# Patient Record
Sex: MALE | Race: WHITE | NOT HISPANIC OR LATINO | Employment: OTHER | URBAN - METROPOLITAN AREA
[De-identification: names, ages, dates, MRNs, and addresses within clinical notes are randomized per-mention and may not be internally consistent; named-entity substitution may affect disease eponyms.]

---

## 2017-03-15 RX ORDER — SPIRONOLACTONE 25 MG/1
TABLET ORAL
Qty: 90 TABLET | Refills: 11 | Status: SHIPPED | OUTPATIENT
Start: 2017-03-15 | End: 2017-05-29

## 2017-04-12 RX ORDER — OMEPRAZOLE 40 MG/1
40 CAPSULE, DELAYED RELEASE ORAL DAILY
Qty: 30 CAPSULE | Refills: 11 | Status: SHIPPED | OUTPATIENT
Start: 2017-04-12 | End: 2017-05-29 | Stop reason: SDUPTHER

## 2017-05-18 DIAGNOSIS — N39.41 URGENCY INCONTINENCE: ICD-10-CM

## 2017-05-18 DIAGNOSIS — N32.81 OAB (OVERACTIVE BLADDER): ICD-10-CM

## 2017-05-18 RX ORDER — OXYBUTYNIN CHLORIDE 10 MG/1
TABLET, EXTENDED RELEASE ORAL
Qty: 90 TABLET | Refills: 3 | Status: SHIPPED | OUTPATIENT
Start: 2017-05-18 | End: 2017-05-29

## 2017-05-20 ENCOUNTER — HOSPITAL ENCOUNTER (INPATIENT)
Facility: HOSPITAL | Age: 77
LOS: 1 days | Discharge: HOME HEALTH | DRG: 066 | End: 2017-05-22
Attending: EMERGENCY MEDICINE | Admitting: NURSE PRACTITIONER
Payer: MEDICARE

## 2017-05-20 DIAGNOSIS — I48.0 PAF (PAROXYSMAL ATRIAL FIBRILLATION): ICD-10-CM

## 2017-05-20 DIAGNOSIS — I35.9 NONRHEUMATIC AORTIC VALVE DISORDER: ICD-10-CM

## 2017-05-20 DIAGNOSIS — R47.1 DYSARTHRIA: ICD-10-CM

## 2017-05-20 DIAGNOSIS — E03.4 HYPOTHYROIDISM DUE TO ACQUIRED ATROPHY OF THYROID: ICD-10-CM

## 2017-05-20 DIAGNOSIS — R13.10 DYSPHAGIA, UNSPECIFIED TYPE: ICD-10-CM

## 2017-05-20 DIAGNOSIS — R13.10 DYSPHAGIA: ICD-10-CM

## 2017-05-20 DIAGNOSIS — R29.810 FACIAL DROOP: ICD-10-CM

## 2017-05-20 DIAGNOSIS — G45.9 TRANSIENT CEREBRAL ISCHEMIA, UNSPECIFIED TYPE: Primary | ICD-10-CM

## 2017-05-20 DIAGNOSIS — I63.449 CEREBRAL INFARCTION DUE TO EMBOLISM OF UNSPECIFIED CEREBELLAR ARTERY: ICD-10-CM

## 2017-05-20 LAB
ALBUMIN SERPL BCP-MCNC: 4.1 G/DL
ALP SERPL-CCNC: 37 U/L
ALT SERPL W/O P-5'-P-CCNC: 17 U/L
ANION GAP SERPL CALC-SCNC: 11 MMOL/L
AST SERPL-CCNC: 20 U/L
BASOPHILS # BLD AUTO: 0.02 K/UL
BASOPHILS NFR BLD: 0.1 %
BILIRUB SERPL-MCNC: 0.8 MG/DL
BUN SERPL-MCNC: 21 MG/DL
CALCIUM SERPL-MCNC: 10.2 MG/DL
CHLORIDE SERPL-SCNC: 104 MMOL/L
CHOLEST/HDLC SERPL: 2.9 {RATIO}
CO2 SERPL-SCNC: 24 MMOL/L
CREAT SERPL-MCNC: 0.8 MG/DL
DIFFERENTIAL METHOD: ABNORMAL
EOSINOPHIL # BLD AUTO: 0.1 K/UL
EOSINOPHIL NFR BLD: 0.4 %
ERYTHROCYTE [DISTWIDTH] IN BLOOD BY AUTOMATED COUNT: 13.9 %
EST. GFR  (AFRICAN AMERICAN): >60 ML/MIN/1.73 M^2
EST. GFR  (NON AFRICAN AMERICAN): >60 ML/MIN/1.73 M^2
GLUCOSE SERPL-MCNC: 105 MG/DL
HCT VFR BLD AUTO: 43.5 %
HDL/CHOLESTEROL RATIO: 33.9 %
HDLC SERPL-MCNC: 165 MG/DL
HDLC SERPL-MCNC: 56 MG/DL
HGB BLD-MCNC: 14.5 G/DL
INR PPP: 1
LDLC SERPL CALC-MCNC: 99.6 MG/DL
LYMPHOCYTES # BLD AUTO: 1.9 K/UL
LYMPHOCYTES NFR BLD: 12.2 %
MCH RBC QN AUTO: 31.5 PG
MCHC RBC AUTO-ENTMCNC: 33.3 %
MCV RBC AUTO: 94 FL
MONOCYTES # BLD AUTO: 1 K/UL
MONOCYTES NFR BLD: 6.2 %
NEUTROPHILS # BLD AUTO: 12.6 K/UL
NEUTROPHILS NFR BLD: 80.8 %
NONHDLC SERPL-MCNC: 109 MG/DL
PLATELET # BLD AUTO: 215 K/UL
PMV BLD AUTO: 11.4 FL
POTASSIUM SERPL-SCNC: 4.1 MMOL/L
PROT SERPL-MCNC: 7.1 G/DL
PROTHROMBIN TIME: 10.4 SEC
RBC # BLD AUTO: 4.61 M/UL
SODIUM SERPL-SCNC: 139 MMOL/L
TRIGL SERPL-MCNC: 47 MG/DL
TSH SERPL DL<=0.005 MIU/L-ACNC: 1.59 UIU/ML
WBC # BLD AUTO: 15.6 K/UL

## 2017-05-20 PROCEDURE — 82962 GLUCOSE BLOOD TEST: CPT

## 2017-05-20 PROCEDURE — 96360 HYDRATION IV INFUSION INIT: CPT

## 2017-05-20 PROCEDURE — 80061 LIPID PANEL: CPT

## 2017-05-20 PROCEDURE — 96361 HYDRATE IV INFUSION ADD-ON: CPT

## 2017-05-20 PROCEDURE — 25000003 PHARM REV CODE 250: Performed by: NURSE PRACTITIONER

## 2017-05-20 PROCEDURE — 80053 COMPREHEN METABOLIC PANEL: CPT

## 2017-05-20 PROCEDURE — 85025 COMPLETE CBC W/AUTO DIFF WBC: CPT

## 2017-05-20 PROCEDURE — 93010 ELECTROCARDIOGRAM REPORT: CPT | Mod: ,,, | Performed by: INTERNAL MEDICINE

## 2017-05-20 PROCEDURE — 99285 EMERGENCY DEPT VISIT HI MDM: CPT | Mod: ,,, | Performed by: EMERGENCY MEDICINE

## 2017-05-20 PROCEDURE — G0378 HOSPITAL OBSERVATION PER HR: HCPCS

## 2017-05-20 PROCEDURE — 99205 OFFICE O/P NEW HI 60 MIN: CPT | Mod: ,,, | Performed by: NURSE PRACTITIONER

## 2017-05-20 PROCEDURE — 84443 ASSAY THYROID STIM HORMONE: CPT

## 2017-05-20 PROCEDURE — 99285 EMERGENCY DEPT VISIT HI MDM: CPT | Mod: 25

## 2017-05-20 PROCEDURE — 93005 ELECTROCARDIOGRAM TRACING: CPT

## 2017-05-20 PROCEDURE — 85610 PROTHROMBIN TIME: CPT

## 2017-05-20 RX ORDER — HEPARIN SODIUM 5000 [USP'U]/ML
5000 INJECTION, SOLUTION INTRAVENOUS; SUBCUTANEOUS EVERY 8 HOURS
Status: DISCONTINUED | OUTPATIENT
Start: 2017-05-21 | End: 2017-05-22 | Stop reason: HOSPADM

## 2017-05-20 RX ORDER — ASPIRIN 81 MG/1
81 TABLET ORAL DAILY
Status: DISCONTINUED | OUTPATIENT
Start: 2017-05-21 | End: 2017-05-22

## 2017-05-20 RX ORDER — SODIUM CHLORIDE 9 MG/ML
1000 INJECTION, SOLUTION INTRAVENOUS CONTINUOUS
Status: ACTIVE | OUTPATIENT
Start: 2017-05-20 | End: 2017-05-21

## 2017-05-20 RX ORDER — ONDANSETRON 2 MG/ML
4 INJECTION INTRAMUSCULAR; INTRAVENOUS EVERY 12 HOURS PRN
Status: DISCONTINUED | OUTPATIENT
Start: 2017-05-20 | End: 2017-05-22 | Stop reason: HOSPADM

## 2017-05-20 RX ORDER — LEVOTHYROXINE SODIUM 75 UG/1
75 TABLET ORAL DAILY
Status: DISCONTINUED | OUTPATIENT
Start: 2017-05-21 | End: 2017-05-22 | Stop reason: HOSPADM

## 2017-05-20 RX ORDER — MIRTAZAPINE 15 MG/1
30 TABLET, FILM COATED ORAL NIGHTLY
Status: DISCONTINUED | OUTPATIENT
Start: 2017-05-21 | End: 2017-05-22 | Stop reason: HOSPADM

## 2017-05-20 RX ORDER — MONTELUKAST SODIUM 10 MG/1
10 TABLET ORAL DAILY
Status: DISCONTINUED | OUTPATIENT
Start: 2017-05-21 | End: 2017-05-22 | Stop reason: HOSPADM

## 2017-05-20 RX ORDER — SODIUM CHLORIDE 0.9 % (FLUSH) 0.9 %
3 SYRINGE (ML) INJECTION EVERY 8 HOURS
Status: DISCONTINUED | OUTPATIENT
Start: 2017-05-20 | End: 2017-05-22 | Stop reason: HOSPADM

## 2017-05-20 RX ORDER — ATORVASTATIN CALCIUM 20 MG/1
20 TABLET, FILM COATED ORAL DAILY
Status: DISCONTINUED | OUTPATIENT
Start: 2017-05-21 | End: 2017-05-22 | Stop reason: HOSPADM

## 2017-05-20 RX ORDER — DOXAZOSIN 8 MG/1
8 TABLET ORAL NIGHTLY
Status: DISCONTINUED | OUTPATIENT
Start: 2017-05-21 | End: 2017-05-22 | Stop reason: HOSPADM

## 2017-05-20 RX ADMIN — SODIUM CHLORIDE 1000 ML: 0.9 INJECTION, SOLUTION INTRAVENOUS at 10:05

## 2017-05-21 PROBLEM — I63.449: Status: ACTIVE | Noted: 2017-05-20

## 2017-05-21 PROBLEM — R29.810 FACIAL DROOP: Status: RESOLVED | Noted: 2017-05-20 | Resolved: 2017-05-21

## 2017-05-21 PROBLEM — R47.1 DYSARTHRIA: Status: RESOLVED | Noted: 2017-05-20 | Resolved: 2017-05-21

## 2017-05-21 LAB
AORTIC VALVE REGURGITATION: NORMAL
DIASTOLIC DYSFUNCTION: NO
POCT GLUCOSE: 108 MG/DL (ref 70–110)
POCT GLUCOSE: 110 MG/DL (ref 70–110)
RETIRED EF AND QEF - SEE NOTES: 63 (ref 55–65)
TRICUSPID VALVE REGURGITATION: NORMAL

## 2017-05-21 PROCEDURE — G9158 MOTOR SPEECH D/C STATUS: HCPCS | Mod: CI

## 2017-05-21 PROCEDURE — 25000003 PHARM REV CODE 250: Performed by: NURSE PRACTITIONER

## 2017-05-21 PROCEDURE — G8988 SELF CARE GOAL STATUS: HCPCS | Mod: CJ

## 2017-05-21 PROCEDURE — G8987 SELF CARE CURRENT STATUS: HCPCS | Mod: CK

## 2017-05-21 PROCEDURE — 97165 OT EVAL LOW COMPLEX 30 MIN: CPT

## 2017-05-21 PROCEDURE — G9186 MOTOR SPEECH GOAL STATUS: HCPCS | Mod: CH

## 2017-05-21 PROCEDURE — 25500020 PHARM REV CODE 255: Performed by: PSYCHIATRY & NEUROLOGY

## 2017-05-21 PROCEDURE — 93306 TTE W/DOPPLER COMPLETE: CPT | Mod: 26,,, | Performed by: INTERNAL MEDICINE

## 2017-05-21 PROCEDURE — 93306 TTE W/DOPPLER COMPLETE: CPT

## 2017-05-21 PROCEDURE — G8999 MOTOR SPEECH CURRENT STATUS: HCPCS | Mod: CJ

## 2017-05-21 PROCEDURE — A9585 GADOBUTROL INJECTION: HCPCS | Performed by: PSYCHIATRY & NEUROLOGY

## 2017-05-21 PROCEDURE — 20600001 HC STEP DOWN PRIVATE ROOM

## 2017-05-21 PROCEDURE — 97162 PT EVAL MOD COMPLEX 30 MIN: CPT

## 2017-05-21 PROCEDURE — 99233 SBSQ HOSP IP/OBS HIGH 50: CPT | Mod: GC,,, | Performed by: PSYCHIATRY & NEUROLOGY

## 2017-05-21 PROCEDURE — 92523 SPEECH SOUND LANG COMPREHEN: CPT

## 2017-05-21 RX ORDER — GADOBUTROL 604.72 MG/ML
10 INJECTION INTRAVENOUS
Status: COMPLETED | OUTPATIENT
Start: 2017-05-21 | End: 2017-05-21

## 2017-05-21 RX ADMIN — HEPARIN SODIUM 5000 UNITS: 5000 INJECTION, SOLUTION INTRAVENOUS; SUBCUTANEOUS at 09:05

## 2017-05-21 RX ADMIN — GADOBUTROL 10 ML: 604.72 INJECTION INTRAVENOUS at 10:05

## 2017-05-21 RX ADMIN — SODIUM CHLORIDE, PRESERVATIVE FREE 3 ML: 5 INJECTION INTRAVENOUS at 06:05

## 2017-05-21 RX ADMIN — HEPARIN SODIUM 5000 UNITS: 5000 INJECTION, SOLUTION INTRAVENOUS; SUBCUTANEOUS at 06:05

## 2017-05-21 RX ADMIN — HEPARIN SODIUM 5000 UNITS: 5000 INJECTION, SOLUTION INTRAVENOUS; SUBCUTANEOUS at 05:05

## 2017-05-21 NOTE — ASSESSMENT & PLAN NOTE
Will be evaluated for complete stroke workup  including aspirin 81 mg daily  Lipitor 20 mg daily and will check lipid profile  Therapy: PT/OT/ST  Diagnostics: MRI/MRA Brain and neck, labs, 2 d echo  Nursing: Swallow eval, neuro checks, SCD's, vitals, stroke education  VTE: SCD's and heparin 5000 units sc Q8H  IV fluids, NS 75 ml/hr

## 2017-05-21 NOTE — PROGRESS NOTES
Pt arrived to MRI on continuous tele, HR 63, pt on RA, pt alert, tele room notified that pt will be off of tele monitor & on MRI monitor

## 2017-05-21 NOTE — HPI
78 y/o male who has had episodes of dizziness only when he stands off and on for 2 days. Today around 1100 he states that he had difficulty swallowing and has not swallowed anything since. Also left facial droop present and dysarthria, no sensory deficit or extremity weakness still states dizziness is only upon standing and not with movement of head .   He has afib but not on any anticoagulation. He sees's Dr Herrmann and no documentation as to why no anticoagulation.   Risk factors are afib, TIA, hypothroid

## 2017-05-21 NOTE — ASSESSMENT & PLAN NOTE
-[id number] 4  -Not on anticoagulation, risk factor  -Used to be on eliquis, stopped b/c had some confusion on this and multaq  -Consider restarting eliquis, discussed with family and wife

## 2017-05-21 NOTE — NURSING
POC reviewed and Stroke ED handout discussed. Pt resting with IV fluids instilling, telemetry monitoring in process. Facial droop, dysarthria and generalized weakness assessed. Able to sit at edge of bed with standby assist. Pt with audible voice change and unwilling to swallow; will defer swallow eval to ST in am. VSS. Fall prevention plan discussed and call light in reach.

## 2017-05-21 NOTE — PROVIDER PROGRESS NOTES - EMERGENCY DEPT.
Encounter Date: 5/20/2017    ED Physician Progress Notes       SCRIBE NOTE: I, Maryanne Rivera, am scribing for, and in the presence of,  Dr. Elam.  Physician Statement: I, Dr. Elam, personally performed the services described in this documentation as scribed by Maryanne Rivera in my presence, and it is both accurate and complete.     Physician Note:   77-year-old man who 2 days ago had episode of unsteadiness when he walked and feeling dizzy that resolved, then today at around 11 AM, he began to have difficulty swallowing and difficulty speaking. Sometime during the day today, the unsteadiness and dizziness returned. Here he has a left facial droop. He has history of paroxysmal A-fib, but has been intolerant of blood thinners and reports he has not had A-fib for some time now.    On exam, there is a left facial droop. Full strength, bilateral upper extremities and bilateral lower extremities.     I immediately called a Stroke Code after my evaluation and contacted Dr. Christy, vascular neurology staff, who will have his team evaluate the patient and likely admit.     I initially evaluated this patient and ordered workup while in intake.  The patient will receive a full evaluation in an ED pod when space is available.  All results from tests ordered in intake will not be followed by the intake team, including myself. All results will be followed by the ED Pod team.

## 2017-05-21 NOTE — PROGRESS NOTES
Attempted to place NGT. Pt unable to tolerate placement. Requests that procedure stop due to gagging. Removed NGT. Called stroke team. Orders noted to leave NGT out.

## 2017-05-21 NOTE — ED NOTES
Patient identifiers verified and correct for Taran Crowell.    LOC: The patient is awake, alert and aware of environment with an appropriate affect, the patient is oriented x 3 and speaking appropriately with slight slur  APPEARANCE: Patient resting comfortably and in no acute distress, patient is clean and well groomed, patient's clothing is properly fastened.  SKIN: The skin is warm and dry, color consistent with ethnicity, patient has normal skin turgor and moist mucus membranes, skin intact, no breakdown or bruising noted.  MUSCULOSKELETAL: Patient moving all extremities spontaneously, no obvious swelling or deformities noted.  RESPIRATORY: Airway is open and patent, respirations are spontaneous, patient has a normal effort and rate, no accessory muscle use noted, bilateral breath sounds even and clear.  CARDIAC: Patient has a normal rate and regular rhythm, no periphreal edema noted, capillary refill < 3 seconds.  ABDOMEN: Soft and non tender to palpation, no distention noted, normoactive bowel sounds present in all four quadrants.  NEUROLOGIC: Pupils equal bilaterally, eyes open spontaneously, behavior appropriate to situation, follows commands, facial expression with left facial droop, bilateral hand grasp equal and even, purposeful motor response noted, normal sensation in all extremities when touched with a finger. Pt has left leg drift, pt has slight slurring and mild headache

## 2017-05-21 NOTE — PLAN OF CARE
Problem: Occupational Therapy Goal  Goal: Occupational Therapy Goal  Goals to be met by: 5/28/17     Patient will increase functional independence with ADLs by performing:    UE Dressing with Modified [place].  LE Dressing with Modified [place].  Grooming while standing with Supervision.  Toileting from toilet with Supervision for hygiene and clothing management.   Rolling to Bilateral with Modified [place].   Supine to sit with Modified [place].  Stand pivot transfers with Supervision.    Outcome: Ongoing (interventions implemented as appropriate)  OT eval completed.

## 2017-05-21 NOTE — PROGRESS NOTES
[facility]  Vascular Neurology  Comprehensive Stroke Center  Progress Note      Assessment/Plan:     76 y/o male with dysphagia, facial droop and dysarthria. HX of afib and not on anticoagulation. W/U in progress as well as medical w/u for other causes    * Dysphagia    Antithrombotics: aspirin 81 mg daily  Statins: Lipitor 20 mg daily   Therapy: PT/OT/ST  Diagnostics: MRI/MRA Brain and neck, labs, 2 d echo  Nursing: Swallow eval, neuro checks, SCD's, vitals, stroke education  VTE: SCD's and heparin 5000 units sc Q8H  IV fluids, NS 75 ml/hr        Dysarthria    See above        Facial droop    See above        Hypothyroidism due to acquired atrophy of thyroid    -TSH nl  Continue home thyroid replacement        PAF (paroxysmal atrial fibrillation)    -[id number] 4  -Not on anticoagulation, risk factor  -Used to be on eliquis, stopped b/c had some confusion on this and multaq  -Consider restarting eliquis, discussed with family and wife              Neurologic Chief Complaint: dysphagia    Subjective:     Interval History: Patient is seen for follow-up neurological assessment and treatment recommendations: Reports improvement in dysarthria and weakness but continued dysphagia. Per wife some unresponsiveness during the event.     Review of patient's allergies indicates:  No Known Allergies    Medications: I have reviewed the current medication administration record.    Prescriptions Prior to Admission   Medication Sig Dispense Refill Last Dose    atorvastatin (LIPITOR) 20 MG tablet Take 1 tablet (20 mg total) by mouth once daily. 90 tablet 3 5/19/2017    co-enzyme Q-10 50 mg capsule Take 50 mg by mouth once daily.   Past Week    doxazosin (CARDURA) 8 MG Tab TAKE 1 TABLET BY MOUTH EVERY EVENING 90 tablet 11 5/20/2017    mirtazapine (REMERON) 30 MG tablet Take 1 tablet (30 mg total) by mouth nightly. 30 tablet 5 5/19/2017    montelukast (SINGULAIR) 10 mg tablet Take 10 mg by mouth once daily.  5  Past Month    omeprazole (PRILOSEC) 40 MG capsule Take 1 capsule (40 mg total) by mouth once daily. 30 capsule 11 5/20/2017    oxybutynin (DITROPAN-XL) 10 MG 24 hr tablet TAKE 1 TABLET EVERY DAY 90 tablet 3 5/19/2017    spironolactone (ALDACTONE) 25 MG tablet TAKE 1 TABLET EVERY DAY 90 tablet 11 Past Week    SYNTHROID 75 mcg tablet Take 1 tablet (75 mcg total) by mouth once daily. 30 tablet 11 Taking       Review of Systems   Constitutional: Negative for chills and fever.   HENT: Negative for drooling and ear discharge.    Eyes: Negative for pain and redness.   Respiratory: Negative for cough and shortness of breath.    Cardiovascular: Negative for chest pain.   Gastrointestinal: Negative for abdominal pain and constipation.        Dysphagia   Endocrine: Negative for heat intolerance and polydipsia.   Genitourinary: Negative for difficulty urinating and dysuria.   Musculoskeletal: Negative for arthralgias and back pain.   Skin: Negative for rash and wound.   Neurological: Negative for tremors, facial asymmetry and speech difficulty.   Psychiatric/Behavioral: Negative for agitation and confusion.     Objective:     Vital Signs (Most Recent):  Temp: 98 °F (36.7 °C) (05/21/17 0400)  Pulse: 63 (MRI) (05/21/17 0949)  Resp: 16 (05/21/17 0800)  BP: (!) 142/68 (05/21/17 0800)  SpO2: 98 % (05/21/17 0800)    Vital Signs Range (Last 24H):  Temp:  [98 °F (36.7 °C)-98.4 °F (36.9 °C)]   Pulse:  [51-98]   Resp:  [16-21]   BP: (135-157)/(68-87)   SpO2:  [95 %-98 %]     Physical Exam   Constitutional: He is oriented to person, place, and time. He appears well-developed and well-nourished.   HENT:   Head: Normocephalic and atraumatic.   Eyes: Pupils are equal, round, and reactive to light.   Neck: Normal range of motion.   Cardiovascular: Normal rate.    Pulmonary/Chest: Effort normal.   Abdominal: Soft.   Musculoskeletal: Normal range of motion.   Neurological: He is alert and oriented to person, place, and time. GCS eye  subscore is 4 - spontaneous. GCS verbal subscore is 5 - oriented. GCS motor subscore is 6 - obeys commands.   Facial droop, dysarthriA   Skin: Skin is warm and dry.   Psychiatric: He has a normal mood and affect.   Nursing note and vitals reviewed.      Neurological Exam:   LOC: alert and follows requests  Language: No aphasia  Speech: No dysarthria  Orientation: Person, Place, Time  Visual Fields (CN II): Full  EOM (CN III, IV, VI): Full/intact  Oculocephalics: normal  Pupils (CN III, IV, VI): PERRL  Facial Movement (CN VII): lower weakness left lower  Hearing (CN VIII): intact bilaterally  Gag Reflex (CN IX, X): normal/symmetric  Shoulder/Neck (CN XI): SCM-Left: Normal ; SCM-Right: Normal ; Shoulder Shrug: Normal/Symetric  Tongue (CN XII): to midline  Motor*: Arm Left:  Normal (5/5), Leg Left:   Normal (5/5), Arm Right:   Normal (5/5), Leg Right:   Normal (5/5)  Tone: Arm-Left: normal; Leg-Left: normal; Arm-Right: normal; Leg-Right: normal    NIH Stroke Scale:  Interval: baseline (upon arrival/admit)  Level of Consciousness: 0 - alert  LOC Questions: 0 - answers both correctly  LOC Commands: 0 - performs both correctly  Best Gaze: 0 - normal  Visual: 0 - no visual loss  Facial Palsy: 1 - minor  Motor Left Arm: 0 - no drift  Motor Right Arm: 0 - no drift  Motor Left Le - no drift  Motor Right Le - no drift  Limb Ataxia: 0 - absent  Sensory: 0 - normal  Best Language: 0 - no aphasia  Dysarthria: 1 - mild to moderate dysarthria  Extinction and Inattention: 0 - no neglect  NIH Stroke Scale Total: 2  Justyna Coma Scale:  Best Eye Response: 4 - spontaneous  Best Motor Response: 6 - obeys commands  Best Verbal Response: 5 - oriented  Seaman Coma Scale Total: 15  Modified Hinton Scale:   Timeline: Prior to symptoms onset  Modified Joey Score: 0 - no symptoms    [id number] Scale:   Age: 2 - 75 years old or older  CHF History: 0 - no  HTN History: 0 - no  Stroke/TIA/Thromboembolism History: 2 - yes  Vascular  Disease History: 0 - no  Diabetes Mellitus in History: 0 - no  Female: 0 - no  [id number] Scale Total: 4      Laboratory:  CMP:     Recent Labs  Lab 05/20/17 2115   CALCIUM 10.2   ALBUMIN 4.1   PROT 7.1      K 4.1   CO2 24      BUN 21   CREATININE 0.8   ALKPHOS 37*   ALT 17   AST 20   BILITOT 0.8     CBC:     Recent Labs  Lab 05/20/17 2115   WBC 15.60*   RBC 4.61   HGB 14.5   HCT 43.5      MCV 94   MCH 31.5*   MCHC 33.3     Lipid Panel:     Recent Labs  Lab 05/20/17 2115   CHOL 165   LDLCALC 99.6   HDL 56   TRIG 47     Coagulation:     Recent Labs  Lab 05/20/17 2115   INR 1.0     Platelet Aggregation Study: No results for input(s): PLTAGG, PLTAGINTERP, PLTAGREGLACO, ADPPLTAGGREG in the last 168 hours.  Hgb A1C: No results for input(s): HGBA1C in the last 168 hours.  TSH:     Recent Labs  Lab 05/20/17 2115   TSH 1.591       Diagnostic Results:  Brain Imaging:   CT head w/o contrast 5-20-17 results:  Focal hypodensity in the right basal ganglia, in a region of artifact, likely reflects sequela of the same, no evidence of large vascular territory infarct.    Cardiac Evaluation:   EKG/Telemetry: 5-20-17 unconfirmed results:  Sinus bradycardia with 1st degree A-V block  Left axis deviation  Anterior infarct (cited on or before 20-MAY-2017)      Ino Garcia IV, MD  [facility]  Department of Vascular Neurology   [facility]

## 2017-05-21 NOTE — ED PROVIDER NOTES
Encounter Date: 5/20/2017    SCRIBE #1 NOTE: I, Renae Michelle, am scribing for, and in the presence of, Dr. Moffett.       History     Chief Complaint   Patient presents with    Dizziness     Dizziness all day today. Denies any SOB or CP.      Review of patient's allergies indicates:  No Known Allergies    The patient is a 77 y.o. male with hx of A-Fib that presents to the ED complaining of dizziness and unsteadiness onset 11 AM.  Wife reports finding the patient in the study at the nursing facility with his arms back and choking on water. Patient spit out the water because he had trouble swallowing. Per patient's wife, the nurse at the facility took the patient's BP and it was 120/80 when sitting down and 100/80 when standing. The patient's daughter arrived at the facility and she and the nurse assisted the patient with his ambulation. He was unable to ambulate or get in the car by himself due to weakness and lack of coordination. Facial droop was noted as the patient waited in the ED. He denies any HA. Per nurses's note, pt denies any SOB or CP. Wife reports 2 days ago, patient had dizziness for about 20 minutes, he was unsteady and sitting down alleviated his symptoms.        The history is provided by the patient, medical records and the spouse.     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 No mass effect or hemorrhage. Additional remote lacunar type infarcts noted in this same region. Decreased signal in the distal right vertebral artery, suggesting hypoplasia or stenosis. The vertebral basilar system is left-sided dominant.    Chronic anticoagulation - on apixaban 5/29/2017    Essential tremor 5/29/2017    On Remeron    GERD (gastroesophageal  reflux disease)     History of colon polyps 3/18/2014    Hypothyroidism due to acquired atrophy of thyroid 2015    Last on .  None since then.    Memory loss last months.    Mixed hyperlipidemia 2017    PAF (paroxysmal atrial fibrillation) 2015     Past Surgical History:   Procedure Laterality Date    [place]ONOSCOPY  2000  ([place])    Internal hemorrhoids, otherwise normal exam.    COLONOSCOPY W/ POLYPECTOMY  2010  [facility]    One less than 1 mm polyp in the distal sigmoid.  TUBULAR ADENOMA.    One less than 1 mm polyp in the cecum.  TUBULAR ADENOMA.    Non-bleeding internal hemorrhoids.       UPPER GASTROINTESTINAL ENDOSCOPY  2007  (Dr. Bourgeois)    Grade 1 reflux esophagitis.  Small/medium hiatal hernia.  Pylorus erythema.    UPPER GASTROINTESTINAL ENDOSCOPY  2010  [facility]    Slight reflux esophagitis.  Z-line regular, 30 cm from the incisors.   Moderate / large hiatus hernia.  Normal stomach and duodenum.  SELAM Test  Negative.      Family History   Problem Relation Age of Onset    Cancer Father      sarcoma,  of     Social History   Substance Use Topics    Smoking status: Never Smoker    Smokeless tobacco: Former User    Alcohol use No     Review of Systems   Constitutional: Negative for fever.   HENT: Positive for trouble swallowing.         (+) Facial droop   Respiratory: Negative for shortness of breath.    Cardiovascular: Negative for chest pain.   Musculoskeletal: Positive for gait problem.   Skin: Negative for rash.   Neurological: Positive for dizziness and weakness. Negative for headaches.        (+) Lack of coordination   Hematological: Does not bruise/bleed easily.       Physical Exam   Initial Vitals   BP Pulse Resp Temp SpO2   17   152/71 84 18 98.4 °F (36.9 °C) 98 %     Physical Exam    Constitutional: He is cooperative. He appears ill.   HENT:   Head: Normocephalic and atraumatic.    Mouth/Throat: Oropharynx is clear and moist.   Eyes: Conjunctivae and lids are normal.   Neck: Normal range of motion. Neck supple. No JVD present.   Cardiovascular: Normal rate and normal heart sounds. An irregular rhythm present.   Pulmonary/Chest: Breath sounds normal. No accessory muscle usage. No tachypnea. No respiratory distress.   Abdominal: Bowel sounds are normal. There is no tenderness.   Musculoskeletal: Normal range of motion.   Neurological: He is alert. A cranial nerve deficit is present. Coordination abnormal.   Skin: Skin is warm and dry.         ED Course   Procedures  Labs Reviewed   CBC W/ AUTO DIFFERENTIAL - Abnormal; Notable for the following:        Result Value    WBC 15.60 (*)     MCH 31.5 (*)     Gran # 12.6 (*)     Gran% 80.8 (*)     Lymph% 12.2 (*)     All other components within normal limits   COMPREHENSIVE METABOLIC PANEL - Abnormal; Notable for the following:     Alkaline Phosphatase 37 (*)     All other components within normal limits   PROTIME-INR   TSH   LIPID PANEL             Medical Decision Making:   History:   Old Medical Records: I decided to obtain old medical records.  Initial Assessment:   Patient presenting to the ED because of onset of dizziness discoordination difficulty swallowing this occurred several hours ago  Differential Diagnosis:   CVA TIA of benign positional vertigo  Clinical Tests:   Lab Tests: Ordered and Reviewed  Radiological Study: Ordered and Reviewed  Medical Tests: Ordered and Reviewed  ED Management:  Will do labs according to the stroke protocol we'll consult vascular neurology will keep the patient stabilized we'll do imaging            Scribe Attestation:   Scribe #1: I performed the above scribed service and the documentation accurately describes the services I performed. I attest to the accuracy of the note.    Attending Attestation:           Physician Attestation for Scribe:  Physician Attestation Statement for Scribe #1: I, Dr. Moffett,  reviewed documentation, as scribed by Renae Michelle in my presence, and it is both accurate and complete.         Attending ED Notes:   Patient evaluated in the ED because of onset of dizziness discoordination also difficulty swallowing, patient evaluated for probable stroke stroke protocol was performed the neurovascular team evaluated the patient and the patient will be admitted to the hospital for further evaluation          ED Course     Clinical Impression:   The primary encounter diagnosis was Transient cerebral ischemia, unspecified type. 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    Disposition:   Disposition: Placed in Observation  Condition: Serious       Milan Granados MD  06/19/17 0365

## 2017-05-21 NOTE — SUBJECTIVE & OBJECTIVE
Past Medical History:   Diagnosis Date    Arthritis     Atrial fibrillation     BPH (benign prostatic hyperplasia)     Change in mental status 3-4 weeks ago.    GERD (gastroesophageal reflux disease)     Hypothyroid     Memory loss last months.     Past Surgical History:   Procedure Laterality Date    COLONOSCOPY  2000  ([place])    Internal hemorrhoids, otherwise normal exam.    COLONOSCOPY W/ POLYPECTOMY  2010  [place]    One less than 1 mm polyp in the distal sigmoid.  TUBULAR ADENOMA.    One less than 1 mm polyp in the cecum.  TUBULAR ADENOMA.    Non-bleeding internal hemorrhoids.       UPPER GASTROINTESTINAL ENDOSCOPY  2007  (Dr. Bourgeois)    Grade 1 reflux esophagitis.  Small/medium hiatal hernia.  Pylorus erythema.    UPPER GASTROINTESTINAL ENDOSCOPY  2010  [place]    Slight reflux esophagitis.  Z-line regular, 30 cm from the incisors.   Moderate / large hiatus hernia.  Normal stomach and duodenum.  SELAM Test  Negative.      Family History   Problem Relation Age of Onset    Cancer Father      sarcoma,  of     Social History   Substance Use Topics    Smoking status: Never Smoker    Smokeless tobacco: Former User    Alcohol use No     Review of patient's allergies indicates:  No Known Allergies  Medications: I have reviewed the current medication administration record.      (Not in a hospital admission)    Review of Systems   Constitutional: Positive for fatigue. Negative for chills and fever.   HENT: Negative for drooling and ear discharge.    Eyes: Negative for pain and redness.   Respiratory: Negative for cough and shortness of breath.    Cardiovascular: Negative for chest pain.   Gastrointestinal: Negative for abdominal pain and constipation.        Dysphagia   Endocrine: Negative for heat intolerance and polydipsia.   Genitourinary: Negative for difficulty urinating and dysuria.   Musculoskeletal: Negative for arthralgias and back pain.   Skin: Negative for rash and  wound.   Neurological: Positive for dizziness, tremors, facial asymmetry and speech difficulty.   Psychiatric/Behavioral: Negative for agitation and confusion.     Objective:     Vital Signs (Most Recent):  Temp: 98.4 °F (36.9 °C) (05/20/17 2021)  Pulse: (!) 53 (05/20/17 2305)  Resp: 20 (05/20/17 2305)  BP: (!) 157/81 (05/20/17 2305)  SpO2: 97 % (05/20/17 2305)    Vital Signs Range (Last 24H):  Temp:  [98.4 °F (36.9 °C)]   Pulse:  [52-84]   Resp:  [18-21]   BP: (142-157)/(71-82)   SpO2:  [96 %-98 %]     Physical Exam   Constitutional: He is oriented to person, place, and time. He appears well-developed and well-nourished.   HENT:   Head: Normocephalic and atraumatic.   Eyes: Pupils are equal, round, and reactive to light.   Neck: Normal range of motion.   Cardiovascular: Normal rate.    Pulmonary/Chest: Effort normal.   Abdominal: Soft.   Musculoskeletal: Normal range of motion.   Neurological: He is alert and oriented to person, place, and time. GCS eye subscore is 4 - spontaneous. GCS verbal subscore is 5 - oriented. GCS motor subscore is 6 - obeys commands.   Facial droop, dysarthriA   Skin: Skin is warm and dry.   Psychiatric: He has a normal mood and affect.   Nursing note and vitals reviewed.      Neurological Exam:   LOC: alert and follows requests  Language: No aphasia  Speech: Dysarthria  Orientation: Person, Place, Time  Memory: Recent memory intact, Remote memory intact, Age correct, Month correct  Visual Fields (CN II): Full  EOM (CN III, IV, VI): Full/intact  Oculocephalics: normal  Pupils (CN III, IV, VI): PERRL  Facial Sensation (CN V): Symmetric, Corneal reflex intact  Facial Movement (CN VII): lower weakness left lower  Hearing (CN VIII): intact bilaterally  Gag Reflex (CN IX, X): normal/symmetric  Shoulder/Neck (CN XI): SCM-Left: Normal ; SCM-Right: Normal ; Shoulder Shrug: Normal/Symetric  Tongue (CN XII): to midline  Reflexes: flexor plantar responses bilaterally and 2+ throughout  Motor*: Arm  Left:  Normal (5/5), Leg Left:   Normal (5/5), Arm Right:   Normal (5/5), Leg Right:   Normal (5/5)  Cerebellar*: Not testable due to laying on stretcher  Sensation: intact to light touch, temperature and vibration  Tone: Arm-Left: normal; Leg-Left: normal; Arm-Right: normal; Leg-Right: normal    NIH Stroke Scale:  Interval: baseline (upon arrival/admit)  Level of Consciousness: 0 - alert  LOC Questions: 0 - answers both correctly  LOC Commands: 0 - performs both correctly  Best Gaze: 0 - normal  Visual: 0 - no visual loss  Facial Palsy: 1 - minor  Motor Left Arm: 0 - no drift  Motor Right Arm: 0 - no drift  Motor Left Le - no drift  Motor Right Le - no drift  Limb Ataxia: 0 - absent  Sensory: 0 - normal  Best Language: 0 - no aphasia  Dysarthria: 1 - mild to moderate dysarthria  Extinction and Inattention: 0 - no neglect  NIH Stroke Scale Total: 2  [place] Coma Scale:  Best Eye Response: 4 - spontaneous  Best Motor Response: 6 - obeys commands  Best Verbal Response: 5 - oriented  [place] Coma Scale Total: 15  Modified Joey Scale:   Timeline: Prior to symptoms onset  Modified [place] Score: 0 - no symptoms    [id number] Scale:   Age: 2 - 75 years old or older  CHF History: 0 - no  HTN History: 0 - no  Stroke/TIA/Thromboembolism History: 2 - yes  Vascular Disease History: 0 - no  Diabetes Mellitus in History: 0 - no  Female: 0 - no  [id number] Scale Total: 4      Laboratory:  CMP:   Recent Labs  Lab 17   CALCIUM 10.2   ALBUMIN 4.1   PROT 7.1      K 4.1   CO2 24      BUN 21   CREATININE 0.8   ALKPHOS 37*   ALT 17   AST 20   BILITOT 0.8     CBC:   Recent Labs  Lab 17   WBC 15.60*   RBC 4.61   HGB 14.5   HCT 43.5      MCV 94   MCH 31.5*   MCHC 33.3     Lipid Panel:   Recent Labs  Lab 17   CHOL 165   LDLCALC 99.6   HDL 56   TRIG 47     Coagulation:   Recent Labs  Lab 17   INR 1.0     Platelet Aggregation Study: No results for input(s):  PLTAGG, PLTAGINTERP, PLTAGREGLACO, ADPPLTAGGREG in the last 168 hours.  Hgb A1C: No results for input(s): HGBA1C in the last 168 hours.  TSH:   Recent Labs  Lab 05/20/17  2115   TSH 1.591       Diagnostic Results:  Brain Imaging:   CT head w/o contrast 5-20-17 results:  Focal hypodensity in the right basal ganglia, in a region of artifact, likely reflects sequela of the same, no evidence of large vascular territory infarct.    Cardiac Evaluation:   EKG/Telemetry: 5-20-17 unconfirmed results:  Sinus bradycardia with 1st degree A-V block  Left axis deviation  Anterior infarct (cited on or before 20-MAY-2017)

## 2017-05-21 NOTE — ED TRIAGE NOTES
"Pt's wife has had some dizziness on and off for several days, worsening X4-5 hours ago, now "throat is totally closed up, cannot swallow," may have pill stuck in his throat, is now "speaking with a heavy tongue," pt now has no balance, and cannot walk. Pt states he feels like he is speaking differently +L sided droop. symptoms started at 11am  "

## 2017-05-21 NOTE — H&P
[facility]  Vascular Neurology  Comprehensive Stroke Center  History & Physical    Subjective:     History of Present Illness:  78 y/o male who has had episodes of dizziness only when he stands off and on for 2 days. Today around 1100 he states that he had difficulty swallowing and has not swallowed anything since. Also left facial droop present and dysarthria, no sensory deficit or extremity weakness still states dizziness is only upon standing and not with movement of head .   He has afib but not on any anticoagulation. He sees's Dr Herrmann and no documentation as to why no anticoagulation.   Risk factors are afib, TIA, hypothroid           Past Medical History:   Diagnosis Date    Arthritis     Atrial fibrillation     BPH (benign prostatic hyperplasia)     Change in mental status 3-4 weeks ago.    GERD (gastroesophageal reflux disease)     Hypothyroid     Memory loss last months.     Past Surgical History:   Procedure Laterality Date    COLONOSCOPY  2000  ([place])    Internal hemorrhoids, otherwise normal exam.    COLONOSCOPY W/ POLYPECTOMY  2010  [place]    One less than 1 mm polyp in the distal sigmoid.  TUBULAR ADENOMA.    One less than 1 mm polyp in the cecum.  TUBULAR ADENOMA.    Non-bleeding internal hemorrhoids.       UPPER GASTROINTESTINAL ENDOSCOPY  2007  (Dr. Bourgeois)    Grade 1 reflux esophagitis.  Small/medium hiatal hernia.  Pylorus erythema.    UPPER GASTROINTESTINAL ENDOSCOPY  2010  [place]    Slight reflux esophagitis.  Z-line regular, 30 cm from the incisors.   Moderate / large hiatus hernia.  Normal stomach and duodenum.  SELAM Test  Negative.      Family History   Problem Relation Age of Onset    Cancer Father      sarcoma,  of     Social History   Substance Use Topics    Smoking status: Never Smoker    Smokeless tobacco: Former User    Alcohol use No     Review of patient's allergies indicates:  No Known Allergies  Medications: I have  reviewed the current medication administration record.      (Not in a hospital admission)    Review of Systems   Constitutional: Positive for fatigue. Negative for chills and fever.   HENT: Negative for drooling and ear discharge.    Eyes: Negative for pain and redness.   Respiratory: Negative for cough and shortness of breath.    Cardiovascular: Negative for chest pain.   Gastrointestinal: Negative for abdominal pain and constipation.        Dysphagia   Endocrine: Negative for heat intolerance and polydipsia.   Genitourinary: Negative for difficulty urinating and dysuria.   Musculoskeletal: Negative for arthralgias and back pain.   Skin: Negative for rash and wound.   Neurological: Positive for dizziness, tremors, facial asymmetry and speech difficulty.   Psychiatric/Behavioral: Negative for agitation and confusion.     Objective:     Vital Signs (Most Recent):  Temp: 98.4 °F (36.9 °C) (05/20/17 2021)  Pulse: (!) 53 (05/20/17 2305)  Resp: 20 (05/20/17 2305)  BP: (!) 157/81 (05/20/17 2305)  SpO2: 97 % (05/20/17 2305)    Vital Signs Range (Last 24H):  Temp:  [98.4 °F (36.9 °C)]   Pulse:  [52-84]   Resp:  [18-21]   BP: (142-157)/(71-82)   SpO2:  [96 %-98 %]     Physical Exam   Constitutional: He is oriented to person, place, and time. He appears well-developed and well-nourished.   HENT:   Head: Normocephalic and atraumatic.   Eyes: Pupils are equal, round, and reactive to light.   Neck: Normal range of motion.   Cardiovascular: Normal rate.    Pulmonary/Chest: Effort normal.   Abdominal: Soft.   Musculoskeletal: Normal range of motion.   Neurological: He is alert and oriented to person, place, and time. GCS eye subscore is 4 - spontaneous. GCS verbal subscore is 5 - oriented. GCS motor subscore is 6 - obeys commands.   Facial droop, dysarthriA   Skin: Skin is warm and dry.   Psychiatric: He has a normal mood and affect.   Nursing note and vitals reviewed.      Neurological Exam:   LOC: alert and follows  requests  Language: No aphasia  Speech: Dysarthria  Orientation: Person, Place, Time  Memory: Recent memory intact, Remote memory intact, Age correct, Month correct  Visual Fields (CN II): Full  EOM (CN III, IV, VI): Full/intact  Oculocephalics: normal  Pupils (CN III, IV, VI): PERRL  Facial Sensation (CN V): Symmetric, Corneal reflex intact  Facial Movement (CN VII): lower weakness left lower  Hearing (CN VIII): intact bilaterally  Gag Reflex (CN IX, X): normal/symmetric  Shoulder/Neck (CN XI): SCM-Left: Normal ; SCM-Right: Normal ; Shoulder Shrug: Normal/Symetric  Tongue (CN XII): to midline  Reflexes: flexor plantar responses bilaterally and 2+ throughout  Motor*: Arm Left:  Normal (5/5), Leg Left:   Normal (5/5), Arm Right:   Normal (5/5), Leg Right:   Normal (5/5)  Cerebellar*: Not testable due to laying on stretcher  Sensation: intact to light touch, temperature and vibration  Tone: Arm-Left: normal; Leg-Left: normal; Arm-Right: normal; Leg-Right: normal    NIH Stroke Scale:  Interval: baseline (upon arrival/admit)  Level of Consciousness: 0 - alert  LOC Questions: 0 - answers both correctly  LOC Commands: 0 - performs both correctly  Best Gaze: 0 - normal  Visual: 0 - no visual loss  Facial Palsy: 1 - minor  Motor Left Arm: 0 - no drift  Motor Right Arm: 0 - no drift  Motor Left Le - no drift  Motor Right Le - no drift  Limb Ataxia: 0 - absent  Sensory: 0 - normal  Best Language: 0 - no aphasia  Dysarthria: 1 - mild to moderate dysarthria  Extinction and Inattention: 0 - no neglect  NIH Stroke Scale Total: 2  [place] Coma Scale:  Best Eye Response: 4 - spontaneous  Best Motor Response: 6 - obeys commands  Best Verbal Response: 5 - oriented  [place] Coma Scale Total: 15  Modified [place] Scale:   Timeline: Prior to symptoms onset  Modified Joey Score: 0 - no symptoms    [id number] Scale:   Age: 2 - 75 years old or older  CHF History: 0 - no  HTN History: 0 - no  Stroke/TIA/Thromboembolism History: 2  - yes  Vascular Disease History: 0 - no  Diabetes Mellitus in History: 0 - no  Female: 0 - no  [id number] Scale Total: 4      Laboratory:  CMP:   Recent Labs  Lab 05/20/17 2115   CALCIUM 10.2   ALBUMIN 4.1   PROT 7.1      K 4.1   CO2 24      BUN 21   CREATININE 0.8   ALKPHOS 37*   ALT 17   AST 20   BILITOT 0.8     CBC:   Recent Labs  Lab 05/20/17 2115   WBC 15.60*   RBC 4.61   HGB 14.5   HCT 43.5      MCV 94   MCH 31.5*   MCHC 33.3     Lipid Panel:   Recent Labs  Lab 05/20/17 2115   CHOL 165   LDLCALC 99.6   HDL 56   TRIG 47     Coagulation:   Recent Labs  Lab 05/20/17 2115   INR 1.0     Platelet Aggregation Study: No results for input(s): PLTAGG, PLTAGINTERP, PLTAGREGLACO, ADPPLTAGGREG in the last 168 hours.  Hgb A1C: No results for input(s): HGBA1C in the last 168 hours.  TSH:   Recent Labs  Lab 05/20/17 2115   TSH 1.591       Diagnostic Results:  Brain Imaging:   CT head w/o contrast 5-20-17 results:  Focal hypodensity in the right basal ganglia, in a region of artifact, likely reflects sequela of the same, no evidence of large vascular territory infarct.    Cardiac Evaluation:   EKG/Telemetry: 5-20-17 unconfirmed results:  Sinus bradycardia with 1st degree A-V block  Left axis deviation  Anterior infarct (cited on or before 20-MAY-2017)    Assessment/Plan:     Patient is a 77 y.o. year old male with:    * Dysphagia  Will be evaluated for complete stroke workup  including aspirin 81 mg daily  Lipitor 20 mg daily and will check lipid profile  Therapy: PT/OT/ST  Diagnostics: MRI/MRA Brain and neck, labs, 2 d echo  Nursing: Swallow eval, neuro checks, SCD's, vitals, stroke education  VTE: SCD's and heparin 5000 units sc Q8H  IV fluids, NS 75 ml/hr    PAF (paroxysmal atrial fibrillation)  Not on anticoagulation, risk factor    Facial droop  See above    Dysarthria  See above    Hypothyroidism due to acquired atrophy of thyroid  TSH  Continue home thyroid replacement      Thrombolysis  Candidate? No  1. Contraindications: Outside of treament window and Unclear onset of symptoms    Interventional Revascularization Candidate?  No; No significant neurological deficit    Research Candidate? No    Janett Griffin NP  [facility]  Department of Vascular Neurology   [facility]

## 2017-05-21 NOTE — PT/OT/SLP EVAL
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    GERD (gastroesophageal reflux disease)     Hypothyroid     Memory loss last months.     Past Surgical History:   Procedure Laterality Date    COLONOSCOPY  6/23/2000  ([place])    Internal hemorrhoids, otherwise normal exam.    COLONOSCOPY W/ POLYPECTOMY  2/12/2010  [place]    One less than 1 mm polyp in the distal sigmoid.  TUBULAR ADENOMA.    One less than 1 mm polyp in the cecum.  TUBULAR ADENOMA.    Non-bleeding internal hemorrhoids.       UPPER GASTROINTESTINAL ENDOSCOPY  2/7/2007  (Dr. Bourgeois)    Grade 1 reflux esophagitis.  Small/medium hiatal hernia.  Pylorus erythema.    UPPER GASTROINTESTINAL ENDOSCOPY  2/12/2010  [place]    Slight reflux esophagitis.  Z-line regular, 30 cm from the incisors.   Moderate / large hiatus hernia.  Normal stomach and duodenum.  SELAM Test  Negative.        Referring physician: LINDSAY Griffin  Date referred to PT: 5/21/2017     General Precautions: Standard, aspiration, fall, NPO  Orthopedic Precautions : N/A  Braces: N/A    Do you have any cultural, spiritual, Christian conflicts, given your current situation?: none    Patient History:  Lives With: spouse  Living Arrangements: apartment  Transportation Available: car  Living Environment Comment: Pt lives with his wife at [facility] in the independent living area.  Pt reports that he owns no DME and was completely independent PTA.    Equipment Currently Used at Home: none    Previous Level of Function:  Ambulation Skills: independent  Transfer Skills: independent  ADL Skills:  independent    Subjective:  Communicated with RN prior to session.    Pt agreeable to PT eval    Chief Complaint: none stated  Patient goals: to get better    Pain Ratin/10   Pain Rating Post-Intervention: 0/10    Objective:  Patient found supine in bed     Patient found with: peripheral IV, telemetry    Cognitive Exam:  Oriented to: Person, Place, Time and Situation  Follows Commands/attention: Follows two-step commands  Communication: clear/fluent  Safety awareness/insight to disability: intact    Physical Exam:  Postural examination/scapula alignment: Rounded shoulder, Head forward and Posterior pelvic tilt    Skin integrity: Visible skin intact  Edema: None noted     Sensation:   Intact    Lower Extremity Range of Motion:  Right Lower Extremity: WFL  Left Lower Extremity: WFL    Lower Extremity Strength:  Right Lower Extremity: WFL  Left Lower Extremity: WFL    Functional Mobility:    Bed Mobility:    Scooting/Bridging: Stand by Assistance (to EOB)  Supine to Sit: Stand by Assistance    Transfers:    Sit <> Stand Assistance: Contact Guard Assistance  Sit <> Stand Assistive Device: No Assistive Device  Bed <> Chair Technique: Stand Pivot  Bed <> Chair Assistance: Contact Guard Assistance  Bed <> Chair Assistive Device: No Assistive Device    Ambulation:    Gait Distance: ~75 feet.  Pt was initially able to ambulate with no AD and CGA, but required Min A to correct two minor LOB during the last ~20 feet.  Pt reported that he was feeling lightheaded at the conclusion of gait training.  Pt was returned to a seated position and cued on pursed lip breathing.  Pt reported that his symptoms quickly resolved and he remained fully oriented.    Assistance 1: Contact Guard Assistance, Minimum assistance  Gait Assistive Device: No device  Gait Pattern: reciprocal  Gait Deviation(s): decreased angela, decreased step length, decreased toe-to-floor clearance, decreased weight-shifting ability    Balance:   Static Sit:  GOOD: Takes MODERATE challenges from all directions  Dynamic Sit:  GOOD: Maintains balance through MODERATE excursions of active trunk movement  Static Stand: FAIR+: Takes MINIMAL challenges from all directions  Dynamic stand: FAIR: Needs CONTACT GUARD during gait    Therapeutic Activities and Exercises:  PT evaluation performed.  White board updated.  Pt educated on role of PT, safety with functional mobility.     Pt safe to transfer with RN staff    Patient left up in chair with all lines intact, call button in reach, RN notified and wife present.    AM-PAC 6 CLICK MOBILITY  1 = Unable, Total/Dependent Assistance  2 = A lot, Maximum/Moderate Assistance  3 = A little, Minimum/Contact Guard/Supervision  4 = None, Modified [place]/Independent    Turning over in bed (including adjusting bedclothes, sheets and blankets)?: 4  Sitting down on and standing up from a chair with arms (e.g., wheelchair, bedside commode, etc.): 3  Moving from lying on back to sitting on the side of the bed?: 4  Moving to and from a bed to a chair (including a wheelchair)?: 3  Need to walk in hospital room?: 3  Climbing 3-5 steps with a railing?: 3  Total Score: 20    AM-PAC Raw Score   CMS G-Code Modifier   Level of Impairment   Assistance     6   CN   100%         Total / Unable   7 - 9   CM   80 - 100%   Maximal Assist     10 - 14   CL   60 - 80%   Moderate Assist     15 - 19   CK   40 - 60%   Moderate Assist     20 - 22   CJ   20 - 40%   Minimal Assist     23   CI   1-20%         SBA / CGA     24 CH   0%   Independent/Modified Independent       Assessment:  Taran Crowell is a 77 y.o. 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 He presents with deficits listed below.  Pt tolerated evaluation well and is pleasant and motivated.  Pt is a good candidate for skilled PT services to address the below deficits and to increase functional independence.      Rehab identified problem  list/impairments: weakness, impaired endurance, impaired self care skills, impaired functional mobilty, gait instability    Rehab potential is good.    Activity tolerance: Good    Discharge Facility/Level Of Care Needs: outpatient PT (or HHPT.  Pt wanted to think about what he would prefer)    Barriers to Discharge: None    Equipment Needed After Discharge:  (possibly RW or SPC.  )    GOALS:    Physical Therapy Goals        Problem: Physical Therapy Goal    Goal Priority Disciplines Outcome Goal Variances Interventions   Physical Therapy Goal     PT/OT, PT Ongoing (interventions implemented as appropriate)     Description:  Goals to be met by: 17     Patient will increase functional independence with mobility by performin. Supine to sit with Modified [place]  2. Sit to supine with Modified [place]  3. Sit to stand transfer with Modified [place]  4. Bed to chair transfer with Modified [place] using LRAD  5. Gait  x 200 feet with Supervision using LRAD.   6. Lower extremity exercise program x 30 reps per handout, with independence                      PLAN:    Patient to be seen 6 x/week to address the above listed problems via gait training, therapeutic activities, therapeutic exercises, neuromuscular re-education  Plan of Care Expires: 17  Plan of Care reviewed with: patient, spouse    Abraham Leal, PT, DPT  2017   [phone]

## 2017-05-21 NOTE — PLAN OF CARE
Problem: Physical Therapy Goal  Goal: Physical Therapy Goal  Goals to be met by: 17     Patient will increase functional independence with mobility by performin. Supine to sit with Modified [place]  2. Sit to supine with Modified [place]  3. Sit to stand transfer with Modified [place]  4. Bed to chair transfer with Modified [place] using LRAD  5. Gait  x 200 feet with Supervision using LRAD.   6. Lower extremity exercise program x 30 reps per handout, with independence    Outcome: Ongoing (interventions implemented as appropriate)  PT eval complete and POC initiated.

## 2017-05-21 NOTE — PROGRESS NOTES
In and out cath attempted with 16 Fr catheter, and 14 FR catheter. Unable to bypass the prostate. Crede catheter obtained. Able to bypass the prostate and obtained 850 cc dark yellow urine. Patient had been unable to void. He states that this is a chronic problem for which he takes medication.

## 2017-05-21 NOTE — PLAN OF CARE
Problem: SLP Goal  Goal: SLP Goal  Speech Language Pathology Goals  Goals expected to be met by 5/28  1. Pt will participate in swallow assessment  2. Continue to assess higher-level problem solving/math skills  3. Pt will complete basic drawing/tracing/scanning tasks with 90% accuracy, MOD I, to improve visiospatial skills  4. Pt will recall 3/3 un-related items post 5 minute delay, 90% of attempts, MOD I, to improve STM skills  5. Pt will complete fx reading tasks with 100% accuracy, 90% of attempts, MOD I, to improve receptive language and reading comprehension skills              Outcome: Ongoing (interventions implemented as appropriate)  Speech, language and cognitive assessment completed. Bedside Swallow Evaluation on hold per nurse 2/2 Patient NPO for testing. Please see report for full details. POC initiated and ST to continue to follow.     CANDIDA Stanton., [facility]-SLP  Speech-Language Pathology  Pager: [phone]  5/21/2017

## 2017-05-21 NOTE — ASSESSMENT & PLAN NOTE
Antithrombotics: aspirin 81 mg daily  Statins: Lipitor 20 mg daily   Therapy: PT/OT/ST  Diagnostics: MRI/MRA Brain and neck, labs, 2 d echo  Nursing: Swallow eval, neuro checks, SCD's, vitals, stroke education  VTE: SCD's and heparin 5000 units sc Q8H  IV fluids, NS 75 ml/hr

## 2017-05-21 NOTE — PT/OT/SLP EVAL
Speech Language Pathology Evaluation    Taran Crowell   MRN: [id number]   Admitting Diagnosis: Dysphagia    Diet recommendations: Solid Diet Level: NPO  Liquid Diet Level: NPO     SLP Treatment Date: 05/21/17  Speech Start Time: 0809     Speech Stop Time: 0833     Speech Total (min): 24 min       TREATMENT BILLABLE MINUTES:  Eval 24     Diagnosis: Dysphagia  Diagnoses of Dysphagia, Dysphagia, unspecified type, Dysarthria, Facial droop, Hypothyroidism due to acquired atrophy of thyroid, and PAF (paroxysmal atrial fibrillation) were pertinent to this visit.      Past Medical History:   Diagnosis Date    Arthritis     Atrial fibrillation     BPH (benign prostatic hyperplasia)     Change in mental status 3-4 weeks ago.    GERD (gastroesophageal reflux disease)     Hypothyroid     Memory loss last months.     Past Surgical History:   Procedure Laterality Date    [place]  6/23/[place]  ([place])    Internal hemorrhoids, otherwise normal exam.    COLONOSCOPY W/ POLYPECTOMY  2/12/2010  [place]    One less than 1 mm polyp in the distal sigmoid.  TUBULAR ADENOMA.    One less than 1 mm polyp in the cecum.  TUBULAR ADENOMA.    Non-bleeding internal hemorrhoids.       UPPER GASTROINTESTINAL ENDOSCOPY  2/7/2007  (Dr. Bourgeois)    Grade 1 reflux esophagitis.  Small/medium hiatal hernia.  Pylorus erythema.    UPPER GASTROINTESTINAL ENDOSCOPY  2/12/2010  [place]    Slight reflux esophagitis.  Z-line regular, 30 cm from the incisors.   Moderate / large hiatus hernia.  Normal stomach and duodenum.  SELAM Test  Negative.        Has the patient been evaluated by SLP for swallowing? : No ([facility] 2/2 nurse hold/Patient NPO for testing this am)  Keep patient NPO?: Yes   General Precautions: Standard, aspiration, fall, NPO, vision impaired, other (see comments) (Dysarthria)          Social Hx: Patient lives with spouse in assisted living apartments at [facility]. HE has 4 children(3, 1 step child) and 11 grandsons.  "    Occupational/hobbies/homemaking: Retired, . He enjoys reading and lecturing in his free time. 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    Subjective:  SLP communicated with nurse prior to session today, nurse explains Patient NPO for testing this am but clears pt for speech/cog eval  Patient explains, "I noticed when I was reading the newspaper earlier that the Left eye is not the same, not all of the time but sometimes it feels blurry."  Patient's spouse explains, "His speech was much more slurred on Friday than it is now."  Patient goals: "To be able to swallow again."  He denies pain.  Pain Ratin/10  Pain Rating Post-Intervention: 0/10    Objective:   Patient found with: telemetry, pulse ox (continuous), blood pressure cuff, sitting up in bed wearing reading glasses and reading the newspaper. Wife at bedside.     Oral Musculature Evaluation  Dentition: present and adequate  Secretion Management: coughing on secretions, other (see comments) (pt using [place] at bedside to manage secretions)  Mucosal Quality: good  Mandibular Strength and Mobility: WFL  Oral Labial Strength and Mobility: WFL  Lingual Strength and Mobility:  (pt reports tongue feels "thick;" however, functional laterlization/protrusion/depression noted at bedside)  Buccal Strength and Mobility: WFL  Volitional Cough: present and productive  Oral Musculature Comments: JENNA to assess swallow at bedside this am 2/2 nurse hold, pt NPO for testing     Cognitive Status:  Behavioral Observations: appropriate and cooperative-  Memory and Orientation: Patient oriented x4.  LTM intact. Patient recalls 3-4 numbers and words for immediate recall WFL. He recalls 1/3 unrelated items post 5 minute filled detail provided moderate verbal (categorical and multiple choice) cueing.  Attention: Patient sustains attention to all tx tasks appropirately.  Mild difficulty noted with divided attention tasks.   Problem Solving: Patient answers " basic safety/reasoning questions WFL. He sequences items verbally presented (FO4) WFL.  Further assessment of higher-level math warranted.   Pragmatics: WFL.  Patient with normal eye contact, topic maintenence and give and take t/o conversational tasks.   Executive Function: Patient with mild difficulty on higher-level organization, mental flexibility, planning and self-monitoring He sequences items verbally presented (FO4) WFL.    Auditory Comprehension: MOD I. Patient is able to ID body parts, able to identify objects, answers yes/no questions and able to follow commands at the basic, complex and m/u  levels WFL    Verbal Expression: Supervision. Patient with mild difficulty on basic repetition tasks. No difficulty noted for responsive speech, naming, automatic speech and conversational speech    Motor Speech: Pt with Mild Dysarthria c/b mildly decreased articulatory precision and intensity at the sentence/conversational level.     Voice: Patient with mildly decreased intensity sustained phonation.     Augmentative Alternative Communication: DNA 2/2 expressive/receptive communication skills    Reading: Patient with mildly decreased, oral reading ability and comprehension for functional (newspaper) reading tasks today.     Writing: WFL for basic fx name writing, copying ability and writing to dictation at the sentence level.    Visual-Spatial: Impaired. Patient wears reading glasses and demonstrates L deficits (ongoing assessment warranted to r/u L neglect v. field cut. )    Additional Treatment:  Patient and Spouse educated on SLP role, aspiration precautions, need for swallow assessment once Patient is cleared from nurse hold,  And ongoing POC. No further questions noted. Whiteboard updated with recommendations. Findings reviewed with nurse.     FIM:  Social Interaction: 7 Complete [place]--The patient interacts appropriately with staff, other patients, and family members (e.g., controls temper, accepts  criticism, is aware that words and actions have an impact on others), and does not require medication for   Problem Solvin Supervision--The patient requires supervision (e.g., cueing or coaxing) to solve less routine problems only under stressful or unfamiliar conditions, but no more than 10% of the time.   Comprehension: 6 Modified [place]--In most situations, the patient understands readily or with only mild dificulty complex or abstract directions and conversation.  The patient does not require prompting, though (s)he may require a hearing or visual aid, other x   Expression: 5 Standby Prompting--The patient expresses basic daily needs and ideas more than 90% of the time.  Requires prompting (e.g., frequent repetition) less than 10% of the time to be understood.   Memory: 4 Minimal Prompting--The patient recognizes and remembers 75 to 90% of the time.     Assessment:  Taran Crowell is a 77 y.o. male with a SLP diagnosis of Mild Dysarthria and Mild Cognitive Impairment with visiospatial deficits.  SLP unable to assess swallow fx this service day secondary to Nurse hold/Patient NPO for testing. ST to continue to follow.     Do you have any cultural, spiritual, Jewish conflicts, given your current situation?: none noted    Discharge recommendations: Discharge Facility/Level Of Care Needs: home health speech therapy (pending pt progress and PT/OT recs)     Goals:    SLP Goals        Problem: SLP Goal    Goal Priority Disciplines Outcome   SLP Goal     SLP Ongoing (interventions implemented as appropriate)   Description:  Speech Language Pathology Goals  Goals expected to be met by   1. Pt will participate in swallow assessment  2. Continue to assess higher-level problem solving/math skills  3. Pt will complete basic drawing/tracing/scanning tasks with 90% accuracy, MOD I, to improve visiospatial skills  4. Pt will recall 3/3 un-related items post 5 minute delay, 90% of attempts, MOD I, to improve  STM skills  5. Pt will complete fx reading tasks with 100% accuracy, 90% of attempts, MOD I, to improve receptive language and reading comprehension skills  6. Pt will complete repetition tasks at sentence level with 90% intelligibility, MOD I, to improve speech                                  Plan:   Patient to be seen Therapy Frequency: 5 x/week   Plan of Care expires: 06/20/17  Plan of Care reviewed with: patient, spouse  SLP Follow-up?: Yes  SLP - Next Visit Date: 05/22/17      SLP G-Codes  Functional Assessment Tool Used: NOMS   Score: 5  Functional Limitations: Motor speech  Motor Speech Current Status ():   Motor Speech Goal Status (): COCO Lai, CCC-SLP  Speech-Language Pathology  Pager: [phone]  5/21/2017

## 2017-05-21 NOTE — SUBJECTIVE & OBJECTIVE
Neurologic Chief Complaint: dysphagia    Subjective:     Interval History: Patient is seen for follow-up neurological assessment and treatment recommendations: Reports improvement in dysarthria and weakness but continued dysphagia. Per wife some unresponsiveness during the event.     Review of patient's allergies indicates:  No Known Allergies    Medications: I have reviewed the current medication administration record.    Prescriptions Prior to Admission   Medication Sig Dispense Refill Last Dose    atorvastatin (LIPITOR) 20 MG tablet Take 1 tablet (20 mg total) by mouth once daily. 90 tablet 3 5/19/2017    co-enzyme Q-10 50 mg capsule Take 50 mg by mouth once daily.   Past Week    doxazosin (CARDURA) 8 MG Tab TAKE 1 TABLET BY MOUTH EVERY EVENING 90 tablet 11 5/20/2017    mirtazapine (REMERON) 30 MG tablet Take 1 tablet (30 mg total) by mouth nightly. 30 tablet 5 5/19/2017    montelukast (SINGULAIR) 10 mg tablet Take 10 mg by mouth once daily.  5 Past Month    omeprazole (PRILOSEC) 40 MG capsule Take 1 capsule (40 mg total) by mouth once daily. 30 capsule 11 5/20/2017    oxybutynin (DITROPAN-XL) 10 MG 24 hr tablet TAKE 1 TABLET EVERY DAY 90 tablet 3 5/19/2017    spironolactone (ALDACTONE) 25 MG tablet TAKE 1 TABLET EVERY DAY 90 tablet 11 Past Week    SYNTHROID 75 mcg tablet Take 1 tablet (75 mcg total) by mouth once daily. 30 tablet 11 Taking       Review of Systems   Constitutional: Negative for chills and fever.   HENT: Negative for drooling and ear discharge.    Eyes: Negative for pain and redness.   Respiratory: Negative for cough and shortness of breath.    Cardiovascular: Negative for chest pain.   Gastrointestinal: Negative for abdominal pain and constipation.        Dysphagia   Endocrine: Negative for heat intolerance and polydipsia.   Genitourinary: Negative for difficulty urinating and dysuria.   Musculoskeletal: Negative for arthralgias and back pain.   Skin: Negative for rash and wound.    Neurological: Negative for tremors, facial asymmetry and speech difficulty.   Psychiatric/Behavioral: Negative for agitation and confusion.     Objective:     Vital Signs (Most Recent):  Temp: 98 °F (36.7 °C) (05/21/17 0400)  Pulse: 63 (MRI) (05/21/17 0949)  Resp: 16 (05/21/17 0800)  BP: (!) 142/68 (05/21/17 0800)  SpO2: 98 % (05/21/17 0800)    Vital Signs Range (Last 24H):  Temp:  [98 °F (36.7 °C)-98.4 °F (36.9 °C)]   Pulse:  [51-98]   Resp:  [16-21]   BP: (135-157)/(68-87)   SpO2:  [95 %-98 %]     Physical Exam   Constitutional: He is oriented to person, place, and time. He appears well-developed and well-nourished.   HENT:   Head: Normocephalic and atraumatic.   Eyes: Pupils are equal, round, and reactive to light.   Neck: Normal range of motion.   Cardiovascular: Normal rate.    Pulmonary/Chest: Effort normal.   Abdominal: Soft.   Musculoskeletal: Normal range of motion.   Neurological: He is alert and oriented to person, place, and time. GCS eye subscore is 4 - spontaneous. GCS verbal subscore is 5 - oriented. GCS motor subscore is 6 - obeys commands.   Facial droop, dysarthriA   Skin: Skin is warm and dry.   Psychiatric: He has a normal mood and affect.   Nursing note and vitals reviewed.      Neurological Exam:   LOC: alert and follows requests  Language: No aphasia  Speech: No dysarthria  Orientation: Person, Place, Time  Visual Fields (CN II): Full  EOM (CN III, IV, VI): Full/intact  Oculocephalics: normal  Pupils (CN III, IV, VI): PERRL  Facial Movement (CN VII): lower weakness left lower  Hearing (CN VIII): intact bilaterally  Gag Reflex (CN IX, X): normal/symmetric  Shoulder/Neck (CN XI): SCM-Left: Normal ; SCM-Right: Normal ; Shoulder Shrug: Normal/Symetric  Tongue (CN XII): to midline  Motor*: Arm Left:  Normal (5/5), Leg Left:   Normal (5/5), Arm Right:   Normal (5/5), Leg Right:   Normal (5/5)  Tone: Arm-Left: normal; Leg-Left: normal; Arm-Right: normal; Leg-Right: normal    NIH Stroke  Scale:  Interval: baseline (upon arrival/admit)  Level of Consciousness: 0 - alert  LOC Questions: 0 - answers both correctly  LOC Commands: 0 - performs both correctly  Best Gaze: 0 - normal  Visual: 0 - no visual loss  Facial Palsy: 1 - minor  Motor Left Arm: 0 - no drift  Motor Right Arm: 0 - no drift  Motor Left Le - no drift  Motor Right Le - no drift  Limb Ataxia: 0 - absent  Sensory: 0 - normal  Best Language: 0 - no aphasia  Dysarthria: 1 - mild to moderate dysarthria  Extinction and Inattention: 0 - no neglect  NIH Stroke Scale Total: 2  Justyna Coma Scale:  Best Eye Response: 4 - spontaneous  Best Motor Response: 6 - obeys commands  Best Verbal Response: 5 - oriented  Justyna Coma Scale Total: 15  Modified Joey Scale:   Timeline: Prior to symptoms onset  Modified Smith Score: 0 - no symptoms    [id number] Scale:   Age: 2 - 75 years old or older  CHF History: 0 - no  HTN History: 0 - no  Stroke/TIA/Thromboembolism History: 2 - yes  Vascular Disease History: 0 - no  Diabetes Mellitus in History: 0 - no  Female: 0 - no  [id number] Scale Total: 4      Laboratory:  CMP:     Recent Labs  Lab 17   CALCIUM 10.2   ALBUMIN 4.1   PROT 7.1      K 4.1   CO2 24      BUN 21   CREATININE 0.8   ALKPHOS 37*   ALT 17   AST 20   BILITOT 0.8     CBC:     Recent Labs  Lab 17   WBC 15.60*   RBC 4.61   HGB 14.5   HCT 43.5      MCV 94   MCH 31.5*   MCHC 33.3     Lipid Panel:     Recent Labs  Lab 17   CHOL 165   LDLCALC 99.6   HDL 56   TRIG 47     Coagulation:     Recent Labs  Lab 17   INR 1.0     Platelet Aggregation Study: No results for input(s): PLTAGG, PLTAGINTERP, PLTAGREGLACO, ADPPLTAGGREG in the last 168 hours.  Hgb A1C: No results for input(s): HGBA1C in the last 168 hours.  TSH:     Recent Labs  Lab 17   TSH 1.591       Diagnostic Results:  Brain Imaging:   CT head w/o contrast 17 results:  Focal hypodensity in the right  basal ganglia, in a region of artifact, likely reflects sequela of the same, no evidence of large vascular territory infarct.    Cardiac Evaluation:   EKG/Telemetry: 5-20-17 unconfirmed results:  Sinus bradycardia with 1st degree A-V block  Left axis deviation  Anterior infarct (cited on or before 20-MAY-2017)

## 2017-05-21 NOTE — PT/OT/SLP EVAL
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    GERD (gastroesophageal reflux disease)     Hypothyroid     Memory loss last months.      Past Surgical History:   Procedure Laterality Date    [place]ON[place]  6/23/2000  ([place])    Internal hemorrhoids, otherwise normal exam.    COLONOSCOPY W/ POLYPECTOMY  2/12/2010  [place]    One less than 1 mm polyp in the distal sigmoid.  TUBULAR ADENOMA.    One less than 1 mm polyp in the cecum.  TUBULAR ADENOMA.    Non-bleeding internal hemorrhoids.       UPPER GASTROINTESTINAL ENDOSCOPY  2/7/2007  (Dr. Bourgeois)    Grade 1 reflux esophagitis.  Small/medium hiatal hernia.  Pylorus erythema.    UPPER GASTROINTESTINAL ENDOSCOPY  2/12/2010  [place]    Slight reflux esophagitis.  Z-line regular, 30 cm from the incisors.   Moderate / large hiatus hernia.  Normal stomach and duodenum.  SELAM Test  Negative.        Referring physician: LINDSAY Griffin  Date referred to OT: 5/20/17    General Precautions: Standard, aspiration, fall, NPO (cardiac)    Do you have any cultural, spiritual, Hinduism conflicts, given your current situation?: [facility]     Patient History:  Living Environment  Living Environment Comment: Pt resides with spouse at [facility] in the Independent living area with no steps to enter in an apartment on the 5th floor.  Pt has no DME.    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     Dominant hand: right    Subjective:  Communicated with RN prior to session.  Pt reported that he was doing much better today.  Chief Complaint: dizziness with standing  Patient/Family stated goals: to get better    Pain Rating Post-Intervention: 0/10    Objective:  Patient found with: peripheral IV, telemetry    Cognitive Exam:  Oriented to: Person, Place, Time and Situation  Follows Commands/attention: Follows two-step commands  Communication: dysarthria  Memory:  No Deficits noted  Safety awareness/insight to disability: intact  Coping skills/emotional control: Appropriate to situation    Visual/perceptual:  Intact    Physical Exam:  Postural examination/scapula alignment: Rounded shoulder, Head forward and Posterior pelvic tilt  Skin integrity: Visible skin intact  Edema: None noted     Sensation:   Intact  light/touch BUE    Upper Extremity Range of Motion:  Right Upper Extremity: WFL  Left Upper Extremity: WFL    Upper Extremity Strength:  Right Upper Extremity: WFL  Left Upper Extremity: WFL   Strength: WFL    Fine motor coordination:   Intact  Left hand thumb/finger opposition skills and Right hand thumb/finger opposition skills    Gross motor coordination: WFL    Functional Mobility:  Bed Mobility:  Supine to Sit: Stand by Assistance  Sit to Supine: Stand by Assistance    Transfers:  Sit <> Stand Assistance: Contact Guard Assistance (from EOB)  Sit <> Stand Assistive Device: No Assistive Device    Functional Ambulation: Pt required CGA-Min (A) with functional mobility to bathroom & back during ADL's (with increased dizziness with prolonged time standing & moving around).    Activities of Daily Living:     UE Dressing Level of  "Assistance: Minimum assistance (donning gown around back while seated EOb)  LE Dressing Level of Assistance: Stand by assistance (donning socks while seated EOB)  Grooming Position: Standing at sink  Grooming Level of Assistance: Contact guard assistance     Therapeutic Activities and Exercises:  Pt required Supervision with postural control seated EOB & CGA while standing.  Pt had no further questions & when asked whether there were any concerns pt reported none.      AM-PAC 6 CLICK ADL  How much help from another person does this patient currently need?  1 = Unable, Total/Dependent Assistance  2 = A lot, Maximum/Moderate Assistance  3 = A little, Minimum/Contact Guard/Supervision  4 = None, Modified [place]/Independent    Putting on and taking off regular lower body clothing? : 3  Bathing (including washing, rinsing, drying)?: 3  Toileting, which includes using toilet, bedpan, or urinal? : 3  Putting on and taking off regular upper body clothing?: 3  Taking care of personal grooming such as brushing teeth?: 3  Eating meals?: 1  Total Score: 16    AM-PAC Raw Score CMS "G-Code Modifier Level of Impairment Assistance   6 % Total / Unable   7 - 9 CM 80 - 100% Maximal Assist   10 - 14 CL 60 - 80% Moderate Assist   15 - 19 CK 40 - 60% Moderate Assist   20 - 22 CJ 20 - 40% Minimal Assist   23 CI 1-20% SBA / CGA   24 CH 0% Independent/ Mod I       Patient left supine with all lines intact, call button in reach, RN notified, wife present and white board updated.    Assessment:  Taran Crowell is a 77 y.o. 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  Pt would benefit from OT to address independence with ADL's.    Rehab identified problem list/impairments: Rehab identified problem list/impairments: weakness, impaired endurance, impaired self " care skills, impaired functional mobilty, impaired balance    Rehab potential is fair.    Activity tolerance: Fair    Discharge recommendations: Discharge Facility/Level Of Care Needs: outpatient OT     GOALS:    Occupational Therapy Goals        Problem: Occupational Therapy Goal    Goal Priority Disciplines Outcome Interventions   Occupational Therapy Goal     OT, PT/OT Ongoing (interventions implemented as appropriate)    Description:  Goals to be met by: 5/28/17     Patient will increase functional independence with ADLs by performing:    UE Dressing with Modified [place].  LE Dressing with Modified [place].  Grooming while standing with Supervision.  Toileting from toilet with Supervision for hygiene and clothing management.   Rolling to Bilateral with Modified [place].   Supine to sit with Modified [place].  Stand pivot transfers with Supervision.                      PLAN:  Patient to be seen 6 x/week to address the above listed problems via self-care/home management, cognitive retraining, sensory integration, therapeutic activities, therapeutic exercises, neuromuscular re-education  Plan of Care expires: 06/20/17  Plan of Care reviewed with: patient, spouse    OT G-codes  Functional Assessment Tool Used: [facility]  Score: 16  Functional Limitation: Self care  Self Care Current Status (): CK  Self Care Goal Status (): SAVANNAH Holt  05/21/2017

## 2017-05-21 NOTE — HOSPITAL COURSE
76 y/o male with dysphagia, facial droop and dysarthria. HX of afib and not on anticoagulation. W/U in progress as well as medical w/u for other causes    Passed for diet on 5/22/17  Therapy recommending home health     Per review of meds with patient and family - patient was instructed to stop thyroid medication by pcp. Will not restart.   Patient requesting suctioning device for home health. Discussed with speech therapy team that has seen patient. Other DME ordered as recommended by teams.   Patient previously on lipitor 20 mg, will increase to 40 mg daily due to LDL > 90

## 2017-05-21 NOTE — ED NOTES
Received report from AARON Sanders. Resume care. Pt arriving in room 2 at this time. Pt resting comfortably and independently repositioned in stretcher with bed locked in lowest position for safety. NAD and patient states mild pain to his head at this time. Respirations even and unlabored and visible chest rise noted. Patient offered bathroom assistance and denies need at this time. Pt instructed to call if assistance is needed. Pt on continuous cardiac, BP, and O2 monitoring. No needs at this time. Will continue to monitor. Call light within reach. Family at bedside

## 2017-05-22 VITALS
HEART RATE: 57 BPM | RESPIRATION RATE: 20 BRPM | BODY MASS INDEX: 31.82 KG/M2 | HEIGHT: 66 IN | WEIGHT: 198 LBS | OXYGEN SATURATION: 96 % | TEMPERATURE: 97 F | DIASTOLIC BLOOD PRESSURE: 69 MMHG | SYSTOLIC BLOOD PRESSURE: 126 MMHG

## 2017-05-22 LAB
ALBUMIN SERPL BCP-MCNC: 3.4 G/DL
ALP SERPL-CCNC: 35 U/L
ALT SERPL W/O P-5'-P-CCNC: 14 U/L
ANION GAP SERPL CALC-SCNC: 9 MMOL/L
APTT BLDCRRT: 25.1 SEC
AST SERPL-CCNC: 17 U/L
BASOPHILS # BLD AUTO: 0.02 K/UL
BASOPHILS NFR BLD: 0.2 %
BILIRUB SERPL-MCNC: 0.9 MG/DL
BUN SERPL-MCNC: 15 MG/DL
CALCIUM SERPL-MCNC: 8.7 MG/DL
CHLORIDE SERPL-SCNC: 111 MMOL/L
CK MB SERPL-MCNC: 1.4 NG/ML
CK MB SERPL-RTO: 2.4 %
CK SERPL-CCNC: 59 U/L
CO2 SERPL-SCNC: 22 MMOL/L
CREAT SERPL-MCNC: 0.7 MG/DL
DIFFERENTIAL METHOD: ABNORMAL
EOSINOPHIL # BLD AUTO: 0.2 K/UL
EOSINOPHIL NFR BLD: 1.6 %
ERYTHROCYTE [DISTWIDTH] IN BLOOD BY AUTOMATED COUNT: 14.3 %
EST. GFR  (AFRICAN AMERICAN): >60 ML/MIN/1.73 M^2
EST. GFR  (NON AFRICAN AMERICAN): >60 ML/MIN/1.73 M^2
ESTIMATED AVG GLUCOSE: 111 MG/DL
GLUCOSE SERPL-MCNC: 78 MG/DL
HBA1C MFR BLD HPLC: 5.5 %
HCT VFR BLD AUTO: 39.7 %
HGB BLD-MCNC: 13.4 G/DL
INR PPP: 1
LYMPHOCYTES # BLD AUTO: 2.6 K/UL
LYMPHOCYTES NFR BLD: 25.1 %
MAGNESIUM SERPL-MCNC: 2 MG/DL
MCH RBC QN AUTO: 32 PG
MCHC RBC AUTO-ENTMCNC: 33.8 %
MCV RBC AUTO: 95 FL
MONOCYTES # BLD AUTO: 0.7 K/UL
MONOCYTES NFR BLD: 6.5 %
NEUTROPHILS # BLD AUTO: 7 K/UL
NEUTROPHILS NFR BLD: 66.3 %
PHOSPHATE SERPL-MCNC: 3.1 MG/DL
PLATELET # BLD AUTO: 219 K/UL
PMV BLD AUTO: 10.9 FL
POTASSIUM SERPL-SCNC: 3.6 MMOL/L
PROT SERPL-MCNC: 5.9 G/DL
PROTHROMBIN TIME: 10.7 SEC
RBC # BLD AUTO: 4.19 M/UL
SODIUM SERPL-SCNC: 142 MMOL/L
TROPONIN I SERPL DL<=0.01 NG/ML-MCNC: <0.006 NG/ML
WBC # BLD AUTO: 10.52 K/UL

## 2017-05-22 PROCEDURE — 25000003 PHARM REV CODE 250: Performed by: NURSE PRACTITIONER

## 2017-05-22 PROCEDURE — C9113 INJ PANTOPRAZOLE SODIUM, VIA: HCPCS | Performed by: PHYSICIAN ASSISTANT

## 2017-05-22 PROCEDURE — 99233 SBSQ HOSP IP/OBS HIGH 50: CPT | Mod: ,,, | Performed by: PSYCHIATRY & NEUROLOGY

## 2017-05-22 PROCEDURE — 85610 PROTHROMBIN TIME: CPT

## 2017-05-22 PROCEDURE — 84484 ASSAY OF TROPONIN QUANT: CPT

## 2017-05-22 PROCEDURE — 97530 THERAPEUTIC ACTIVITIES: CPT

## 2017-05-22 PROCEDURE — 83036 HEMOGLOBIN GLYCOSYLATED A1C: CPT

## 2017-05-22 PROCEDURE — 83735 ASSAY OF MAGNESIUM: CPT

## 2017-05-22 PROCEDURE — 97116 GAIT TRAINING THERAPY: CPT

## 2017-05-22 PROCEDURE — 85730 THROMBOPLASTIN TIME PARTIAL: CPT

## 2017-05-22 PROCEDURE — 25000003 PHARM REV CODE 250: Performed by: PHYSICIAN ASSISTANT

## 2017-05-22 PROCEDURE — 63600175 PHARM REV CODE 636 W HCPCS: Performed by: PHYSICIAN ASSISTANT

## 2017-05-22 PROCEDURE — 82553 CREATINE MB FRACTION: CPT

## 2017-05-22 PROCEDURE — 85025 COMPLETE CBC W/AUTO DIFF WBC: CPT

## 2017-05-22 PROCEDURE — 84100 ASSAY OF PHOSPHORUS: CPT

## 2017-05-22 PROCEDURE — 80053 COMPREHEN METABOLIC PANEL: CPT

## 2017-05-22 PROCEDURE — 92610 EVALUATE SWALLOWING FUNCTION: CPT

## 2017-05-22 PROCEDURE — 36415 COLL VENOUS BLD VENIPUNCTURE: CPT

## 2017-05-22 RX ORDER — NAPROXEN SODIUM 220 MG/1
81 TABLET, FILM COATED ORAL DAILY
Status: DISCONTINUED | OUTPATIENT
Start: 2017-05-22 | End: 2017-05-22 | Stop reason: HOSPADM

## 2017-05-22 RX ORDER — PANTOPRAZOLE SODIUM 40 MG/10ML
40 INJECTION, POWDER, LYOPHILIZED, FOR SOLUTION INTRAVENOUS DAILY
Status: DISCONTINUED | OUTPATIENT
Start: 2017-05-22 | End: 2017-05-22 | Stop reason: HOSPADM

## 2017-05-22 RX ORDER — ATORVASTATIN CALCIUM 40 MG/1
40 TABLET, FILM COATED ORAL DAILY
Qty: 30 TABLET | Refills: 1 | Status: SHIPPED | OUTPATIENT
Start: 2017-05-22 | End: 2017-05-22

## 2017-05-22 RX ORDER — ATORVASTATIN CALCIUM 40 MG/1
40 TABLET, FILM COATED ORAL DAILY
Qty: 30 TABLET | Refills: 1 | Status: SHIPPED | OUTPATIENT
Start: 2017-05-22 | End: 2017-05-29 | Stop reason: SDUPTHER

## 2017-05-22 RX ADMIN — SODIUM CHLORIDE, PRESERVATIVE FREE 10 ML: 5 INJECTION INTRAVENOUS at 06:05

## 2017-05-22 RX ADMIN — SODIUM CHLORIDE, PRESERVATIVE FREE 3 ML: 5 INJECTION INTRAVENOUS at 01:05

## 2017-05-22 RX ADMIN — HEPARIN SODIUM 5000 UNITS: 5000 INJECTION, SOLUTION INTRAVENOUS; SUBCUTANEOUS at 06:05

## 2017-05-22 RX ADMIN — ASPIRIN 81 MG CHEWABLE TABLET 81 MG: 81 TABLET CHEWABLE at 09:05

## 2017-05-22 RX ADMIN — ATORVASTATIN CALCIUM 20 MG: 20 TABLET, FILM COATED ORAL at 09:05

## 2017-05-22 RX ADMIN — PANTOPRAZOLE SODIUM 40 MG: 40 INJECTION, POWDER, FOR SOLUTION INTRAVENOUS at 09:05

## 2017-05-22 RX ADMIN — HEPARIN SODIUM 5000 UNITS: 5000 INJECTION, SOLUTION INTRAVENOUS; SUBCUTANEOUS at 01:05

## 2017-05-22 NOTE — NURSING
Follow up bladder scan showing only 180 ml urine. Pt denies sensation of bladder fullness/ wishing for follow up scan later for larger urine volume before coude catheter is performed. Coude ordered  And at bedside.

## 2017-05-22 NOTE — PROGRESS NOTES
[facility]  Vascular Neurology  Comprehensive Stroke Center  Progress Note      Assessment/Plan:     76 y/o male with dysphagia, facial droop and dysarthria. HX of afib and not on anticoagulation. W/U in progress as well as medical w/u for other causes    Passed for diet on 5/22/17  Therapy recommending home health     Hypothyroidism due to acquired atrophy of thyroid    -TSH nl  Patients thyroid medication was discontinued by his doctor prior to admission         PAF (paroxysmal atrial fibrillation)    -[id number] 4  risk factor  -Used to be on eliquis, stopped b/c had some confusion on this and multaq  -Consider restarting eliquis, discussed with family and wife. 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 Teams recommending home health     HPI, Past Medical, Family, and Social History remains the same as documented in the initial encounter.     Review of Systems   Constitutional: Negative for fever.   HENT: Positive for trouble swallowing.    Respiratory: Negative for shortness of breath.    Neurological: Negative for weakness.     Scheduled Meds:   aspirin  81 mg Oral Daily    atorvastatin  20 mg Oral Daily    doxazosin  8 mg Oral QHS    heparin (porcine)  5,000 Units Subcutaneous Q8H    levothyroxine  75 mcg Oral Daily    mirtazapine  30 mg Oral Nightly    montelukast  10 mg Oral Daily    pantoprazole  40 mg Intravenous Daily    sodium chloride 0.9%  3 mL Intravenous Q8H     Continuous  Infusions:   sodium chloride 0.9%       PRN Meds:ondansetron, sodium chloride 0.9%    Objective:     Vital Signs (Most Recent):  Temp: 97.4 °F (36.3 °C) (17 1600)  Pulse: (!) 57 (17 1600)  Resp: 20 (17 0853)  BP: 126/69 (17 1600)  SpO2: 96 % (17 1600)  BP Location: Right arm    Vital Signs Range (Last 24H):  Temp:  [97.4 °F (36.3 °C)-98.7 °F (37.1 °C)]   Pulse:  [52-72]   Resp:  [20]   BP: (121-154)/(61-80)   SpO2:  [96 %-98 %]   BP Location: Right arm    Physical Exam   Constitutional: He is oriented to person, place, and time. He appears well-developed and well-nourished.   HENT:   Head: Normocephalic.   Cardiovascular: Normal rate.    Pulmonary/Chest: Effort normal.   Neurological: He is alert and oriented to person, place, and time.   Nursing note and vitals reviewed.  mild horners syndrome    Neurological Exam:   LOC: alert and follows requests  Language: No aphasia  Speech: No dysarthria  Orientation: Person, Place, Time  Motor*: Arm Left:  Paretic:  4/5, Leg Left:   Paretic:  4/5, Arm Right:   Paretic:  4/5, Leg Right:   Paretic:  4/5  Sensation: intact to light touch, temperature and vibration    NIH Stroke Scale:    Level of Consciousness: 0 - alert  LOC Questions: 0 - answers both correctly  LOC Commands: 0 - performs both correctly  Best Gaze: 0 - normal  Visual: 0 - no visual loss  Facial Palsy: 0 - normal  Motor Left Arm: 0 - no drift  Motor Right Arm: 0 - no drift  Motor Left Le - no drift  Motor Right Le - no drift  Limb Ataxia: 0 - absent  Sensory: 0 - normal  Best Language: 0 - no aphasia  Dysarthria: 0 - normal articulation  Extinction and Inattention: 0 - no neglect  NIH Stroke Scale Total: 0      Laboratory:  CMP:   Recent Labs  Lab 17  0308   CALCIUM 8.7   ALBUMIN 3.4*   PROT 5.9*      K 3.6   CO2 22*   *   BUN 15   CREATININE 0.7   ALKPHOS 35*   ALT 14   AST 17   BILITOT 0.9     CBC:   Recent Labs  Lab 17  0308   WBC 10.52   RBC  4.19*   HGB 13.4*   HCT 39.7*      MCV 95   MCH 32.0*   MCHC 33.8     Lipid Panel:   Recent Labs  Lab 05/20/17 2115   CHOL 165   LDLCALC 99.6   HDL 56   TRIG 47     Coagulation:   Recent Labs  Lab 05/22/17  0308   INR 1.0   APTT 25.1     Platelet Aggregation Study: No results for input(s): PLTAGG, PLTAGINTERP, PLTAGREGLACO, ADPPLTAGGREG in the last 168 hours.  Hgb A1C:   Recent Labs  Lab 05/22/17  0308   HGBA1C 5.5     TSH:   Recent Labs  Lab 05/20/17 2115   TSH 1.591       Diagnostic Results:  I have personally reviewed:  5/21/17 MRI   Small area of restricted diffusion/ acute infarct in the base of the right cerebellum, right PICA vascular distribution. No mass effect or hemorrhage. Additional remote lacunar type infarcts noted in this same region.    Decreased signal in the distal right vertebral artery, suggesting hypoplasia or stenosis. The vertebral basilar system is left-sided dominant.    Echo - left atrium normal     1 - Normal left ventricular systolic function (EF 60-65%).     2 - Concentric remodeling.     3 - No wall motion abnormalities.     4 - Normal left ventricular diastolic function.     5 - Right atrial enlargement.     6 - Normal right ventricular systolic function .     7 - Trivial aortic regurgitation.     8 - Trivial tricuspid regurgitation.       JAQUAN Sanches  Comprehensive Stroke Center  Department of Vascular Neurology   [facility]

## 2017-05-22 NOTE — PLAN OF CARE
Problem: Occupational Therapy Goal  Goal: Occupational Therapy Goal  Goals to be met by: 5/28/17     Patient will increase functional independence with ADLs by performing:    UE Dressing with Modified [place].   LE Dressing with Modified [place].  Grooming while standing with Supervision.  Toileting from toilet with Supervision for hygiene and clothing management.   Rolling to Bilateral with Modified [place].   Supine to sit with Modified [place].  Stand pivot transfers with Supervision.  Pt/CG verbalized understanding for s/s of stroke.     Outcome: Ongoing (interventions implemented as appropriate)  Goals remain appropriate. Recommendation changed to  for d/c planning. Recommending shower chair. SAVANNAH Lindsey 5/22/2017

## 2017-05-22 NOTE — PLAN OF CARE
05/22/2017      Taran Crowell  [address] [address]          Vascular Neurology Dept.  [facility] [address] [place] [address] [phone] after hours  [phone] fax Taran Crowell has been hospitalized at the [facility] since 5/20/2017.  And is being discharged on 5/22/17. His wife will be caring for him at home due to his recent medical condition.     Please contact me if you have any questions.                  __________________________  JAQUAN Sanches  05/22/2017

## 2017-05-22 NOTE — SUBJECTIVE & OBJECTIVE
Neurologic Chief Complaint: R PICA stroke     Subjective:     Interval History: Patient is seen for follow-up neurological assessment and treatment recommendations: doing very well, likely dc home today- no events overnight. Teams recommending home health     HPI, Past Medical, Family, and Social History remains the same as documented in the initial encounter.     Review of Systems   Constitutional: Negative for fever.   HENT: Positive for trouble swallowing.    Respiratory: Negative for shortness of breath.    Neurological: Negative for weakness.     Scheduled Meds:   aspirin  81 mg Oral Daily    atorvastatin  20 mg Oral Daily    doxazosin  8 mg Oral QHS    heparin (porcine)  5,000 Units Subcutaneous Q8H    levothyroxine  75 mcg Oral Daily    mirtazapine  30 mg Oral Nightly    montelukast  10 mg Oral Daily    pantoprazole  40 mg Intravenous Daily    sodium chloride 0.9%  3 mL Intravenous Q8H     Continuous Infusions:   sodium chloride 0.9%       PRN Meds:ondansetron, sodium chloride 0.9%    Objective:     Vital Signs (Most Recent):  Temp: 97.4 °F (36.3 °C) (05/22/17 1600)  Pulse: (!) 57 (05/22/17 1600)  Resp: 20 (05/22/17 0853)  BP: 126/69 (05/22/17 1600)  SpO2: 96 % (05/22/17 1600)  BP Location: Right arm    Vital Signs Range (Last 24H):  Temp:  [97.4 °F (36.3 °C)-98.7 °F (37.1 °C)]   Pulse:  [52-72]   Resp:  [20]   BP: (121-154)/(61-80)   SpO2:  [96 %-98 %]   BP Location: Right arm    Physical Exam   Constitutional: He is oriented to person, place, and time. He appears well-developed and well-nourished.   HENT:   Head: Normocephalic.   Cardiovascular: Normal rate.    Pulmonary/Chest: Effort normal.   Neurological: He is alert and oriented to person, place, and time.   Nursing note and vitals reviewed.  mild horners syndrome    Neurological Exam:   LOC: alert and follows requests  Language: No aphasia  Speech: No dysarthria  Orientation: Person, Place, Time  Motor*: Arm Left:  Paretic:  4/5, Leg Left:    Paretic:  4/5, Arm Right:   Paretic:  4/5, Leg Right:   Paretic:  4/5  Sensation: intact to light touch, temperature and vibration    NIH Stroke Scale:    Level of Consciousness: 0 - alert  LOC Questions: 0 - answers both correctly  LOC Commands: 0 - performs both correctly  Best Gaze: 0 - normal  Visual: 0 - no visual loss  Facial Palsy: 0 - normal  Motor Left Arm: 0 - no drift  Motor Right Arm: 0 - no drift  Motor Left Le - no drift  Motor Right Le - no drift  Limb Ataxia: 0 - absent  Sensory: 0 - normal  Best Language: 0 - no aphasia  Dysarthria: 0 - normal articulation  Extinction and Inattention: 0 - no neglect  NIH Stroke Scale Total: 0      Laboratory:  CMP:   Recent Labs  Lab 17  030   CALCIUM 8.7   ALBUMIN 3.4*   PROT 5.9*      K 3.6   CO2 22*   *   BUN 15   CREATININE 0.7   ALKPHOS 35*   ALT 14   AST 17   BILITOT 0.9     CBC:   Recent Labs  Lab 17   WBC 10.52   RBC 4.19*   HGB 13.4*   HCT 39.7*      MCV 95   MCH 32.0*   MCHC 33.8     Lipid Panel:   Recent Labs  Lab 17   CHOL 165   LDLCALC 99.6   HDL 56   TRIG 47     Coagulation:   Recent Labs  Lab 17   INR 1.0   APTT 25.1     Platelet Aggregation Study: No results for input(s): PLTAGG, PLTAGINTERP, PLTAGREGLACO, ADPPLTAGGREG in the last 168 hours.  Hgb A1C:   Recent Labs  Lab 17   HGBA1C 5.5     TSH:   Recent Labs  Lab 17   TSH 1.591       Diagnostic Results:  I have personally reviewed:  17 MRI   Small area of restricted diffusion/ acute infarct in the base of the right cerebellum, right PICA vascular distribution. No mass effect or hemorrhage. Additional remote lacunar type infarcts noted in this same region.    Decreased signal in the distal right vertebral artery, suggesting hypoplasia or stenosis. The vertebral basilar system is left-sided dominant.    Echo - left atrium normal     1 - Normal left ventricular systolic function (EF 60-65%).     2 -  Concentric remodeling.     3 - No wall motion abnormalities.     4 - Normal left ventricular diastolic function.     5 - Right atrial enlargement.     6 - Normal right ventricular systolic function .     7 - Trivial aortic regurgitation.     8 - Trivial tricuspid regurgitation.

## 2017-05-22 NOTE — PT/OT/SLP PROGRESS
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  Pt's spouse present. Pt reported "I'm really feeling much better"    Pain Ratin/10  Pain Rating Post-Intervention: 0/10    Objective:  Patient found with: telemetry, peripheral IV     Functional Mobility:  Bed Mobility:  Rolling/Turning Right: With side rail, Supervision  Scooting/Bridging: Stand by Assistance (to EOB)  Supine to Sit: Supervision, WIth side rail    Transfers:   Sit <> Stand Assistance: Stand By Assistance  Sit <> Stand Assistive Device: No Assistive Device  Bed <> Chair Technique: Stand Pivot  Bed <> Chair Transfer Assistance: Stand By Assistance  Bed <> Chair Assistive Device: No Assistive Device    Functional Ambulation: CGA for household distances    Activities of Daily Living:  Feeding Level of Assistance: Set-up Assistance, Independent (for cup to mouth)  UE Dressing Level of Assistance: Set-up Assistance, Supervision (to doff/don gown; compelted fine motor coordination for closures)  LE Dressing Level of Assistance: Set-up Assistance, Supervision (to doff personal socks and don non-slip socks)  Grooming Position: Standing  Grooming Level of Assistance: Stand by assistance    Additional:  -Pt alert and oriented; agreeable to therapy session  -Completed sit<>stand from bedside chair x5 reps with supervision level assistance  -Pt able to name 5/5 items on tray  -RN and PA to enter for morning rounding  -Communication " board updated; re-edu on call light for assistance at this time    AM-PAC 6 CLICK ADL   How much help from another person does this patient currently need?   1 = Unable, Total/Dependent Assistance  2 = A lot, Maximum/Moderate Assistance  3 = A little, Minimum/Contact Guard/Supervision  4 = None, Modified [place]/Independent    Putting on and taking off regular lower body clothing? : 3  Bathing (including washing, rinsing, drying)?: 3  Toileting, which includes using toilet, bedpan, or urinal? : 3  Putting on and taking off regular upper body clothing?: 3  Taking care of personal grooming such as brushing teeth?: 3  Eating meals?: 3  Total Score: 18     AM-PAC Raw Score CMS G-Code Modifier Level of Impairment Assistance   6 % Total / Unable   7 - 9 CM 80 - 100% Maximal Assist   10 - 14 CL 60 - 80% Moderate Assist   15 - 19 CK 40 - 60% Moderate Assist   20 - 22 CJ 20 - 40% Minimal Assist   23 CI 1-20% SBA / CGA   24 CH 0% Independent/ Mod I     Patient left up in chair with all lines intact, call button in reach and RN and spouse present    ASSESSMENT:  Taran Crowell is a 77 y.o. 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 Pt with increased secretions and able to self control with [place] to suction. He demo good functional mobility and funcional indep with ADLs/self care. Recommendation changes to Home health services at this time to ensure best safety in natural environment.     Rehab identified problem list/impairments: Rehab identified problem list/impairments: impaired endurance, impaired self care skills, gait instability, impaired balance (secretions; dysphagia)    Rehab potential is good.    Activity tolerance: Good    Discharge recommendations: Discharge Facility/Level Of Care Needs: home health OT     Barriers to discharge: Barriers to Discharge: None    Equipment  recommendations: shower chair     GOALS:    Occupational Therapy Goals        Problem: Occupational Therapy Goal    Goal Priority Disciplines Outcome Interventions   Occupational Therapy Goal     OT, PT/OT Ongoing (interventions implemented as appropriate)    Description:  Goals to be met by: 5/28/17     Patient will increase functional independence with ADLs by performing:    UE Dressing with Modified [place].   LE Dressing with Modified [place].  Grooming while standing with Supervision.  Toileting from toilet with Supervision for hygiene and clothing management.   Rolling to Bilateral with Modified [place].   Supine to sit with Modified [place].  Stand pivot transfers with Supervision.  Pt/CG verbalized understanding for s/s of stroke.                       Plan:  Patient to be seen 6 x/week to address the above listed problems via self-care/home management, therapeutic activities, therapeutic exercises, neuromuscular re-education, cognitive retraining, sensory integration  Plan of Care expires: 06/20/17  Plan of Care reviewed with: patient, spouse    SAVANNAH Lindsey  05/22/2017

## 2017-05-22 NOTE — PLAN OF CARE
05/22/17 1038   Discharge Assessment   Assessment Type Discharge Planning Assessment   Confirmed/corrected address and phone number on facesheet? Yes   Assessment information obtained from? Patient   Expected Length of Stay (days) 2   Communicated expected length of stay with patient/caregiver yes   Prior to hospitilization cognitive status: Alert/Oriented   Prior to hospitalization functional status: Independent   Current cognitive status: Alert/Oriented   Current Functional Status: Needs Assistance;Assistive Equipment   Arrived From home or self-care   Lives With spouse   Able to Return to Prior Arrangements yes   Is patient able to care for self after discharge? Yes   How many people do you have in your home that can help with your care after discharge? 1   Readmission Within The Last 30 Days no previous admission in last 30 days   Patient currently being followed by outpatient case management? No   Patient currently receives home health services? No   Does the patient currently use HME? No   Patient currently receives private duty nursing? N/A   Patient currently receives any other outside agency services? No   Equipment Currently Used at Home none   Do you have any problems affording any of your prescribed medications? No   Is the patient taking medications as prescribed? yes   Do you have any financial concerns preventing you from receiving the healthcare you need? No   Does the patient have transportation to healthcare appointments? Yes   Transportation Available car   On Dialysis? No   Does the patient receive services at the Coumadin Clinic? No   Are there any open cases? No   Discharge Plan A Home with family;Home Health  ([facility] is choice)   Discharge Plan B Home with family   Patient/Family In Agreement With Plan yes

## 2017-05-22 NOTE — ASSESSMENT & PLAN NOTE
Antithrombotics: aspirin 81 mg daily- will stop and start apixiban   Statins: Lipitor 20 mg daily - increase to 40 mg daily   Therapy: PT/OT/ST  Diagnostics: none   Hep vte - until apixiban

## 2017-05-22 NOTE — PT/OT/SLP EVAL
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    GERD (gastroesophageal reflux disease)     Hypothyroid     Memory loss last months.     Past Surgical History:   Procedure Laterality Date    COLONOSCOPY  6/23/2000  ([place])    Internal hemorrhoids, otherwise normal exam.    COLONOSCOPY W/ POLYPECTOMY  2/12/2010  [facility]    One less than 1 mm polyp in the distal sigmoid.  TUBULAR ADENOMA.    One less than 1 mm polyp in the cecum.  TUBULAR ADENOMA.    Non-bleeding internal hemorrhoids.       UPPER GASTROINTESTINAL ENDOSCOPY  2/7/2007  (Dr. Bourgeois)    Grade 1 reflux esophagitis.  Small/medium hiatal hernia.  Pylorus erythema.    UPPER GASTROINTESTINAL ENDOSCOPY  2/12/2010  [facility]    Slight reflux esophagitis.  Z-line regular, 30 cm from the incisors.   Moderate / large hiatus hernia.  Normal stomach and duodenum.  SELAM Test  Negative.        Has the patient been evaluated by SLP for swallowing? : Yes  Keep patient NPO?: No   General Precautions: Standard, aspiration, fall, NPO          Social Hx: Patient lives with spouse.    Prior diet: regular/thin.      Subjective:  Awake/alert  " It doesn't feel like I'm drowning anymore when " "I swallow."  " It feels like there's residual cracker in my throat."     Pain Ratin/10  Pain Rating Post-Intervention: 0/10    Objective:        Oral Musculature Evaluation  Oral Musculature: WFL  Dentition: present and adequate  Secretion Management: coughing on secretions, other (see comments) (pt using Yankauer at bedside to manage secretions)  Mucosal Quality: good  Mandibular Strength and Mobility: WFL  Oral Labial Strength and Mobility: WFL  Lingual Strength and Mobility: WFL  Buccal Strength and Mobility: WFL  Volitional Cough: present and productive  Voice Prior to PO Intake: clear  Oral Musculature Comments: JENNA to assess swallow at bedside this am 2/2 nurse hold, pt NPO for testing     Bedside Swallow Eval:  Consistencies Assessed: Thin liquids x6 straw, x10 cup, Puree x3 and Solids x3  Oral Phase: WFL  Pharyngeal Phase: coughing/choking noted with cyclical straw sips and cracker, no overt clinical  signs/symptoms of aspiration, no overt clinical signs/symptoms of pharyngeal dysphagia, multiple spontaneous swallows and throat clearing noted intermittently throughout trials. Pt stated throat clear is habitual    Assessment:  Taran Crowell is a 77 y.o. 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    Do you have any cultural, spiritual, Buddhism conflicts, given your current situation?: none noted     Discharge recommendations: Discharge Facility/Level Of Care Needs: home health speech therapy (pending pt progress and PT/OT recs)     Goals:    SLP Goals        Problem: SLP Goal    Goal Priority Disciplines Outcome   SLP Goal     SLP Ongoing (interventions implemented as appropriate)   Description:  Speech Language Pathology Goals  Goals expected to be met by   1. Pt will participate in swallow assessment NOT MET  2. Continue to assess higher-level problem solving/math skills  3. Pt will complete basic " drawing/tracing/scanning tasks with 90% accuracy, MOD I, to improve visiospatial skills  4. Pt will recall 3/3 un-related items post 5 minute delay, 90% of attempts, MOD I, to improve STM skills  5. Pt will complete fx reading tasks with 100% accuracy, 90% of attempts, MOD I, to improve receptive language and reading comprehension skills  6. Pt will complete repetition tasks at sentence level with 90% intelligibility, MOD I, to improve speech  7. Pt will tolerate dental soft diet/thin liquids without overt s/s of aspiration                                   Plan:   Patient to be seen Therapy Frequency: 5 x/week   Plan of Care expires: 06/20/17  Plan of Care reviewed with: patient, family  SLP Follow-up?: Yes  SLP - Next Visit Date: 05/22/17           Karen Wilson CCC-SLP   Speech Language Pathologist  Pager [phone]  05/22/2017

## 2017-05-22 NOTE — PT/OT/SLP PROGRESS
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  Pt found sitting up in chair with wife present at bedside.   Both agreeable to PT session.       Pain Ratin/10  Pain Rating Post-Intervention: 0/10    Objective:   Patient found with: telemetry    Functional Mobility:  Bed Mobility:  Not assessed; pt found in chair    Transfers:   Sit to stand:Contact Guard Assistance with No Assistive Device from chair. Performed x2 trials; required both hands      Gait:   Patient ambulated ~76 feet with Rolling Walker with Contact Guard Assistance.  Pt demonstrating step-to gait with appropriate weight-shift and foot clearance, no LOB noted or trunk sway this visit. Pt required cueing to slow down with pivot turn, discontinuous steps noted. Pt unable to ambulate further 2/2 increase dizziness and need to return to room.       Balance:  Static Sitting: Supervision or Set-up Assistance   Dynamic Sitting: Supervision or Set-up Assistance   Static Standing: Contact Guard Assistance pt unable to tolerate >30 sec of static standing without increase dizziness and the need to return to sitting; dizziness resolves upon sitting back down.   Dynamic Standing: Contact Guard Assistance     Education:  Education provided to pt regarding: safety with mobility. Verbalized understanding.     Whiteboard updated with correct mobility information. RN/PCT notified.  Pt ok to transfer/walk to bathroom with  RN/PCT. Use RW or 1 person assist.      AM-PAC 6 CLICK MOBILITY  How much help from another person does this patient currently need?   1 = Unable, Total/Dependent Assistance  2 = A lot, Maximum/Moderate Assistance  3 = A little, Minimum/Contact Guard/Supervision  4 = None, Modified [place]/Independent    Turning over in bed (including adjusting bedclothes, sheets and blankets)?: 4  Sitting down on and standing up from a chair with arms (e.g., wheelchair, bedside commode, etc.): 3  Moving from lying on back to sitting on the side of the bed?: 4  Moving to and from a bed to a chair (including a wheelchair)?: 3  Need to walk in hospital room?: 3  Climbing 3-5 steps with a railing?: 3  Total Score: 20    AM-PAC Raw Score CMS G-Code Modifier Level of Impairment Assistance   6 % Total / Unable   7 - 9 CM 80 - 100% Maximal Assist   10 - 14 CL 60 - 80% Moderate Assist   15 - 19 CK 40 - 60% Moderate Assist   20 - 22 CJ 20 - 40% Minimal Assist   23 CI 1-20% SBA / CGA   24 CH 0% Independent/ Mod I     Patient left up in chair with all lines intact and call button in reach.    Assessment:  Taran Crowell is a 77 y.o. 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 Pt tolerated session well and continues to progress towards goals.  Pt remains motivated to get better. Progress towards goals limited/impacted by none.  Pt would benefit from continued skilled physical therapy to address listed impairments, improve functional independence, and maximize safety with mobility.  PT recommends dc disposition to home with HHPT to progress to OP PT if needed for continued balance and mobility training.   .    Rehab identified problem list/impairments: Rehab identified problem list/impairments: impaired endurance, gait instability, impaired  functional mobilty, impaired balance, decreased safety awareness    Rehab potential is good.    Activity tolerance: Good    Discharge recommendations: Discharge Facility/Level Of Care Needs: home health PT     Barriers to discharge: Barriers to Discharge: None    Equipment recommendations: Equipment Needed After Discharge: walker, rolling, shower chair     GOALS:    Physical Therapy Goals        Problem: Physical Therapy Goal    Goal Priority Disciplines Outcome Goal Variances Interventions   Physical Therapy Goal     PT/OT, PT Ongoing (interventions implemented as appropriate)     Description:  Goals to be met by: 17     Patient will increase functional independence with mobility by performin. Supine to sit with Modified [place]  2. Sit to supine with Modified [place]  3. Sit to stand transfer with Modified [place]  4. Bed to chair transfer with Modified [place] using LRAD  5. Gait  x 200 feet with Supervision using LRAD.   6. Lower extremity exercise program x 30 reps per handout, with independence                      PLAN:    Patient to be seen 6 x/week  to address the above listed problems via therapeutic activities, therapeutic exercises, gait training, neuromuscular re-education  Plan of Care expires: 17  Plan of Care reviewed with: patient, spouse         Megan Chester, PT  2017

## 2017-05-22 NOTE — PLAN OF CARE
Problem: Physical Therapy Goal  Goal: Physical Therapy Goal  Goals to be met by: 17     Patient will increase functional independence with mobility by performin. Supine to sit with Modified [place]  2. Sit to supine with Modified [place]  3. Sit to stand transfer with Modified [place]  4. Bed to chair transfer with Modified [place] using LRAD  5. Gait  x 200 feet with Supervision using LRAD.   6. Lower extremity exercise program x 30 reps per handout, with independence           Pt progressing towards goals. All goals remain appropriate at this time.    Megan Chester, PT, DPT  2017

## 2017-05-22 NOTE — CONSULTS
Patient to be discharged today.  RD provided Cardiac Diet Education for patient.  Written materials provided.  RD to follow per protocol.

## 2017-05-22 NOTE — PLAN OF CARE
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    Specialty:  Home Health Services  Why:  Home Health  Contact information:  [address]  [address]  [facility] [address]  [phone]             [facility] On 5/29/2017.    Why:  9:00 am  Contact information:  [address]  [facility] [address]  [phone]             [facility] VASCULAR NEUROLOGY In 4 weeks.    Specialty:  Vascular Neurology  Contact information:  [address]  [facility] [address]  [phone]                   I have seen and examined this patient face to face today. My clinical findings that support the need for the home health skilled services and home bound status are the following:  Weakness/numbness causing balance and gait disturbance due to Stroke making it taxing to leave home.    Allergies:Review of patient's allergies indicates:  No Known Allergies    Diet: dental soft thin liquids     Activities: activity as tolerated. No driving until cleared by OT     Nursing:   SN to complete comprehensive assessment including routine  vital signs. Instruct on disease process and s/s of complications to report to MD. Review/verify medication list sent home with the patient at time of discharge  and instruct patient/caregiver as needed. Frequency may be adjusted depending on start of care date.    Notify MD if SBP > 160 or < 90; DBP > 90 or < 50; HR > 120 or < 50; Temp > 101; Other:   Any acute neurologic deficit       CONSULTS:    Physical Therapy to evaluate and treat. Evaluate for home safety and equipment needs; Establish/upgrade home exercise program. Perform / instruct on therapeutic exercises, gait training, transfer training, and Range of Motion.  Occupational Therapy to evaluate and treat. Evaluate home environment for safety and equipment needs. Perform/Instruct on transfers, ADL training, ROM, and therapeutic exercises.  Speech Therapy  to evaluate and treat for  Language, Swallowing and Cognition.    MISCELLANEOUS CARE:  N/A    WOUND CARE ORDERS  as needed       Medications: Review discharge medications with patient and family and provide education.      Current Discharge Medication List      START taking these medications    Details   apixaban 5 mg Tab Take 1 tablet (5 mg total) by mouth 2 (two) times daily.  Qty: 60 tablet, Refills: 1         CONTINUE these medications which have CHANGED    Details   atorvastatin (LIPITOR) 40 MG tablet Take 1 tablet (40 mg total) by mouth once daily.  Qty: 30 tablet, Refills: 1         CONTINUE these medications which have NOT CHANGED    Details   co-enzyme Q-10 50 mg capsule Take 50 mg by mouth once daily.      doxazosin (CARDURA) 8 MG Tab TAKE 1 TABLET BY MOUTH EVERY EVENING  Qty: 90 tablet, Refills: 11    Comments: **Patient requests 90 days supply**      mirtazapine (REMERON) 30 MG tablet Take 1 tablet (30 mg total) by mouth nightly.  Qty: 30 tablet, Refills: 5      montelukast (SINGULAIR) 10 mg tablet Take 10 mg by mouth once daily.  Refills: 5      omeprazole (PRILOSEC) 40 MG capsule Take 1  capsule (40 mg total) by mouth once daily.  Qty: 30 capsule, Refills: 11      oxybutynin (DITROPAN-XL) 10 MG 24 hr tablet TAKE 1 TABLET EVERY DAY  Qty: 90 tablet, Refills: 3    Associated Diagnoses: OAB (overactive bladder); Urgency incontinence      spironolactone (ALDACTONE) 25 MG tablet TAKE 1 TABLET EVERY DAY  Qty: 90 tablet, Refills: 11         STOP taking these medications       SYNTHROID 75 mcg tablet Comments:   Reason for Stopping:               I certify that this patient is confined to his home and needs physical therapy, speech therapy and occupational therapy.

## 2017-05-22 NOTE — ASSESSMENT & PLAN NOTE
-[id number] 4  risk factor  -Used to be on eliquis, stopped b/c had some confusion on this and multaq  -Consider restarting eliquis, discussed with family and wife. eliquis to restart on discharge

## 2017-05-23 ENCOUNTER — TELEPHONE (OUTPATIENT)
Dept: NEUROSURGERY | Facility: HOSPITAL | Age: 77
End: 2017-05-23

## 2017-05-23 ENCOUNTER — TELEPHONE (OUTPATIENT)
Dept: NEUROLOGY | Facility: CLINIC | Age: 77
End: 2017-05-23

## 2017-05-23 NOTE — PLAN OF CARE
Problem: Occupational Therapy Goal  Goal: Occupational Therapy Goal  Goals to be met by: 5/28/17     Patient will increase functional independence with ADLs by performing:    UE Dressing with Modified [place].   LE Dressing with Modified [place].  Grooming while standing with Supervision.  Toileting from toilet with Supervision for hygiene and clothing management.   Rolling to Bilateral with Modified [place].   Supine to sit with Modified [place].  Stand pivot transfers with Supervision.  Pt/CG verbalized understanding for s/s of stroke.      Outcome: Outcome(s) achieved Date Met: 05/23/17  Pt d/c at this time. Goals not met. SAVANNAH Lindsey 5/23/2017

## 2017-05-23 NOTE — PT/OT/SLP DISCHARGE
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  Please refer to prior OT note dated on 5/22/2017 for functional status.     Assessment:   Patient was discharge unexpectedly.  Information required to complete and accurate discharge summary is unknown.  Refer to therapy initial evaluation and last progress note for initial and most recent functional status and goal achievement.  Recommendations made may be found in medical record.  GOALS:    Occupational Therapy Goals     Not on file          Multidisciplinary Problems (Resolved)        Problem: Occupational Therapy Goal    Goal Priority Disciplines Outcome Interventions   Occupational Therapy Goal   (Resolved)     OT, PT/OT Outcome(s) achieved    Description:  Goals to be met by: 5/28/17     Patient will increase functional independence with ADLs by performing:    UE Dressing with Modified [place].   LE Dressing with Modified [place].  Grooming while standing with Supervision.  Toileting from toilet with Supervision for hygiene and clothing management.   Rolling to Bilateral with Modified [place].   Supine to sit with Modified [place].  Stand pivot transfers with Supervision.  Pt/CG verbalized understanding for s/s of stroke.                     Reasons for Discontinuation of Therapy Services  Transfer to alternate level of care.      Plan:  Patient Discharged to: Home with Home Health Service     SAVANNAH Lindsey 5/23/2017

## 2017-05-23 NOTE — ASSESSMENT & PLAN NOTE
Antithrombotics: aspirin 81 mg daily- will stop and start apixiban   Statins: Lipitor 20 mg daily - increase to 40 mg daily   Therapy: PT/OT/ST home health with DME as recommended   Diagnostics: none   Hep vte - until apixiban

## 2017-05-23 NOTE — ASSESSMENT & PLAN NOTE
-[id number] 4  risk factor  -Used to be on eliquis, stopped b/c had some confusion on this and multaq  -Eliquis to restart on discharge

## 2017-05-23 NOTE — TELEPHONE ENCOUNTER
Attempted to contact patient via number on file.  No answer.  Unable to leave message. Will try again later.

## 2017-05-23 NOTE — ASSESSMENT & PLAN NOTE
-TSH nl  Patients thyroid medication was discontinued by his doctor prior to admission   Will not restart

## 2017-05-23 NOTE — DISCHARGE SUMMARY
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 [I63.449] 05/20/2017 Yes    Hypothyroidism due to acquired atrophy of thyroid [E03.4] 09/30/2015 Yes    PAF (paroxysmal atrial fibrillation) [I48.0] 06/16/2015 Yes      Problems Resolved During this Admission:    Diagnosis Date Noted Date Resolved POA    Facial droop [R29.810] 05/20/2017 05/21/2017 Yes    Dysarthria [R47.1] 05/20/2017 05/21/2017 Yes     Hypothyroidism due to acquired atrophy of thyroid    -TSH nl  Patients thyroid medication was discontinued by his doctor prior to admission   Will not restart        PAF (paroxysmal atrial fibrillation)    -[id number] 4  risk factor  -Used to be on eliquis, stopped b/c had some confusion on this and multaq  -Eliquis to restart on discharge         * 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 providers found     Discharge Provider: JAQUAN Sanches    History of Present Illness: 76 y/o male who has had episodes of dizziness only when he stands off and on for 2 days. Today around 1100 he states that he had difficulty swallowing and has not swallowed anything since. Also left facial droop present and dysarthria, no  sensory deficit or extremity weakness still states dizziness is only upon standing and not with movement of head .   He has afib but not on any anticoagulation. He sees's Dr Herrmann and no documentation as to why no anticoagulation.   Risk factors are afib, TIA, hypothroid    Hospital Course (synopsis of major diagnoses, care, treatment, and services provided during the course of the hospital stay): 78 y/o male with dysphagia, facial droop and dysarthria. HX of afib and not on anticoagulation. W/U in progress as well as medical w/u for other causes    Passed for diet on 17  Therapy recommending home health     Per review of meds with patient and family - patient was instructed to stop thyroid medication by pcp. Will not restart.   Patient requesting suctioning device for home health. Discussed with speech therapy team that has seen patient. Other DME ordered as recommended by teams.   Patient previously on lipitor 20 mg, will increase to 40 mg daily due to LDL > 90    NIH Stroke Scale:  Interval: 7 days or at discharge (whichever comes first)  Level of Consciousness: 0 - alert  LOC Questions: 0 - answers both correctly  LOC Commands: 0 - performs both correctly  Best Gaze: 0 - normal  Visual: 0 - no visual loss  Facial Palsy: 1 - minor  Motor Left Arm: 0 - no drift  Motor Right Arm: 0 - no drift  Motor Left Le - no drift  Motor Right Le - no drift  Limb Ataxia: 0 - absent  Sensory: 0 - normal  Best Language: 0 - no aphasia  Dysarthria: 1 - mild to moderate dysarthria  Extinction and Inattention: 0 - no neglect  NIH Stroke Scale Total: 2  Modified Channing Scale:   Timeline: At discharge  Modified Joey Score: 2 - slight disability        I have personally reviewed:  17 MRI   Small area of restricted diffusion/ acute infarct in the base of the right cerebellum, right PICA vascular distribution. No mass effect or hemorrhage. Additional remote lacunar type infarcts noted in this same  "region.    Decreased signal in the distal right vertebral artery, suggesting hypoplasia or stenosis. The vertebral basilar system is left-sided dominant.     Echo - left atrium normal     1 - Normal left ventricular systolic function (EF 60-65%).     2 - Concentric remodeling.     3 - No wall motion abnormalities.     4 - Normal left ventricular diastolic function.     5 - Right atrial enlargement.     6 - Normal right ventricular systolic function .     7 - Trivial aortic regurgitation.     8 - Trivial tricuspid regurgitation.     Recommendations:     Post-discharge complication risks: Falls    Stroke Education given to: patient and family    Follow-up in Stroke Clinic in 4-6 weeks  [facility]p [facility]     Discharge Plan:  Antithrombotics: none, anticoagulate with apixiban for paroxysmal afib   Statin: Atorvastatin 20mg --> 40 mg   Aggresive risk factor modification: afib    Follow Up:  Follow-up Information     [facility] [place].    Specialty:  Home Health Services  Why:  Home Health  Contact information:  [address]  [address]  [place] [address]  [phone]             [facility] On 5/29/2017.    Why:  9:00 am  Contact information:  [address]  [facility] [address]  [phone]             [facility] VASCULAR NEUROLOGY In 4 weeks.    Specialty:  Vascular Neurology  Contact information:  [address]  [facility] [id number] [phone]               Patient Instructions:     WALKER FOR HOME USE   Order Comments: Pt will d/c to home today.  Please deliver to room along with shower chair.  Call Tri [phone] with questions.   Order Specific Question Answer Comments   Type of Walker: Adult (5'4"-6'6")    With wheels? Yes    Height: 5' 6" (1.676 m)    Weight: 89.8 kg (198 lb)    Length of need (1-99 months): 99    Does patient have medical equipment at home? none    Please check all that apply: Patient is unable to safely ambulate without equipment.  " "  Vendor: Other (use comments) [employer]   Expected Date of Delivery: 5/22/2017      BATH/SHOWER CHAIR FOR HOME USE   Order Specific Question Answer Comments   Height: 5' 6" (1.676 m)    Weight: 89.8 kg (198 lb)    Does patient have medical equipment at home? none    Length of need (1-99 months): 99    Type: With back    Vendor: Other (use comments) [employer]   Expected Date of Delivery: 5/22/2017      SUCTION MACHINE FOR HOME USE   Order Comments: Please deliver to pt's home.   Order Specific Question Answer Comments   Height: 5' 6" (1.676 m)    Weight: 89.8 kg (198 lb)    Type of suction: Intermittent    Frequency: Other (please specify) prn   Disposable cannisters? Yes    Connective tubing? Yes    [place]? Yes    Length of need (1-99 months): 99    Does patient have medical equipment at home? none    Vendor: Other (use comments) [employer]    Expected Date of Delivery: 5/22/2017      Call 911 for any of the following:   Order Comments: Call 911  right away if any of the following warning signs come on suddenly, even if the symptoms only last for a few minutes. With stroke, timing is very important.   - Warning Signs of Stroke:  - Weakness: You may feel a sudden weakness, tingling or loss of feeling on one side of your face or body.  - Vision Problems: You may have sudden double vision or trouble seeing in one or both eyes.  - Speech Problems: You may have sudden trouble talking, slured speech, or problems understanding others.  - Headache: You may have sudden, severe headache.  - Movement Problems: You may experience dizziness, a feeling of spinning, a loss of balance, a feeling of falling or blackouts.       Medications:  Reconciled Home Medications:   Discharge Medication List as of 5/22/2017  4:05 PM      START taking these medications    Details   apixaban 5 mg Tab Take 1 tablet (5 mg total) by mouth 2 (two) times daily., Starting Mon 5/22/2017, Normal         CONTINUE these medications " which have CHANGED    Details   atorvastatin (LIPITOR) 40 MG tablet Take 1 tablet (40 mg total) by mouth once daily., Starting Mon 5/22/2017, Until Tue 5/22/2018, Normal         CONTINUE these medications which have NOT CHANGED    Details   co-enzyme Q-10 50 mg capsule Take 50 mg by mouth once daily., Until Discontinued, Historical Med      doxazosin (CARDURA) 8 MG Tab TAKE 1 TABLET BY MOUTH EVERY EVENING, Normal      mirtazapine (REMERON) 30 MG tablet Take 1 tablet (30 mg total) by mouth nightly., Starting 3/8/2016, Until Discontinued, Normal      montelukast (SINGULAIR) 10 mg tablet Take 10 mg by mouth once daily., Starting 11/17/2015, Until Discontinued, Historical Med      omeprazole (PRILOSEC) 40 MG capsule Take 1 capsule (40 mg total) by mouth once daily., Starting 4/12/2017, Until Discontinued, Normal      oxybutynin (DITROPAN-XL) 10 MG 24 hr tablet TAKE 1 TABLET EVERY DAY, Normal      spironolactone (ALDACTONE) 25 MG tablet TAKE 1 TABLET EVERY DAY, Normal         STOP taking these medications       SYNTHROID 75 mcg tablet Comments:   Reason for Stopping:               JAQUAN Sanches  Comprehensive Stroke Center  Department of Vascular Neurology   [facility]

## 2017-05-23 NOTE — TELEPHONE ENCOUNTER
----- Message from Segundo Ridley sent at 5/23/2017  2:04 PM CDT -----  Contact: Suzanne with [facility] [phone]  Caller is requesting a return phone call, she doing an evulation on patient and need to discuss diagnosis and f/u appt. pls call

## 2017-05-23 NOTE — SUBJECTIVE & OBJECTIVE
NIH Stroke Scale:  Interval: 7 days or at discharge (whichever comes first)  Level of Consciousness: 0 - alert  LOC Questions: 0 - answers both correctly  LOC Commands: 0 - performs both correctly  Best Gaze: 0 - normal  Visual: 0 - no visual loss  Facial Palsy: 1 - minor  Motor Left Arm: 0 - no drift  Motor Right Arm: 0 - no drift  Motor Left Le - no drift  Motor Right Le - no drift  Limb Ataxia: 0 - absent  Sensory: 0 - normal  Best Language: 0 - no aphasia  Dysarthria: 1 - mild to moderate dysarthria  Extinction and Inattention: 0 - no neglect  NIH Stroke Scale Total: 2  Modified Joey Scale:   Timeline: At discharge  Modified Lancaster Score: 2 - slight disability

## 2017-05-24 ENCOUNTER — TELEPHONE (OUTPATIENT)
Dept: NEUROSURGERY | Facility: HOSPITAL | Age: 77
End: 2017-05-24

## 2017-05-24 NOTE — TELEPHONE ENCOUNTER
Called number on file.  Spoke with patient's wife Linh Crowell who is primary caregiver.  Risk factors specific to patient for stroke discussed with teach back implemented.  Patient's wife verbalized understanding of discharge instructions and medications.  Patient's wife was asked about discharge appointments and follow up care.  No follow appointment scheduled thus far.  Message sent to [facility] on patient's behalf to schedule follow up appointment. Warning sings discussed with teach back discussion method implemented.  Notified to seek immediate medical help via 911 if new or worsening stroke symptoms occur.  Patient and wife relayed no new questions or comments at this time.  Instructed to call [facility] with any further questions.

## 2017-05-25 ENCOUNTER — TELEPHONE (OUTPATIENT)
Dept: NEUROLOGY | Facility: CLINIC | Age: 77
End: 2017-05-25

## 2017-05-25 NOTE — TELEPHONE ENCOUNTER
----- Message from Josh Lopez RN sent at 5/24/2017  3:03 PM CDT -----  Hey Mrs. Doe,    I was wondering if we could get Mr. Crowell a follow up appointment scheduled in 4 weeks with a stroke provider.  He would like one sooner since he is complaining on dizziness so if we could get him in in 2 weeks or sooner that would be okay.    Thanks for all you do,  RUTHY QuispeN-RN

## 2017-05-25 NOTE — TELEPHONE ENCOUNTER
Krystina Palacios (therapist) w/ [facility]'s call-     She states that Mr. Crowell's only residual issue after stroke is a problem with his blood pressure. She states that his B.P. May drop 20 points while doing therapy and he has to take breaks in between. She calling to find out if there are any recommendations for this patient, because it's very difficult to continue and she's out of ideas.     Appt booked 06/08/2017 w/Suzanne Banerjee informed me that she would notify the pt.

## 2017-05-25 NOTE — TELEPHONE ENCOUNTER
----- Message from Noelle Perez sent at 5/24/2017  8:22 AM CDT -----  Contact: Tena    [facility]    [phone]  Returning Brook's call.

## 2017-05-25 NOTE — PT/OT/SLP DISCHARGE
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  Please refer to prior PT noted date on 17 for functional status.     Assessment:   Patient appropriate for care in another setting.       GOALS:    Physical Therapy Goals        Problem: Physical Therapy Goal    Goal Priority Disciplines Outcome Goal Variances Interventions   Physical Therapy Goal     PT/OT, PT Ongoing (interventions implemented as appropriate)     Description:  Goals to be met by: 17     Patient will increase functional independence with mobility by performin. Supine to sit with Modified Corinne  2. Sit to supine with Modified Corinne  3. Sit to stand transfer with Modified Corinne  4. Bed to chair transfer with Modified Corinne using LRAD  5. Gait  x 200 feet with Supervision using LRAD.   6. Lower extremity exercise program x 30 reps per handout, with independence                    Reasons for Discontinuation of Therapy Services  Transfer to alternate level of care.      Plan:  Patient Discharged to: Home with Home Health Service.      Megan Chester, PT  2017

## 2017-05-29 ENCOUNTER — OFFICE VISIT (OUTPATIENT)
Dept: PRIMARY CARE CLINIC | Facility: CLINIC | Age: 77
End: 2017-05-29
Payer: MEDICARE

## 2017-05-29 VITALS
OXYGEN SATURATION: 95 % | HEIGHT: 67 IN | HEART RATE: 65 BPM | DIASTOLIC BLOOD PRESSURE: 60 MMHG | WEIGHT: 187.63 LBS | SYSTOLIC BLOOD PRESSURE: 102 MMHG | BODY MASS INDEX: 29.45 KG/M2

## 2017-05-29 DIAGNOSIS — J30.1 SEASONAL ALLERGIC RHINITIS DUE TO POLLEN: ICD-10-CM

## 2017-05-29 DIAGNOSIS — I63.449 CEREBRAL INFARCTION DUE TO EMBOLISM OF UNSPECIFIED CEREBELLAR ARTERY: ICD-10-CM

## 2017-05-29 DIAGNOSIS — Z79.01 CHRONIC ANTICOAGULATION: Chronic | ICD-10-CM

## 2017-05-29 DIAGNOSIS — K21.9 GASTROESOPHAGEAL REFLUX DISEASE WITHOUT ESOPHAGITIS: Chronic | ICD-10-CM

## 2017-05-29 DIAGNOSIS — G25.0 ESSENTIAL TREMOR: Chronic | ICD-10-CM

## 2017-05-29 DIAGNOSIS — I48.0 PAF (PAROXYSMAL ATRIAL FIBRILLATION): Primary | ICD-10-CM

## 2017-05-29 DIAGNOSIS — N40.0 BENIGN NODULAR PROSTATIC HYPERPLASIA, PRESENCE OF LOWER URINARY TRACT SYMPTOMS UNSPECIFIED: ICD-10-CM

## 2017-05-29 DIAGNOSIS — E78.2 MIXED HYPERLIPIDEMIA: Chronic | ICD-10-CM

## 2017-05-29 PROCEDURE — 99496 TRANSJ CARE MGMT HIGH F2F 7D: CPT | Mod: PBBFAC

## 2017-05-29 PROCEDURE — 99999 PR PBB SHADOW E&M-EST. PATIENT-LVL III: CPT | Mod: PBBFAC,,,

## 2017-05-29 PROCEDURE — 99213 OFFICE O/P EST LOW 20 MIN: CPT | Mod: PBBFAC

## 2017-05-29 RX ORDER — FINASTERIDE 5 MG/1
5 TABLET, FILM COATED ORAL DAILY
Qty: 30 TABLET | Refills: 11 | Status: SHIPPED | OUTPATIENT
Start: 2017-05-29 | End: 2017-06-05 | Stop reason: SDUPTHER

## 2017-05-29 RX ORDER — OMEPRAZOLE 40 MG/1
40 CAPSULE, DELAYED RELEASE ORAL DAILY
Qty: 90 CAPSULE | Refills: 4 | Status: SHIPPED | OUTPATIENT
Start: 2017-05-29 | End: 2018-09-06 | Stop reason: SDUPTHER

## 2017-05-29 RX ORDER — TAMSULOSIN HYDROCHLORIDE 0.4 MG/1
0.4 CAPSULE ORAL NIGHTLY
Qty: 90 CAPSULE | Refills: 4 | Status: SHIPPED | OUTPATIENT
Start: 2017-05-29 | End: 2018-07-16 | Stop reason: SDUPTHER

## 2017-05-29 RX ORDER — MIRTAZAPINE 30 MG/1
30 TABLET, FILM COATED ORAL NIGHTLY
Qty: 90 TABLET | Refills: 4 | Status: SHIPPED | OUTPATIENT
Start: 2017-05-29 | End: 2018-07-09 | Stop reason: SDUPTHER

## 2017-05-29 RX ORDER — FLUTICASONE PROPIONATE 50 MCG
2 SPRAY, SUSPENSION (ML) NASAL DAILY
Qty: 3 BOTTLE | Refills: 4 | Status: SHIPPED | OUTPATIENT
Start: 2017-05-29 | End: 2019-01-01 | Stop reason: SDUPTHER

## 2017-05-29 RX ORDER — ATORVASTATIN CALCIUM 40 MG/1
40 TABLET, FILM COATED ORAL DAILY
Qty: 90 TABLET | Refills: 4 | Status: SHIPPED | OUTPATIENT
Start: 2017-05-29 | End: 2018-09-25 | Stop reason: SDUPTHER

## 2017-05-29 NOTE — PROGRESS NOTES
[facility]  New Visit Progress Note   Recent Hospital Discharge     PRESENTING HISTORY     Chief Complaint/Reason for Visit:  Follow up Hospital Discharge   Chief Complaint   Patient presents with    Hospital Follow Up     PCP: Gerry Cast MD    History of Present Illness: Mr. Taran Crowell is a 77 y.o. male who was recently admitted to the hospital.    Admit Date: 5/20/2017  8:56 PM  Discharge Date and Time: 5/22/2017  4:44 PM  Discharge Provider: JAQUAN Sanches     History of Present Illness: 78 y/o male who has had episodes of dizziness only when he stands off and on for 2 days. Today around 1100 he states that he had difficulty swallowing and has not swallowed anything since. Also left facial droop present and dysarthria, no sensory deficit or extremity weakness still states dizziness is only upon standing and not with movement of head .   He has afib but not on any anticoagulation. He sees's Dr Herrmann and no documentation as to why no anticoagulation.   Risk factors are afib, TIA, hypothroid     Hospital Course (synopsis of major diagnoses, care, treatment, and services provided during the course of the hospital stay): 78 y/o male with dysphagia, facial droop and dysarthria. HX of afib and not on anticoagulation. W/U in progress as well as medical w/u for other causes     Passed for diet on 5/22/17  Therapy recommending home health      Per review of meds with patient and family - patient was instructed to stop thyroid medication by pcp. Will not restart.   Patient requesting suctioning device for home health. Discussed with speech therapy team that has seen patient. Other DME ordered as recommended by teams.   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 No mass effect or hemorrhage. Additional remote lacunar type infarcts noted in this same region.    Decreased signal in the distal right vertebral artery, suggesting hypoplasia or stenosis. The vertebral basilar system is left-sided dominant.     Echo - left atrium normal     1 - Normal left ventricular systolic function (EF 60-65%).     2 - Concentric remodeling.     3 - No wall motion abnormalities.     4 - Normal left ventricular diastolic function.     5 - Right atrial enlargement.     6 - Normal right ventricular systolic function .     7 - Trivial aortic regurgitation.     8 - Trivial tricuspid regurgitation.      Recommendations:      Post-discharge complication risks: Falls     Stroke Education given to: patient and family     Follow-up in Stroke Clinic in 4-6 weeks  [facility]     ___________________________________________________________________    Today:  He complains of dizziness post discharge.  Worse with position and walking. No numbness and weakness.  No mental confusion.  Normal urination.    No fever or chills.  No chest pain.    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 No mass effect or hemorrhage. Additional remote lacunar type infarcts noted in this same region. Decreased signal in the distal right vertebral artery, suggesting hypoplasia or stenosis. The vertebral basilar system is left-sided dominant.    Chronic anticoagulation - on apixaban 2017    Essential tremor 2017    On Remeron    GERD (gastroesophageal reflux disease)     History of colon polyps 3/18/2014    Hypothyroidism due to acquired atrophy of thyroid 2015    Last on .  None since then.    Memory loss last months.    Mixed hyperlipidemia 2017    PAF (paroxysmal atrial fibrillation) 2015       Past Surgical History:   Procedure Laterality Date    [place]OSCOPY  2000  ([place])    Internal hemorrhoids, otherwise normal exam.    COLONOSCOPY W/ POLYPECTOMY  2010  [place]    One less than 1 mm polyp in the distal sigmoid.  TUBULAR ADENOMA.    One less than 1 mm polyp in the cecum.  TUBULAR ADENOMA.    Non-bleeding internal hemorrhoids.       UPPER GASTROINTESTINAL ENDOSCOPY  2007  (Dr. Bourgeois)    Grade 1 reflux esophagitis.  Small/medium hiatal hernia.  Pylorus erythema.    UPPER GASTROINTESTINAL ENDOSCOPY  2010  [place]    Slight reflux esophagitis.  Z-line regular, 30 cm from the incisors.   Moderate / large hiatus hernia.  Normal stomach and duodenum.  SELAM Test  Negative.        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 60 tablet 1    atorvastatin (LIPITOR) 40 MG tablet Take 1 tablet (40 mg total) by mouth once daily. 30 tablet 1    co-enzyme Q-10 50 mg capsule Take 50 mg by mouth once daily.      doxazosin (CARDURA) 8 MG Tab TAKE 1 TABLET BY MOUTH EVERY EVENING 90 tablet 11     mirtazapine (REMERON) 30 MG tablet Take 1 tablet (30 mg total) by mouth nightly. 30 tablet 5    montelukast (SINGULAIR) 10 mg tablet Take 10 mg by mouth once daily.  5     omeprazole (PRILOSEC) 40 MG capsule Take 1 capsule (40 mg total) by mouth once daily. 30 capsule 11    oxybutynin (DITROPAN-XL) 10 MG 24 hr tablet TAKE 1 TABLET EVERY DAY 90 tablet 3     spironolactone (ALDACTONE) 25 MG tablet TAKE 1 TABLET EVERY DAY 90 tablet 11       Review of patient's allergies indicates:  No Known Allergies    OBJECTIVE:     Vital Signs:  Vitals:    05/29/17 0848   BP: 102/60   Pulse: 65     Wt Readings from Last 1 Encounters:   05/29/17 0848 85.1 kg (187 lb 9.8 oz)     Body mass index is 29.38 kg/m².     Physical Exam:  General: Well developed, well nourished. No distress.  HEENT: Head is normocephalic, atraumatic   Eyes: Clear conjunctiva.  Neck: Supple, symmetrical neck; trachea midline.  Lungs: Clear to auscultation bilaterally and normal respiratory effort.  Cardiovascular: Heart with regular rate and rhythm.    Extremities: No LE edema.    Abdomen: Abdomen is soft, non-tender non-distended with normal bowel sounds.  Skin: Skin color, texture, turgor normal. No rashes.  Musculoskeletal: Normal gait.   Psychiatric: Not depressed.    Laboratory  Lab Results   Component Value Date    WBC 10.52  05/22/2017    HGB 13.4 (L) 05/22/2017    HCT 39.7 (L) 05/22/2017    MCV 95 05/22/2017     05/22/2017     BMP  Lab Results   Component Value Date     05/22/2017    K 3.6 05/22/2017     (H) 05/22/2017    CO2 22 (L) 05/22/2017    BUN 15 05/22/2017    CREATININE 0.7 05/22/2017    CALCIUM 8.7 05/22/2017    ANIONGAP 9 05/22/2017    ESTGFRAFRICA >60.0 05/22/2017    EGFRNONAA >60.0 05/22/2017     Lab Results   Component Value Date    ALT 14 05/22/2017    AST 17 05/22/2017    ALKPHOS 35 (L) 05/22/2017    BILITOT 0.9 05/22/2017     Lab Results   Component Value Date    INR 1.0 05/22/2017    INR 1.0 05/20/2017     Lab Results   Component Value Date    HGBA1C 5.5 05/22/2017       TRANSITION OF CARE:     [facility] On Call Contact Note: 5-24-17    Family and/or Caretaker present at visit?  Yes.  Diagnostic tests reviewed/disposition: No diagnosic tests pending after this hospitalization.  Disease/illness education: Stroke, atrial fibrillation, BPH  Home health/community services discussion/referrals: Patient has home health established at [facility].   Establishment or re-establishment of referral orders for community resources: No other necessary community resources.   Discussion with other health care providers: No discussion with other health care providers necessary.     Medications Reconciliation:   I have reconciled the patient's home medications and discharge medications with the patient/family. I have updated all changes.  Refer to After-Visit Medication List.    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    Rate controlled. Discussed importance of chronic anticoagulation with apixaban to prevent stroke.    - Dizziness currently due to orthostatic hypotension from Cardura. Will change to Flomax.    Rx:  -     apixaban 5 mg  Tab; Take 1 tablet (5 mg total) by mouth 2 (two) times daily.  Dispense: 180 tablet; Refill: 4  -     DIPH,PERTUSS(ACEL),TET VAC(PF)(ADULT)(ADACEL)(TDaP); Inject 0.5 mLs into the muscle one time.    Essential tremor  Refilled:  -     mirtazapine (REMERON) 30 MG tablet; Take 1 tablet (30 mg total) by mouth nightly.  Dispense: 90 tablet; Refill: 4    Mixed hyperlipidemia  Refilled:  -     atorvastatin (LIPITOR) 40 MG tablet; Take 1 tablet (40 mg total) by mouth once daily.  Dispense: 90 tablet; Refill: 4    Seasonal allergic rhinitis due to pollen  - Stop montelukast and use Flonase as preventive measure.  -     fluticasone (FLONASE) 50 mcg/actuation nasal spray; 2 sprays by Each Nare route once daily.  Dispense: 3 Bottle; Refill: 4    Gastroesophageal reflux disease without esophagitis  Refilled:  -     omeprazole (PRILOSEC) 40 MG capsule; Take 1 capsule (40 mg total) by mouth once daily.  Dispense: 90 capsule; Refill: 4    Benign nodular prostatic hyperplasia, presence of lower urinary tract symptoms unspecified  - Stop Cardura and Ditropan.    Start Flomax with Proscar.    Rx:  -     tamsulosin (FLOMAX) 0.4 mg Cp24; Take 1 capsule (0.4 mg total) by mouth every evening.  Dispense: 90 capsule; Refill: 4  -     finasteride (PROSCAR) 5 mg tablet; Take 1 tablet (5 mg total) by mouth once daily.  Dispense: 30 tablet; Refill: 11    Instructions for the patient:  Until you get your Flomax (tamsulosin), take Cardura but at reduced dose of 1/2 tablet daily.    Scheduled Follow-up :  Future Appointments  Date Time Provider Department Center   6/8/2017 2:00 PM Ty Christy MD [facility] STR[facility]   7/17/2017 8:20 AM Kemi Lopez MD [facility] IM [facility]       After Visit Medication List :     Medication List          Accurate as of 5/29/17  9:21 AM. Always use your most recent med list.               apixaban 5 mg Tab  Take 1 tablet (5 mg total) by mouth 2 (two) times daily.     atorvastatin 40 MG tablet  Commonly known  as:  LIPITOR  Take 1 tablet (40 mg total) by mouth once daily.     finasteride 5 mg tablet  Commonly known as:  PROSCAR  Take 1 tablet (5 mg total) by mouth once daily.     fluticasone 50 mcg/actuation nasal spray  Commonly known as:  FLONASE  2 sprays by Each Nare route once daily.     mirtazapine 30 MG tablet  Commonly known as:  REMERON  Take 1 tablet (30 mg total) by mouth nightly.     omeprazole 40 MG capsule  Commonly known as:  PRILOSEC  Take 1 capsule (40 mg total) by mouth once daily.     tamsulosin 0.4 mg Cp24  Commonly known as:  FLOMAX  Take 1 capsule (0.4 mg total) by mouth every evening.           Where to Get Your Medications      These medications were sent to [facility] - [place], [place] - [address]  [address], [facility] [address]    Phone:  [phone]    apixaban 5 mg Tab   atorvastatin 40 MG tablet   finasteride 5 mg tablet   fluticasone 50 mcg/actuation nasal spray   mirtazapine 30 MG tablet   omeprazole 40 MG capsule   tamsulosin 0.4 mg Cp24           Signing Physician:  Juan Pablo Espino MD

## 2017-06-05 DIAGNOSIS — N40.0 BENIGN NODULAR PROSTATIC HYPERPLASIA, PRESENCE OF LOWER URINARY TRACT SYMPTOMS UNSPECIFIED: ICD-10-CM

## 2017-06-05 DIAGNOSIS — E78.2 MIXED HYPERLIPIDEMIA: Chronic | ICD-10-CM

## 2017-06-05 RX ORDER — FINASTERIDE 5 MG/1
5 TABLET, FILM COATED ORAL DAILY
Qty: 30 TABLET | Refills: 11 | Status: SHIPPED | OUTPATIENT
Start: 2017-06-05 | End: 2018-08-20 | Stop reason: SDUPTHER

## 2017-06-05 RX ORDER — ATORVASTATIN CALCIUM 40 MG/1
40 TABLET, FILM COATED ORAL DAILY
Qty: 90 TABLET | Refills: 4 | Status: CANCELLED | OUTPATIENT
Start: 2017-06-05

## 2017-06-07 ENCOUNTER — TELEPHONE (OUTPATIENT)
Dept: INTERNAL MEDICINE | Facility: CLINIC | Age: 77
End: 2017-06-07

## 2017-06-07 NOTE — TELEPHONE ENCOUNTER
Spoke with home health nurse Ayleen and advised that Dr. Espino recommends the pt to take 1/2 tab cardura until Flomax arrives by mail.   If SBP <100, do not take Cardura. Nurse agrees with Dr. Espino's recommendation.

## 2017-06-07 NOTE — TELEPHONE ENCOUNTER
Home health nurse Allison called stating that Mr. Crowell was instructed by Dr. Espino to take 1/2 tab of Cardura (which is a med that the pt had prior to the visit) if the pt experienced urinary retention. Pt experienced urinary retention and took Cardura. Home health nurse states the pt has experienced dizziness and  BP readings in the 70-80s/50s range. Nurse would like a call to be advised.

## 2017-06-08 ENCOUNTER — OFFICE VISIT (OUTPATIENT)
Dept: NEUROLOGY | Facility: CLINIC | Age: 77
End: 2017-06-08
Payer: MEDICARE

## 2017-06-08 VITALS
BODY MASS INDEX: 29.35 KG/M2 | HEIGHT: 67 IN | WEIGHT: 187 LBS | DIASTOLIC BLOOD PRESSURE: 74 MMHG | SYSTOLIC BLOOD PRESSURE: 121 MMHG | HEART RATE: 67 BPM

## 2017-06-08 DIAGNOSIS — E78.2 MIXED HYPERLIPIDEMIA: Chronic | ICD-10-CM

## 2017-06-08 DIAGNOSIS — G25.0 ESSENTIAL TREMOR: Chronic | ICD-10-CM

## 2017-06-08 DIAGNOSIS — Z79.01 CHRONIC ANTICOAGULATION: Chronic | ICD-10-CM

## 2017-06-08 DIAGNOSIS — R06.83 SNORING: ICD-10-CM

## 2017-06-08 DIAGNOSIS — I48.0 PAF (PAROXYSMAL ATRIAL FIBRILLATION): ICD-10-CM

## 2017-06-08 DIAGNOSIS — Z86.73 HISTORY OF STROKE: Primary | ICD-10-CM

## 2017-06-08 PROCEDURE — 1126F AMNT PAIN NOTED NONE PRSNT: CPT | Mod: ,,, | Performed by: PSYCHIATRY & NEUROLOGY

## 2017-06-08 PROCEDURE — 1159F MED LIST DOCD IN RCRD: CPT | Mod: ,,, | Performed by: PSYCHIATRY & NEUROLOGY

## 2017-06-08 PROCEDURE — 99215 OFFICE O/P EST HI 40 MIN: CPT | Mod: S$PBB,,, | Performed by: PSYCHIATRY & NEUROLOGY

## 2017-06-08 PROCEDURE — 99213 OFFICE O/P EST LOW 20 MIN: CPT | Mod: PBBFAC | Performed by: PSYCHIATRY & NEUROLOGY

## 2017-06-08 PROCEDURE — 99999 PR PBB SHADOW E&M-EST. PATIENT-LVL III: CPT | Mod: PBBFAC,,, | Performed by: PSYCHIATRY & NEUROLOGY

## 2017-06-08 NOTE — PROGRESS NOTES
[facility]    Impression:  1. R PICA infarct with excellent recovery.  Likely small embolus from PAF (vs atherothromboembolism from R VA origin)  · Stroke risk factors: stroke, PAF, dyslipidemia, r/o LUNA  2. Essential tremor    Plan:  1. Continue Eliquis  2. Continue atorvastatin 40mg/d; Repeat LFTs and FLP per Dr. Cast; target LDL <70mg/dL  3. Continue Remeron for tremor  4. Sleep disorders eval to r/o LUNA  5. RTC 5 months or sooner, prn      Problem List Items Addressed This Visit        1 - High    History of stroke - Primary    Overview     R PICA May 2017            2     Essential tremor (Chronic)    Overview     On Remeron            3     Chronic anticoagulation - on apixaban (Chronic)    Mixed hyperlipidemia (Chronic)    PAF (paroxysmal atrial fibrillation)      Other Visit Diagnoses     Snoring        Relevant Orders    Ambulatory consult to Sleep Disorders          CC: stroke hospital f/u    HPI:  76 y/o WM here for f/u of a R PICA infarct 5/20/17.  I saw him while he was hospitalized at [facility].  He presents with his wife who provides additional history.  Etiology felt to be PAF (vs intrinsic R VA disease as R VA origin was not well seen on MRA).    He had been on Eliquis in the past but stopped it and Multaq due to confusion.  Eliquis was restarted prior to discharge.    Atorvastatin was increased from 20 to 40mg daily (LDL was 99.6).  He is tolerating well. Rare choking with bread/crackers only.  No recurrent stroke symptoms.  He is participating in PT/OT/ST. [facility] at [facility].   + snoring/gasping. No depression.    Has had some episodes of low BP but none since stopping Cardura    Mild tremor controlled with Remeron; INGRID scan negative; eval per Dr. Ventura.    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 No mass effect or hemorrhage. Additional remote lacunar type infarcts noted in this same region. Decreased signal in the distal right vertebral artery, suggesting hypoplasia or stenosis. The vertebral basilar system is left-sided dominant.    Chronic anticoagulation - on apixaban 5/29/2017    Essential tremor 5/29/2017    On Remeron    GERD (gastroesophageal reflux disease)     History of colon polyps 3/18/2014    Hypothyroidism due to acquired atrophy of thyroid 09/30/2015    Last on 2015.  None since then.    Memory loss last months.    Mixed hyperlipidemia 5/29/2017    PAF (paroxysmal atrial fibrillation) 6/16/2015      Past Surgical History:   Procedure Laterality Date    COLONOSCOPY  6/23/2000  ([place])    Internal hemorrhoids, otherwise normal exam.    COLONOSCOPY W/ POLYPECTOMY  2/12/2010  [place]    One less than 1 mm polyp in the distal sigmoid.  TUBULAR ADENOMA.    One less than 1 mm polyp in the cecum.  TUBULAR ADENOMA.    Non-bleeding internal hemorrhoids.       UPPER GASTROINTESTINAL ENDOSCOPY  2/7/2007  (Dr. Bourgeois)    Grade 1 reflux esophagitis.  Small/medium hiatal hernia.  Pylorus erythema.    UPPER GASTROINTESTINAL ENDOSCOPY  2/12/2010  [place]    Slight reflux esophagitis.  Z-line regular, 30 cm from the incisors.   Moderate / large hiatus hernia.  Normal stomach and duodenum.  SELAM Test  Negative.       Outpatient Prescriptions Marked as Taking for the 6/8/17 encounter (Office Visit) with Ty Christy MD   Medication Sig Dispense Refill    apixaban 5 mg Tab Take 1 tablet (5 mg total) by mouth 2 (two) times daily. 180 tablet 4    atorvastatin (LIPITOR) 40 MG tablet Take 1 tablet (40 mg total) by mouth once daily. 90 tablet 4    fluticasone (FLONASE) 50 mcg/actuation nasal spray 2 sprays by Each Nare route once daily. 3 Bottle 4    mirtazapine (REMERON) 30 MG tablet Take 1 tablet (30 mg total)  "by mouth nightly. 90 tablet 4    omeprazole (PRILOSEC) 40 MG capsule Take 1 capsule (40 mg total) by mouth once daily. 90 capsule 4    tamsulosin (FLOMAX) 0.4 mg Cp24 Take 1 capsule (0.4 mg total) by mouth every evening. 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     Social History Main Topics    Smoking status: Never Smoker    Smokeless tobacco: Former User    Alcohol use No    Drug use: No    Sexual activity: Not on file     Other Topics Concern    Not on file     Social History Narrative        3 children    Retired-     Review Of Systems:  General: Negative for fever   HENT: Negative for tinnitus, nose bleeds, neck stiffness   Cardiac Negative for palpitations   Vascular: Negative for easy bruising   Pulmonary: Negative for cough   Gastrointestinal: Negative for constipation   Urinary: Negative for incomplete bladder emptying   Musculoskeletal: Negative for muscle aches   Neurological: As above. Otherwise negative for abnormal movements   Psychiatric:  Negative for depression       /74 (BP Location: Left arm, Patient Position: Sitting, BP Method: Automatic)   Pulse 67   Ht 5' 7" (1.702 m)   Wt 84.8 kg (187 lb)   BMI 29.29 kg/m²    Well developed, well nourished male  Extremities: no edema  Mild R Derrick's    Mental status:   Awake, alert and appropriately oriented   Normal recent and remote memory   Normal attention and concentration   Normal speech and language   Normal fund of knowledge   No extinction  Cranial nerves:   EOMF without nystagmus   VFF   Normal facial " sensation   Normal facial movements; mild R ptosis (Derrick's)  Motor:   No pronator drift   Normal FF movements bilaterally   Normal muscle tone, bulk and power   Mild postural tremor BUEs  Sensory   Intact to LT  DTRs   NT  Coordination   Intact to FNF and HTS  Gait   Normal base and gait    NIHSS = 0  mRS = 1    Data Reviewed:  MRI brain  MRA neck    Lab Results   Component Value Date    LDLCALC 99.6 05/20/2017         Ty Christy MD

## 2017-06-14 ENCOUNTER — TELEPHONE (OUTPATIENT)
Dept: ADMINISTRATIVE | Facility: CLINIC | Age: 77
End: 2017-06-14

## 2017-06-15 ENCOUNTER — TELEPHONE (OUTPATIENT)
Dept: PRIMARY CARE CLINIC | Facility: CLINIC | Age: 77
End: 2017-06-15

## 2017-06-15 DIAGNOSIS — R53.81 DEBILITY: Primary | ICD-10-CM

## 2017-06-20 PROCEDURE — 99496 TRANSJ CARE MGMT HIGH F2F 7D: CPT | Mod: S$PBB,,, | Performed by: INTERNAL MEDICINE

## 2017-07-17 ENCOUNTER — OFFICE VISIT (OUTPATIENT)
Dept: SLEEP MEDICINE | Facility: CLINIC | Age: 77
End: 2017-07-17
Attending: PSYCHIATRY & NEUROLOGY
Payer: MEDICARE

## 2017-07-17 VITALS
HEIGHT: 67 IN | OXYGEN SATURATION: 96 % | BODY MASS INDEX: 30.1 KG/M2 | SYSTOLIC BLOOD PRESSURE: 118 MMHG | HEART RATE: 74 BPM | WEIGHT: 191.81 LBS | DIASTOLIC BLOOD PRESSURE: 78 MMHG

## 2017-07-17 DIAGNOSIS — G47.30 SLEEP APNEA, UNSPECIFIED: Primary | ICD-10-CM

## 2017-07-17 PROCEDURE — 1159F MED LIST DOCD IN RCRD: CPT | Mod: ,,, | Performed by: NURSE PRACTITIONER

## 2017-07-17 PROCEDURE — 1126F AMNT PAIN NOTED NONE PRSNT: CPT | Mod: ,,, | Performed by: NURSE PRACTITIONER

## 2017-07-17 PROCEDURE — 99214 OFFICE O/P EST MOD 30 MIN: CPT | Mod: S$PBB,,, | Performed by: NURSE PRACTITIONER

## 2017-07-17 PROCEDURE — 99999 PR PBB SHADOW E&M-EST. PATIENT-LVL III: CPT | Mod: PBBFAC,,, | Performed by: NURSE PRACTITIONER

## 2017-07-17 PROCEDURE — 99213 OFFICE O/P EST LOW 20 MIN: CPT | Mod: PBBFAC | Performed by: NURSE PRACTITIONER
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
## 2017-07-17 NOTE — LETTER
July 17, 2017      Ty Christy MD  [address]  [facility] [address]           [facility] [place], operated by [facility]  [address]  [facility] [address]  Phone: [phone]          Patient: Taran Crowell   MR Number: [id number]   YOB: 1940   Date of Visit: 7/17/2017       Dear Dr. Ty Christy:    Thank you for referring Taran Crowell to me for evaluation. Attached you will find relevant portions of my assessment and plan of care.    If you have questions, please do not hesitate to call me. I look forward to following Taran Crowell along with you.    Sincerely,    Demi Mcpherson, LINDSAY    Enclosure  CC:  No Recipients    If you would like to receive this communication electronically, please contact [email or web address] or [phone] to request more information on [employer] Link access.    For providers and/or their staff who would like to refer a patient to [facility], please contact us through our one-stop-shop provider referral line, [facility], at [phone].    If you feel you have received this communication in error or would no longer like to receive these types of communications, please e-mail [email or web address]

## 2017-07-17 NOTE — PATIENT INSTRUCTIONS
Carolyn or Sachin will contact you to schedule your sleep study. Their number is [phone] (ext 2). The [facility] is located on 7th floor of the [facility].    We will call you when the sleep study results are ready - if you have not heard from us by 2 weeks from the date of the study, please call [phone] (ext 1).    You are advised to abstain from driving should you feel sleepy or drowsy.

## 2017-07-20 ENCOUNTER — TELEPHONE (OUTPATIENT)
Dept: SLEEP MEDICINE | Facility: OTHER | Age: 77
End: 2017-07-20

## 2017-07-27 ENCOUNTER — TELEPHONE (OUTPATIENT)
Dept: SLEEP MEDICINE | Facility: OTHER | Age: 77
End: 2017-07-27

## 2017-08-02 ENCOUNTER — HOSPITAL ENCOUNTER (OUTPATIENT)
Dept: SLEEP MEDICINE | Facility: OTHER | Age: 77
Discharge: HOME | End: 2017-08-02
Attending: NURSE PRACTITIONER
Payer: MEDICARE

## 2017-08-02 DIAGNOSIS — G47.30 SLEEP APNEA, UNSPECIFIED: ICD-10-CM

## 2017-08-02 DIAGNOSIS — G47.33 OSA (OBSTRUCTIVE SLEEP APNEA): ICD-10-CM

## 2017-08-02 PROCEDURE — 95811 POLYSOM 6/>YRS CPAP 4/> PARM: CPT

## 2017-08-02 PROCEDURE — 95811 POLYSOM 6/>YRS CPAP 4/> PARM: CPT | Mod: 26,,, | Performed by: PSYCHIATRY & NEUROLOGY

## 2017-08-03 NOTE — PROGRESS NOTES
Baseline PSG study was performed on Taran Crowell. Procedure was explained and questions were answered prior to the study.    EKG:  Occasional PAC    Difficulties:  Pt did not wasnt to continue with titration stating that the mask is very uncomfortable for him and that he can't fall asleep with the pressure on his face.      Pt met criteria for CPAP.      Mask type and size:  Humidity:   3  CFlex:      3  Pressure range explored: CPAP: [id number]/ Bilevel: 12/8 to 14/10 cmH20  Optimal pressure achieved in Supine REM:  none      Pt response to CPAP:  Pt quit CPAP stating that he will not be able to fall asleep with the mask on and that it feels very uncomfortable.

## 2017-08-08 ENCOUNTER — TELEPHONE (OUTPATIENT)
Dept: INTERNAL MEDICINE | Facility: CLINIC | Age: 77
End: 2017-08-08

## 2017-08-08 NOTE — TELEPHONE ENCOUNTER
Spoke to pt's wife Linh, pt being seen tomorrow for back and hip pain. Pt has an est care appt 10/25 with

## 2017-08-08 NOTE — TELEPHONE ENCOUNTER
----- Message from Pati Long sent at 8/8/2017  2:42 PM CDT -----  Contact: [employer] request  Message     Appointment Request From: Taran Crowell    With Provider: Kemi Lopez MD [-Primary Care Physician-]    Would Accept With:Only the person I've selected    Preferred Date Range: From 8/7/2017 To 8/31/2017    Preferred Times: Any    Reason for visit: Request an Appt    Comments:  back and hip pain

## 2017-08-09 ENCOUNTER — OFFICE VISIT (OUTPATIENT)
Dept: INTERNAL MEDICINE | Facility: CLINIC | Age: 77
End: 2017-08-09
Payer: MEDICARE

## 2017-08-09 ENCOUNTER — HOSPITAL ENCOUNTER (OUTPATIENT)
Dept: RADIOLOGY | Facility: HOSPITAL | Age: 77
Discharge: HOME | End: 2017-08-09
Attending: NURSE PRACTITIONER
Payer: MEDICARE

## 2017-08-09 VITALS
OXYGEN SATURATION: 97 % | WEIGHT: 199.31 LBS | DIASTOLIC BLOOD PRESSURE: 76 MMHG | HEART RATE: 63 BPM | SYSTOLIC BLOOD PRESSURE: 118 MMHG | HEIGHT: 67 IN | BODY MASS INDEX: 31.28 KG/M2

## 2017-08-09 DIAGNOSIS — M25.551 RIGHT HIP PAIN: ICD-10-CM

## 2017-08-09 DIAGNOSIS — M54.50 ACUTE RIGHT-SIDED LOW BACK PAIN WITHOUT SCIATICA: ICD-10-CM

## 2017-08-09 DIAGNOSIS — M46.1 SACROILIITIS: Primary | ICD-10-CM

## 2017-08-09 DIAGNOSIS — M46.1 SACROILIITIS: ICD-10-CM

## 2017-08-09 PROCEDURE — 1159F MED LIST DOCD IN RCRD: CPT | Mod: ,,, | Performed by: NURSE PRACTITIONER

## 2017-08-09 PROCEDURE — 3008F BODY MASS INDEX DOCD: CPT | Mod: ,,, | Performed by: NURSE PRACTITIONER

## 2017-08-09 PROCEDURE — 99999 PR PBB SHADOW E&M-EST. PATIENT-LVL IV: CPT | Mod: PBBFAC,,, | Performed by: NURSE PRACTITIONER

## 2017-08-09 PROCEDURE — 99213 OFFICE O/P EST LOW 20 MIN: CPT | Mod: S$PBB,,, | Performed by: NURSE PRACTITIONER

## 2017-08-09 PROCEDURE — 1125F AMNT PAIN NOTED PAIN PRSNT: CPT | Mod: ,,, | Performed by: NURSE PRACTITIONER

## 2017-08-09 PROCEDURE — 72202 X-RAY EXAM SI JOINTS 3/> VWS: CPT | Mod: 26,,, | Performed by: RADIOLOGY

## 2017-08-09 PROCEDURE — 72202 X-RAY EXAM SI JOINTS 3/> VWS: CPT | Mod: TC

## 2017-08-09 RX ORDER — BETAMETHASONE SODIUM PHOSPHATE AND BETAMETHASONE ACETATE 3; 3 MG/ML; MG/ML
6 INJECTION, SUSPENSION INTRA-ARTICULAR; INTRALESIONAL; INTRAMUSCULAR; SOFT TISSUE
Status: COMPLETED | OUTPATIENT
Start: 2017-08-09 | End: 2017-08-09

## 2017-08-09 RX ORDER — MELOXICAM 7.5 MG/1
7.5 TABLET ORAL DAILY
Qty: 20 TABLET | Refills: 0 | Status: SHIPPED | OUTPATIENT
Start: 2017-08-09 | End: 2017-08-29

## 2017-08-09 RX ORDER — MELOXICAM 7.5 MG/1
TABLET ORAL
Qty: 90 TABLET | Refills: 0 | OUTPATIENT
Start: 2017-08-09

## 2017-08-09 RX ADMIN — BETAMETHASONE SODIUM PHOSPHATE AND BETAMETHASONE ACETATE 6 MG: 3; 3 INJECTION, SUSPENSION INTRA-ARTICULAR; INTRALESIONAL; INTRAMUSCULAR at 10:08

## 2017-08-09 NOTE — PROGRESS NOTES
INTERNAL MEDICINE PROGRESS/URGENT CARE NOTE    CHIEF COMPLAINT     Chief Complaint   Patient presents with    Low-back Pain     R. side, couple months     Hip Pain     R. side; more painful when sleeping        HPI     Taran Crowell is a 77 y.o. male with BPH, CVA (embolic- 5/21/17), tremor, Hypothyroidism, HLD, LUNA, PAF, and GERD who presents for an urgent visit today.  He is a family medicine patient who plans of establishing with Dr. Lopez.     Pt presents to the clinic with c/o right lower back pain and hip pain  x 2 months. Worse when sleeping on that side, right. Also with morning stiffness. Denies injury and trauma. Although spouse reports falls in past 2015. Denies numbness and tingling. No radiation.   Treating with aleve as needed.   Has h/o lower back pain, was seen by sport medicine was given shot of ?cortisone with relief.     CVA embolic with PAF- compliant with apixaban.     BPH- compliant with proscar and flomax.     HLD- compliant with lipitor. LDL 5/20/17 at goal.     Tremor- Compliant with remeron.     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 No mass effect or hemorrhage. Additional remote lacunar type infarcts noted in this same region. Decreased signal in the distal right vertebral artery, suggesting hypoplasia or stenosis. The vertebral basilar system is left-sided dominant.    Chronic anticoagulation - on apixaban 5/29/2017    Essential tremor 5/29/2017    On Remeron    GERD (gastroesophageal reflux disease)     History of colon polyps 3/18/2014    Hypothyroidism due to acquired atrophy of thyroid 09/30/2015    Last on 2015.  None since then.    Memory loss last months.    Mixed hyperlipidemia  5/29/2017    PAF (paroxysmal atrial fibrillation) 6/16/2015       Home Medications:  Prior to Admission medications    Medication Sig Start Date End Date Taking? Authorizing Provider   apixaban 5 mg Tab Take 1 tablet (5 mg total) by mouth 2 (two) times daily. 5/29/17   Juan Pablo Espino MD   atorvastatin (LIPITOR) 40 MG tablet Take 1 tablet (40 mg total) by mouth once daily. 5/29/17   Juan Pablo Espino MD   finasteride (PROSCAR) 5 mg tablet Take 1 tablet (5 mg total) by mouth once daily. 6/5/17 6/5/18  Juan Pablo Espino MD   fluticasone (FLONASE) 50 mcg/actuation nasal spray 2 sprays by Each Nare route once daily. 5/29/17   Juan Pablo Espino MD   mirtazapine (REMERON) 30 MG tablet Take 1 tablet (30 mg total) by mouth nightly. 5/29/17   Juan Pablo Espino MD   omeprazole (PRILOSEC) 40 MG capsule Take 1 capsule (40 mg total) by mouth once daily. 5/29/17   Juan Pablo Espino MD   tamsulosin (FLOMAX) 0.4 mg Cp24 Take 1 capsule (0.4 mg total) by mouth every evening. 5/29/17   Juan Pablo Espino MD       Review of Systems:  Review of Systems   Constitutional: Negative for chills, diaphoresis and fatigue.   Respiratory: Negative for cough, shortness of breath and wheezing.    Cardiovascular: Negative for chest pain and palpitations.   Gastrointestinal: Negative for abdominal pain, constipation, diarrhea, nausea and vomiting.        No reflux, take prilosec    Genitourinary: Positive for difficulty urinating. Negative for frequency and urgency.   Musculoskeletal: Positive for arthralgias and back pain. Negative for myalgias, neck pain and neck stiffness.   Skin: Negative for rash.   Neurological: Negative for dizziness, light-headedness and headaches.       Health Maintainence:   Immunizations:  Health Maintenance       Date Due Completion Date    Pneumococcal (65+) (1 of 2 - PCV13) 02/01/2005 ---    Influenza Vaccine 08/01/2017 10/1/2016    Lipid Panel 05/20/2022 5/20/2017    TETANUS VACCINE 05/29/2027 5/29/2017    Override on 9/1/2012: Done            PHYSICAL EXAM     There were no vitals taken for this visit.    Physical Exam   Constitutional: He is oriented to person, place, and time. He appears well-developed and well-nourished.   HENT:   Head: Normocephalic.   Right Ear: External ear normal.   Left Ear: External ear normal.   Nose: Nose normal.   Mouth/Throat: Oropharynx is clear and moist. No oropharyngeal exudate.   Eyes: Pupils are equal, round, and reactive to light.   Neck: No JVD present. No tracheal deviation present. No thyromegaly present.   Cardiovascular: Normal rate, regular rhythm and intact distal pulses.  Exam reveals no gallop and no friction rub.    No murmur heard.  Pulmonary/Chest: Breath sounds normal. No respiratory distress. He has no wheezes. He has no rales. He exhibits no tenderness.   Abdominal: Soft. Bowel sounds are normal. He exhibits no distension. There is no tenderness.   Musculoskeletal: Normal range of motion. He exhibits no edema or tenderness.        Lumbar back: He exhibits pain (with REBEKAH test ). He exhibits normal range of motion, no tenderness, no bony tenderness, no swelling, no edema, no deformity and normal pulse.        Back:    Lymphadenopathy:     He has no cervical adenopathy.   Neurological: He is alert and oriented to person, place, and time.   Skin: Skin is warm and dry. No rash noted.   Psychiatric: He has a normal mood and affect. His behavior is normal.   Vitals reviewed.      LABS     Lab Results   Component Value Date    HGBA1C 5.5 05/22/2017     CMP  Sodium   Date Value Ref Range Status   05/22/2017 142 136 - 145 mmol/L Final     Potassium   Date Value Ref Range Status   05/22/2017 3.6 3.5 - 5.1 mmol/L Final     Chloride   Date Value Ref Range Status   05/22/2017 111 (H) 95 - 110 mmol/L Final     CO2   Date Value Ref Range Status   05/22/2017 22 (L) 23 - 29 mmol/L Final     Glucose   Date Value Ref Range Status   05/22/2017 78 70 - 110 mg/dL Final     BUN, Bld   Date Value Ref Range Status    05/22/2017 15 8 - 23 mg/dL Final     Creatinine   Date Value Ref Range Status   05/22/2017 0.7 0.5 - 1.4 mg/dL Final     Calcium   Date Value Ref Range Status   05/22/2017 8.7 8.7 - 10.5 mg/dL Final     Total Protein   Date Value Ref Range Status   05/22/2017 5.9 (L) 6.0 - 8.4 g/dL Final     Albumin   Date Value Ref Range Status   05/22/2017 3.4 (L) 3.5 - 5.2 g/dL Final     Total Bilirubin   Date Value Ref Range Status   05/22/2017 0.9 0.1 - 1.0 mg/dL Final     Comment:     For infants and newborns, interpretation of results should be based  on gestational age, weight and in agreement with clinical  observations.  Premature Infant recommended reference ranges:  Up to 24 hours.............<8.0 mg/dL  Up to 48 hours............<12.0 mg/dL  3-5 days..................<15.0 mg/dL  6-29 days.................<15.0 mg/dL       Alkaline Phosphatase   Date Value Ref Range Status   05/22/2017 35 (L) 55 - 135 U/L Final     AST   Date Value Ref Range Status   05/22/2017 17 10 - 40 U/L Final     ALT   Date Value Ref Range Status   05/22/2017 14 10 - 44 U/L Final     Anion Gap   Date Value Ref Range Status   05/22/2017 9 8 - 16 mmol/L Final     eGFR if    Date Value Ref Range Status   05/22/2017 >60.0 >60 mL/min/1.73 m^2 Final     eGFR if non    Date Value Ref Range Status   05/22/2017 >60.0 >60 mL/min/1.73 m^2 Final     Comment:     Calculation used to obtain the estimated glomerular filtration  rate (eGFR) is the CKD-EPI equation. Since race is unknown   in our information system, the eGFR values for   -American and Non--American patients are given   for each creatinine result.       Lab Results   Component Value Date    WBC 10.52 05/22/2017    HGB 13.4 (L) 05/22/2017    HCT 39.7 (L) 05/22/2017    MCV 95 05/22/2017     05/22/2017     Lab Results   Component Value Date    CHOL 165 05/20/2017    CHOL 233 (H) 03/28/2016    CHOL 251 (H) 12/09/2014     Lab Results   Component  Value Date    HDL 56 05/20/2017    HDL 77 (H) 03/28/2016     (H) 12/09/2014     Lab Results   Component Value Date    LDLCALC 99.6 05/20/2017    LDLCALC 143.0 03/28/2016    LDLCALC 120.8 12/09/2014     Lab Results   Component Value Date    TRIG 47 05/20/2017    TRIG 65 03/28/2016    TRIG 46 12/09/2014     Lab Results   Component Value Date    CHOLHDL 33.9 05/20/2017    CHOLHDL 33.0 03/28/2016    CHOLHDL 48.2 12/09/2014     Lab Results   Component Value Date    TSH 1.591 05/20/2017       ASSESSMENT/PLAN     Taran Crowell is a 77 y.o. 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 No mass effect or hemorrhage. Additional remote lacunar type infarcts noted in this same region. Decreased signal in the distal right vertebral artery, suggesting hypoplasia or stenosis. The vertebral basilar system is left-sided dominant.    Chronic anticoagulation - on apixaban 5/29/2017    Essential tremor 5/29/2017    On Remeron    GERD (gastroesophageal reflux disease)     History of colon polyps 3/18/2014    Hypothyroidism due to acquired atrophy of thyroid 09/30/2015    Last on 2015.  None since then.    Memory loss last months.    Mixed hyperlipidemia 5/29/2017    PAF (paroxysmal atrial fibrillation) 6/16/2015       Sacroiliitis- will send for images today. May use meloxicam asneeded daily. If no improvement in 2 weeks will send to back and spine center.   -     meloxicam (MOBIC) 7.5 MG tablet; Take 1 tablet (7.5 mg total) by mouth once daily.  Dispense: 20 tablet; Refill: 0  -     betamethasone acetate-betamethasone sodium phosphate injection 6 mg; Inject 1 mL (6 mg total) into the muscle one time.    Acute right-sided low back pain without  sciatica-   -     X-Ray Sacroiliac Joints Complete; Future; Expected date: 08/09/2017    Right hip pain  -     X-Ray Sacroiliac Joints Complete; Future; Expected date: 08/09/2017        Follow up with PCP in 2 months for f/u as scheduled     Patient education provided from Rebecca. Patient was counseled on when and how to seek emergent care.       Gayatri NICOLE, JAY JAY, FNP-c   Department of Internal Medicine - [facility]  9:59 AM

## 2017-08-09 NOTE — PROGRESS NOTES
Two pt identifiers verified (name & ). Pt tolerated well.  Pt advised to remain in clinic for 15 minutes and report any difficulties.

## 2017-08-17 ENCOUNTER — TELEPHONE (OUTPATIENT)
Dept: SLEEP MEDICINE | Facility: CLINIC | Age: 77
End: 2017-08-17

## 2017-08-17 NOTE — TELEPHONE ENCOUNTER
Please notify pt that 08/02/2017 in-lab sleep study results available. Schedule for f/u to discuss results.

## 2017-08-28 DIAGNOSIS — M46.1 SACROILIITIS: ICD-10-CM

## 2017-08-28 RX ORDER — MELOXICAM 7.5 MG/1
7.5 TABLET ORAL DAILY
Qty: 20 TABLET | Refills: 0 | Status: CANCELLED | OUTPATIENT
Start: 2017-08-28 | End: 2017-09-17

## 2017-08-30 ENCOUNTER — OFFICE VISIT (OUTPATIENT)
Dept: SLEEP MEDICINE | Facility: CLINIC | Age: 77
End: 2017-08-30
Payer: MEDICARE

## 2017-08-30 VITALS
SYSTOLIC BLOOD PRESSURE: 122 MMHG | OXYGEN SATURATION: 96 % | HEART RATE: 67 BPM | WEIGHT: 195.56 LBS | DIASTOLIC BLOOD PRESSURE: 78 MMHG | BODY MASS INDEX: 30.69 KG/M2 | HEIGHT: 67 IN

## 2017-08-30 DIAGNOSIS — E66.9 OBESITY (BMI 30.0-34.9): ICD-10-CM

## 2017-08-30 DIAGNOSIS — G47.33 OBSTRUCTIVE SLEEP APNEA: Primary | ICD-10-CM

## 2017-08-30 PROCEDURE — 1159F MED LIST DOCD IN RCRD: CPT | Mod: ,,, | Performed by: NURSE PRACTITIONER

## 2017-08-30 PROCEDURE — 99214 OFFICE O/P EST MOD 30 MIN: CPT | Mod: S$PBB,,, | Performed by: NURSE PRACTITIONER

## 2017-08-30 PROCEDURE — 99999 PR PBB SHADOW E&M-EST. PATIENT-LVL III: CPT | Mod: PBBFAC,,, | Performed by: NURSE PRACTITIONER

## 2017-08-30 PROCEDURE — 99213 OFFICE O/P EST LOW 20 MIN: CPT | Mod: PBBFAC | Performed by: NURSE PRACTITIONER

## 2017-08-30 PROCEDURE — 1126F AMNT PAIN NOTED NONE PRSNT: CPT | Mod: ,,, | Performed by: NURSE PRACTITIONER
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
## 2017-08-31 ENCOUNTER — PATIENT MESSAGE (OUTPATIENT)
Dept: INTERNAL MEDICINE | Facility: CLINIC | Age: 77
End: 2017-08-31

## 2017-08-31 DIAGNOSIS — M54.5 ACUTE LOW BACK PAIN, UNSPECIFIED BACK PAIN LATERALITY, WITH SCIATICA PRESENCE UNSPECIFIED: Primary | ICD-10-CM

## 2017-08-31 RX ORDER — MELOXICAM 7.5 MG/1
7.5 TABLET ORAL DAILY
Qty: 20 TABLET | Refills: 0 | Status: SHIPPED | OUTPATIENT
Start: 2017-08-31 | End: 2017-09-15 | Stop reason: SDUPTHER

## 2017-09-06 ENCOUNTER — TELEPHONE (OUTPATIENT)
Dept: SLEEP MEDICINE | Facility: CLINIC | Age: 77
End: 2017-09-06

## 2017-09-06 NOTE — TELEPHONE ENCOUNTER
Received pt order FOR CPAP SYSTEM confirmation from "[employer].                SENT TO Scan

## 2017-09-06 NOTE — TELEPHONE ENCOUNTER
Received pt order FOR CPAP SYSTEM confirmation from [facility].     SENT TO BE FAXED

## 2017-09-15 DIAGNOSIS — M54.5 ACUTE LOW BACK PAIN, UNSPECIFIED BACK PAIN LATERALITY, WITH SCIATICA PRESENCE UNSPECIFIED: ICD-10-CM

## 2017-09-15 RX ORDER — MELOXICAM 7.5 MG/1
7.5 TABLET ORAL DAILY
Qty: 20 TABLET | Refills: 0 | Status: SHIPPED | OUTPATIENT
Start: 2017-09-15 | End: 2017-10-04 | Stop reason: SDUPTHER

## 2017-10-03 ENCOUNTER — TELEPHONE (OUTPATIENT)
Dept: INTERNAL MEDICINE | Facility: CLINIC | Age: 77
End: 2017-10-03

## 2017-10-03 NOTE — TELEPHONE ENCOUNTER
----- Message from Sil Rodney sent at 10/3/2017  5:40 PM CDT -----  Contact: Natasha Walton evening,    Appointment Request From: Taran Crowell    With Provider: Kemi Lopez MD [-Primary Care Physician-]    Would Accept With:Only the person I've selected    Preferred Date Range: From 10/3/2017 To 10/6/2017    Preferred Times: Any    Reason for visit: Request an Appt    Comments:  My , Taran, does have an appointment with Dr. Lopez on October 20.  However, I have been out of town for 2 weeks, returning last night. During my absence, Taran has fallen at least twice, and he has had at least 2 or 3 choking episodes.  We think he needs to be seen by someone, Either Dr. Lopez, her Nurse Practicioner, or his neurologist Dr. Christy, prior to the appointment on Oct 20 with Dr. Lopez.  Please advise.  Thank you.  Linh Crowell: (cell [phone])  or Taran Crowell: (cell [phone])

## 2017-10-04 ENCOUNTER — OFFICE VISIT (OUTPATIENT)
Dept: INTERNAL MEDICINE | Facility: CLINIC | Age: 77
End: 2017-10-04
Payer: MEDICARE

## 2017-10-04 VITALS
HEIGHT: 67 IN | BODY MASS INDEX: 31.49 KG/M2 | HEART RATE: 59 BPM | SYSTOLIC BLOOD PRESSURE: 114 MMHG | OXYGEN SATURATION: 96 % | DIASTOLIC BLOOD PRESSURE: 82 MMHG | WEIGHT: 200.63 LBS

## 2017-10-04 DIAGNOSIS — I63.449 CEREBRAL INFARCTION DUE TO EMBOLISM OF CEREBELLAR ARTERY, UNSPECIFIED BLOOD VESSEL LATERALITY: ICD-10-CM

## 2017-10-04 DIAGNOSIS — Z79.01 CHRONIC ANTICOAGULATION: Chronic | ICD-10-CM

## 2017-10-04 DIAGNOSIS — R29.6 FREQUENT FALLS: Primary | ICD-10-CM

## 2017-10-04 DIAGNOSIS — R13.10 DYSPHAGIA, UNSPECIFIED TYPE: ICD-10-CM

## 2017-10-04 DIAGNOSIS — G47.33 OSA (OBSTRUCTIVE SLEEP APNEA): ICD-10-CM

## 2017-10-04 DIAGNOSIS — M54.5 ACUTE LOW BACK PAIN, UNSPECIFIED BACK PAIN LATERALITY, WITH SCIATICA PRESENCE UNSPECIFIED: ICD-10-CM

## 2017-10-04 DIAGNOSIS — I48.0 PAF (PAROXYSMAL ATRIAL FIBRILLATION): ICD-10-CM

## 2017-10-04 DIAGNOSIS — G25.0 ESSENTIAL TREMOR: Chronic | ICD-10-CM

## 2017-10-04 DIAGNOSIS — Z86.73 HISTORY OF STROKE: ICD-10-CM

## 2017-10-04 PROCEDURE — 99215 OFFICE O/P EST HI 40 MIN: CPT | Mod: PBBFAC | Performed by: NURSE PRACTITIONER

## 2017-10-04 PROCEDURE — 99214 OFFICE O/P EST MOD 30 MIN: CPT | Mod: S$PBB,,, | Performed by: NURSE PRACTITIONER

## 2017-10-04 PROCEDURE — 99999 PR PBB SHADOW E&M-EST. PATIENT-LVL V: CPT | Mod: PBBFAC,,, | Performed by: NURSE PRACTITIONER

## 2017-10-04 RX ORDER — MELOXICAM 7.5 MG/1
TABLET ORAL
Qty: 20 TABLET | Refills: 0 | Status: SHIPPED | OUTPATIENT
Start: 2017-10-04 | End: 2017-10-04 | Stop reason: CLARIF

## 2017-10-04 NOTE — PROGRESS NOTES
"INTERNAL MEDICINE PROGRESS NOTE    CHIEF COMPLAINT     Chief Complaint   Patient presents with    Fall     2 falls within 2 weeks     Fatigue    Oral Swelling     throat "chokes up only when eating"       HPI     Taran Crowell is a 77 y.o. male who presents for an urgent visit today.  He is an established pt of Dr. Lopez.     CVA- 5-21-17 after stroke had dysphagia which resovled with speech and OT. Was at baseline until 2 weeks ago. Wife went out of townbut when she returned noted 2 falls in the past 2 week. 9/29/2017 fall in dining room at [facility]. Denies hitting head, fell down and was helped up. Pt doesn't remember anything about the fall, possibly could have blacked out.  Pt reports leg weakness that causes the fall.  Overall feeling weak in his legs and .  Denies lightheadedness, dizziness.    Also with concerns of choking 5-7 in the past 2-3 months. One requiring Heimlich maneuvers Occurs when eating breads or crackers.     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 No mass effect or hemorrhage. Additional remote lacunar type infarcts noted in this same region. Decreased signal in the distal right vertebral artery, suggesting hypoplasia or stenosis. The vertebral basilar system is left-sided dominant.    Chronic anticoagulation - on apixaban 5/29/2017    Essential tremor 5/29/2017    On Remeron    GERD (gastroesophageal reflux disease)     History of colon polyps 3/18/2014    Hypothyroidism due to acquired atrophy of thyroid 09/30/2015    Last on 2015.  None since then.    Memory loss last months.    Mixed hyperlipidemia 5/29/2017    " PAF (paroxysmal atrial fibrillation) 6/16/2015       Home Medications:  Prior to Admission medications    Medication Sig Start Date End Date Taking? Authorizing Provider   apixaban 5 mg Tab Take 1 tablet (5 mg total) by mouth 2 (two) times daily. 5/29/17  Yes Juan Pablo Espino MD   atorvastatin (LIPITOR) 40 MG tablet Take 1 tablet (40 mg total) by mouth once daily. 5/29/17  Yes Juan Pablo Espino MD   finasteride (PROSCAR) 5 mg tablet Take 1 tablet (5 mg total) by mouth once daily. 6/5/17 6/5/18 Yes Juan Pablo Espino MD   fluticasone (FLONASE) 50 mcg/actuation nasal spray 2 sprays by Each Nare route once daily. 5/29/17  Yes Juan Pablo Espino MD   meloxicam (MOBIC) 7.5 MG tablet TAKE 1 TABLET EVERY DAY 10/4/17  Yes Gayatri Santiago NP   mirtazapine (REMERON) 30 MG tablet Take 1 tablet (30 mg total) by mouth nightly. 5/29/17  Yes Juan Pablo Espino MD   omeprazole (PRILOSEC) 40 MG capsule Take 1 capsule (40 mg total) by mouth once daily. 5/29/17  Yes Juan Pablo Espino MD   tamsulosin (FLOMAX) 0.4 mg Cp24 Take 1 capsule (0.4 mg total) by mouth every evening. 5/29/17  Yes Juan Pablo Espino MD   meloxicam (MOBIC) 7.5 MG tablet TAKE 1 TABLET (7.5 MG TOTAL) BY MOUTH ONCE DAILY. 9/15/17 10/4/17  Gayatri Santiago NP       Review of Systems:  Review of Systems   Constitutional: Negative for chills, fever and unexpected weight change.   Eyes: Negative for visual disturbance.   Respiratory: Negative for cough, shortness of breath and wheezing.    Cardiovascular: Negative for chest pain and palpitations.   Gastrointestinal: Negative for abdominal pain, diarrhea and nausea.   Musculoskeletal: Positive for gait problem (frequent falls ). Negative for arthralgias.   Neurological: Positive for dizziness and weakness. Negative for light-headedness, numbness and headaches.       Health Maintainence:   Immunizations:  Health Maintenance       Date Due Completion Date    Pneumococcal (65+) (1 of 2 - PCV13) 02/01/2005 ---    Influenza Vaccine 08/01/2017  "10/1/2016    Lipid Panel 05/20/2022 5/20/2017    TETANUS VACCINE 05/29/2027 5/29/2017    Override on 9/1/2012: Done           PHYSICAL EXAM     /82 (BP Location: Right arm, Patient Position: Sitting, BP Method: Large (Manual))   Pulse (!) 59   Ht 5' 7" (1.702 m)   Wt 91 kg (200 lb 9.9 oz)   SpO2 96%   BMI 31.42 kg/m²     Physical Exam   Constitutional: He is oriented to person, place, and time. He appears well-developed and well-nourished.   HENT:   Head: Normocephalic.   Right Ear: External ear normal.   Left Ear: External ear normal.   Nose: Nose normal.   Mouth/Throat: Oropharynx is clear and moist. No oropharyngeal exudate.   Eyes: EOM are normal. Pupils are equal, round, and reactive to light.   Neck: No JVD present. No tracheal deviation present. No thyromegaly present.   Cardiovascular: Normal rate, regular rhythm and intact distal pulses.  Exam reveals no gallop and no friction rub.    No murmur heard.  Pulmonary/Chest: Breath sounds normal. No respiratory distress. He has no wheezes. He has no rales. He exhibits no tenderness.   Abdominal: Soft. Bowel sounds are normal. He exhibits no distension. There is no tenderness.   Musculoskeletal: Normal range of motion. He exhibits no edema or tenderness.   Lymphadenopathy:     He has no cervical adenopathy.   Neurological: He is alert and oriented to person, place, and time. He displays normal reflexes. No cranial nerve deficit or sensory deficit. He exhibits normal muscle tone.   Skin: Skin is warm and dry. Capillary refill takes less than 2 seconds. No rash noted.   Psychiatric: He has a normal mood and affect. His behavior is normal.   Vitals reviewed.      LABS     Lab Results   Component Value Date    HGBA1C 5.5 05/22/2017     CMP  Sodium   Date Value Ref Range Status   05/22/2017 142 136 - 145 mmol/L Final     Potassium   Date Value Ref Range Status   05/22/2017 3.6 3.5 - 5.1 mmol/L Final     Chloride   Date Value Ref Range Status   05/22/2017 111 " (H) 95 - 110 mmol/L Final     CO2   Date Value Ref Range Status   05/22/2017 22 (L) 23 - 29 mmol/L Final     Glucose   Date Value Ref Range Status   05/22/2017 78 70 - 110 mg/dL Final     BUN, Bld   Date Value Ref Range Status   05/22/2017 15 8 - 23 mg/dL Final     Creatinine   Date Value Ref Range Status   05/22/2017 0.7 0.5 - 1.4 mg/dL Final     Calcium   Date Value Ref Range Status   05/22/2017 8.7 8.7 - 10.5 mg/dL Final     Total Protein   Date Value Ref Range Status   05/22/2017 5.9 (L) 6.0 - 8.4 g/dL Final     Albumin   Date Value Ref Range Status   05/22/2017 3.4 (L) 3.5 - 5.2 g/dL Final     Total Bilirubin   Date Value Ref Range Status   05/22/2017 0.9 0.1 - 1.0 mg/dL Final     Comment:     For infants and newborns, interpretation of results should be based  on gestational age, weight and in agreement with clinical  observations.  Premature Infant recommended reference ranges:  Up to 24 hours.............<8.0 mg/dL  Up to 48 hours............<12.0 mg/dL  3-5 days..................<15.0 mg/dL  6-29 days.................<15.0 mg/dL       Alkaline Phosphatase   Date Value Ref Range Status   05/22/2017 35 (L) 55 - 135 U/L Final     AST   Date Value Ref Range Status   05/22/2017 17 10 - 40 U/L Final     ALT   Date Value Ref Range Status   05/22/2017 14 10 - 44 U/L Final     Anion Gap   Date Value Ref Range Status   05/22/2017 9 8 - 16 mmol/L Final     eGFR if    Date Value Ref Range Status   05/22/2017 >60.0 >60 mL/min/1.73 m^2 Final     eGFR if non    Date Value Ref Range Status   05/22/2017 >60.0 >60 mL/min/1.73 m^2 Final     Comment:     Calculation used to obtain the estimated glomerular filtration  rate (eGFR) is the CKD-EPI equation. Since race is unknown   in our information system, the eGFR values for   -American and Non--American patients are given   for each creatinine result.       Lab Results   Component Value Date    WBC 10.52 05/22/2017    HGB 13.4 (L)  05/22/2017    HCT 39.7 (L) 05/22/2017    MCV 95 05/22/2017     05/22/2017     Lab Results   Component Value Date    CHOL 165 05/20/2017    CHOL 233 (H) 03/28/2016    CHOL 251 (H) 12/09/2014     Lab Results   Component Value Date    HDL 56 05/20/2017    HDL 77 (H) 03/28/2016     (H) 12/09/2014     Lab Results   Component Value Date    LDLCALC 99.6 05/20/2017    LDLCALC 143.0 03/28/2016    LDLCALC 120.8 12/09/2014     Lab Results   Component Value Date    TRIG 47 05/20/2017    TRIG 65 03/28/2016    TRIG 46 12/09/2014     Lab Results   Component Value Date    CHOLHDL 33.9 05/20/2017    CHOLHDL 33.0 03/28/2016    CHOLHDL 48.2 12/09/2014     Lab Results   Component Value Date    TSH 1.591 05/20/2017       ASSESSMENT/PLAN     Taran Crowell is a 77 y.o. 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 No mass effect or hemorrhage. Additional remote lacunar type infarcts noted in this same region. Decreased signal in the distal right vertebral artery, suggesting hypoplasia or stenosis. The vertebral basilar system is left-sided dominant.    Chronic anticoagulation - on apixaban 5/29/2017    Essential tremor 5/29/2017    On Remeron    GERD (gastroesophageal reflux disease)     History of colon polyps 3/18/2014    Hypothyroidism due to acquired atrophy of thyroid 09/30/2015    Last on 2015.  None since then.    Memory loss last months.    Mixed hyperlipidemia 5/29/2017    PAF (paroxysmal atrial fibrillation) 6/16/2015     Frequent falls- advised to f/u with neurology. Physical exam acceptable. Will refer to PT.   -     Cancel: Ambulatory Referral to Physical/Occupational Therapy  -     Ambulatory Referral to Physical/Occupational  Therapy    Dysphagia, unspecified type- Will refer to speech therapy. No coughing with thin liquids. Advised to chew carefully   -     Ambulatory Referral to Speech Therapy    LUNA (obstructive sleep apnea)- will order CPAP at settings advised from sleep study   -     CPAP FOR HOME USE    History of stroke- f/u with neurology. Cont lipitor    Cerebral infarction due to embolism of cerebellar artery, unspecified blood vessel laterality- cont lipitor.     PAF (paroxysmal atrial fibrillation)- rate controlled. Pulse regular. Cont xarelto.     Chronic anticoagulation - on apixaban    Essential tremor- stable. Cont remeron          Follow up with PCP    Patient education provided from Rebecca. Patient was counseled on when and how to seek emergent care.       Gayatri NICOLE, JAY JAY, FNP-c   Department of Internal Medicine - [facility]  3:26 PM

## 2017-10-05 NOTE — TELEPHONE ENCOUNTER
----- Message from An Lindsey sent at 10/5/2017  2:47 PM CDT -----  Contact: Crystal cross [phone]  Crystal is calling regarding order that was received for a CPAP machine,it needs to have the NPI number written on the order. Please refax order to [phone].    Thank you

## 2017-10-20 RX ORDER — MELOXICAM 7.5 MG/1
TABLET ORAL
Qty: 20 TABLET | Refills: 0 | Status: SHIPPED | OUTPATIENT
Start: 2017-10-20 | End: 2017-12-14

## 2017-10-25 ENCOUNTER — OFFICE VISIT (OUTPATIENT)
Dept: INTERNAL MEDICINE | Facility: CLINIC | Age: 77
End: 2017-10-25
Payer: MEDICARE

## 2017-10-25 VITALS
HEART RATE: 67 BPM | HEIGHT: 67 IN | DIASTOLIC BLOOD PRESSURE: 80 MMHG | OXYGEN SATURATION: 95 % | WEIGHT: 196.63 LBS | SYSTOLIC BLOOD PRESSURE: 110 MMHG | BODY MASS INDEX: 30.86 KG/M2

## 2017-10-25 DIAGNOSIS — K21.9 GASTROESOPHAGEAL REFLUX DISEASE WITHOUT ESOPHAGITIS: Chronic | ICD-10-CM

## 2017-10-25 DIAGNOSIS — N40.0 BENIGN NODULAR PROSTATIC HYPERPLASIA: ICD-10-CM

## 2017-10-25 DIAGNOSIS — D64.9 ANEMIA, UNSPECIFIED TYPE: ICD-10-CM

## 2017-10-25 DIAGNOSIS — J30.1 CHRONIC SEASONAL ALLERGIC RHINITIS DUE TO POLLEN: ICD-10-CM

## 2017-10-25 DIAGNOSIS — Z13.29 SCREENING FOR THYROID DISORDER: ICD-10-CM

## 2017-10-25 DIAGNOSIS — Z79.01 CHRONIC ANTICOAGULATION: Chronic | ICD-10-CM

## 2017-10-25 DIAGNOSIS — E78.2 MIXED HYPERLIPIDEMIA: Chronic | ICD-10-CM

## 2017-10-25 DIAGNOSIS — G47.33 OSA (OBSTRUCTIVE SLEEP APNEA): ICD-10-CM

## 2017-10-25 DIAGNOSIS — L02.03 CARBUNCLE OF FACE: ICD-10-CM

## 2017-10-25 DIAGNOSIS — I48.0 PAF (PAROXYSMAL ATRIAL FIBRILLATION): ICD-10-CM

## 2017-10-25 DIAGNOSIS — Z86.73 HISTORY OF STROKE: Primary | ICD-10-CM

## 2017-10-25 DIAGNOSIS — R13.10 DYSPHAGIA, UNSPECIFIED TYPE: ICD-10-CM

## 2017-10-25 PROCEDURE — 99214 OFFICE O/P EST MOD 30 MIN: CPT | Mod: PBBFAC | Performed by: INTERNAL MEDICINE

## 2017-10-25 PROCEDURE — 99999 PR PBB SHADOW E&M-EST. PATIENT-LVL IV: CPT | Mod: PBBFAC,,, | Performed by: INTERNAL MEDICINE

## 2017-10-25 PROCEDURE — 99214 OFFICE O/P EST MOD 30 MIN: CPT | Mod: S$PBB,,, | Performed by: INTERNAL MEDICINE

## 2017-10-25 RX ORDER — MUPIROCIN 20 MG/G
OINTMENT TOPICAL 3 TIMES DAILY
Qty: 15 G | Refills: 0 | Status: SHIPPED | OUTPATIENT
Start: 2017-10-25

## 2017-10-25 NOTE — PROGRESS NOTES
"INTERNAL MEDICINE INITIAL VISIT NOTE      CHIEF COMPLAINT     Chief Complaint   Patient presents with    [facility]       HPI     Taran Crowell is a 77 y.o.  male who presents with a PMHx of embolic CVA in 5/2017 at which time presented c sx of dysarthria, dizziness and facial droop c MRI showing infarct in that time showing R PICA infarct.  Pt also c HLD, PAF on apixaban, BPH, GERD, seasonal allergies, LUNA on cpap, baseline tremor on Remeron by Neuro (says Parkinson's w/u was neg), here to [facility].    Currently at baseline reports he has intermittent dysphagia (was prev evaluated by speech and was advanced to a regular diet but now c/o it feels like he can't swallow his saliva at times) and reports his legs are "slower" than they were before.  Denies cognitive deficits since the stroke.    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    Lives at [facility].  His wife who reports she was gone for several weeks last month says pt had several falls while she was gone.    One fall occurred while getting up at dinner. Pt unsure of details but feels like his legs gave out on him.  Does not think he lost consciousness but says he does not remember details of the event.    Says he used a walker for the first few weeks after the stroke but no longer uses it bc he doesn't think he needs it.    Does PT c group training at [facility] for upper body 3x/week.  Also goes to [facility] 3 days/week for back and legs.    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 No mass effect or hemorrhage. Additional remote lacunar type infarcts noted in this same region. Decreased signal in the distal right vertebral artery, suggesting " hypoplasia or stenosis. The vertebral basilar system is left-sided dominant.    Chronic anticoagulation - on apixaban 5/29/2017    Essential tremor 5/29/2017    On Remeron    GERD (gastroesophageal reflux disease)     History of colon polyps 3/18/2014    Hypothyroidism due to acquired atrophy of thyroid 09/30/2015    Last on 2015.  None since then.    Memory loss last months.    Mixed hyperlipidemia 5/29/2017    PAF (paroxysmal atrial fibrillation) 6/16/2015       Past Surgical History:  Past Surgical History:   Procedure Laterality Date    COLONOSCOPY  6/23/2000  ([place])    Internal hemorrhoids, otherwise normal exam.    COLONOSCOPY W/ POLYPECTOMY  2/12/2010  [facility]    One less than 1 mm polyp in the distal sigmoid.  TUBULAR ADENOMA.    One less than 1 mm polyp in the cecum.  TUBULAR ADENOMA.    Non-bleeding internal hemorrhoids.       UPPER GASTROINTESTINAL ENDOSCOPY  2/7/2007  (Dr. Bourgeois)    Grade 1 reflux esophagitis.  Small/medium hiatal hernia.  Pylorus erythema.    UPPER GASTROINTESTINAL ENDOSCOPY  2/12/2010  [facility]    Slight reflux esophagitis.  Z-line regular, 30 cm from the incisors.   Moderate / large hiatus hernia.  Normal stomach and duodenum.  SELAM Test  Negative.        Home Medications:  Prior to Admission medications    Medication Sig Start Date End Date Taking? Authorizing Provider   apixaban 5 mg Tab Take 1 tablet (5 mg total) by mouth 2 (two) times daily. 5/29/17   Juan Pablo Espino MD   atorvastatin (LIPITOR) 40 MG tablet Take 1 tablet (40 mg total) by mouth once daily. 5/29/17   Juan Pablo Espino MD   finasteride (PROSCAR) 5 mg tablet Take 1 tablet (5 mg total) by mouth once daily. 6/5/17 6/5/18  Juan Pablo Espino MD   fluticasone (FLONASE) 50 mcg/actuation nasal spray 2 sprays by Each Nare route once daily. 5/29/17   Juan Pablo Espino MD   meloxicam (MOBIC) 7.5 MG tablet TAKE 1 TABLET EVERY DAY 10/20/17   Gayatri Santiago NP   mirtazapine (REMERON) 30 MG tablet Take 1 tablet (30 mg total) by  mouth nightly. 17   Juan Pablo Espino MD   omeprazole (PRILOSEC) 40 MG capsule Take 1 capsule (40 mg total) by mouth once daily. 17   Juan Pablo Espino MD   tamsulosin (FLOMAX) 0.4 mg Cp24 Take 1 capsule (0.4 mg total) by mouth every evening. 17   Juan Pablo Espino MD       Allergies:  Review of patient's allergies indicates:  No Known Allergies    Family History:  Family History   Problem Relation Age of Onset    Cancer Father      sarcoma,  of       Social History:  Social History   Substance Use Topics    Smoking status: Former Smoker     Types: Pipe    Smokeless tobacco: Former User      Comment: smoked pipe regularly once a day but quit 10-15 yrs ago    Alcohol use Yes      Comment: social       Review of Systems:  Review of Systems   Constitutional: Positive for unexpected weight change. Negative for activity change.   HENT: Positive for trouble swallowing. Negative for hearing loss and rhinorrhea.    Eyes: Negative for discharge and visual disturbance.   Respiratory: Negative for chest tightness and wheezing.    Cardiovascular: Negative for chest pain and palpitations.   Gastrointestinal: Negative for blood in stool, constipation, diarrhea and vomiting.   Endocrine: Negative for polydipsia and polyuria.   Genitourinary: Negative for difficulty urinating, hematuria and urgency.   Musculoskeletal: Positive for arthralgias. Negative for joint swelling and neck pain.   Neurological: Positive for weakness. Negative for headaches.   Psychiatric/Behavioral: Negative for confusion and dysphoric mood.       Health Maintenance:   Immunizations:   Influenza is up to date 2017 at [facility]  TDap is up to date 2017  Pneumovax is up to date.  Pt to bring yellow card of vacc records to see if both given.  Zostavax is UTD.  2011    Cancer Screening:  Colonoscopy: is up to date. 3/2014  Int hemorrhoids, diverticula, otherwise normal c rec to repeat in 6-7 years.        PHYSICAL EXAM     /80 (BP  "Location: Right arm, Patient Position: Sitting)   Pulse 67   Ht 5' 7" (1.702 m)   Wt 89.2 kg (196 lb 10.4 oz)   SpO2 95%   BMI 30.80 kg/m²     GEN - A+OX4, NAD  HEENT - PERRL, EOMI, OP clear  Neck - No thyromegaly or cervical LAD. No thyroid masses felt.  CV - RRR, no m/r/g  Chest - CTAB, no wheezing, crackles, or rhonchi  Abd - S/NT/ND/+BS.   Ext - 2+BDP and radial pulses. No C/C/E.  Neuro - 5/5 BUE and BLE strength.  LN - No LAD appreciated.  Skin - Normal color and texture, no rash, small pustule on tip of nose, slight erythema, no underlying fluctuance.    LABS     Lab Results   Component Value Date    WBC 10.52 05/22/2017    HGB 13.4 (L) 05/22/2017    HCT 39.7 (L) 05/22/2017    MCV 95 05/22/2017     05/22/2017     CMP  Sodium   Date Value Ref Range Status   05/22/2017 142 136 - 145 mmol/L Final     Potassium   Date Value Ref Range Status   05/22/2017 3.6 3.5 - 5.1 mmol/L Final     Chloride   Date Value Ref Range Status   05/22/2017 111 (H) 95 - 110 mmol/L Final     CO2   Date Value Ref Range Status   05/22/2017 22 (L) 23 - 29 mmol/L Final     Glucose   Date Value Ref Range Status   05/22/2017 78 70 - 110 mg/dL Final     BUN, Bld   Date Value Ref Range Status   05/22/2017 15 8 - 23 mg/dL Final     Creatinine   Date Value Ref Range Status   05/22/2017 0.7 0.5 - 1.4 mg/dL Final     Calcium   Date Value Ref Range Status   05/22/2017 8.7 8.7 - 10.5 mg/dL Final     Total Protein   Date Value Ref Range Status   05/22/2017 5.9 (L) 6.0 - 8.4 g/dL Final     Albumin   Date Value Ref Range Status   05/22/2017 3.4 (L) 3.5 - 5.2 g/dL Final     Total Bilirubin   Date Value Ref Range Status   05/22/2017 0.9 0.1 - 1.0 mg/dL Final     Comment:     For infants and newborns, interpretation of results should be based  on gestational age, weight and in agreement with clinical  observations.  Premature Infant recommended reference ranges:  Up to 24 hours.............<8.0 mg/dL  Up to 48 hours............<12.0 mg/dL  3-5 " days..................<15.0 mg/dL  6-29 days.................<15.0 mg/dL       Alkaline Phosphatase   Date Value Ref Range Status   05/22/2017 35 (L) 55 - 135 U/L Final     AST   Date Value Ref Range Status   05/22/2017 17 10 - 40 U/L Final     ALT   Date Value Ref Range Status   05/22/2017 14 10 - 44 U/L Final     Anion Gap   Date Value Ref Range Status   05/22/2017 9 8 - 16 mmol/L Final     eGFR if    Date Value Ref Range Status   05/22/2017 >60.0 >60 mL/min/1.73 m^2 Final     eGFR if non    Date Value Ref Range Status   05/22/2017 >60.0 >60 mL/min/1.73 m^2 Final     Comment:     Calculation used to obtain the estimated glomerular filtration  rate (eGFR) is the CKD-EPI equation. Since race is unknown   in our information system, the eGFR values for   -American and Non--American patients are given   for each creatinine result.       Lab Results   Component Value Date    LDLCALC 99.6 05/20/2017     Lab Results   Component Value Date    TSH 1.591 05/20/2017     Lab Results   Component Value Date    HGBA1C 5.5 05/22/2017         ASSESSMENT/PLAN     Taran Crowell is a 77 y.o. male with  Taran was seen today for establish care.    Diagnoses and all orders for this visit:    History of stroke       - As per HPI, embolic, will cont secondary prevention c Apixaban as well as bp and lipid control.  Statin increased at discharge in may, will repeat labs now.  Still c residual LE weakness and dysphagia, just restarted PT at [place], was advised to use a cane and discuss assistive devices with his therapist.  Regarding residual dysphagia, will have speech reassess but will also refer to GI as pt has hx GERD and had EGD in the past,need records of EGD in [place].    Dysphagia, unspecified type  -     Mgmt as above  - Ambulatory Referral to Speech Therapy  -     Ambulatory referral to Gastroenterology    Mixed hyperlipidemia  -       Lab Results   Component Value Date    LDLCALC  99.6 05/20/2017     Now on high dose statin since stroke, will repeat labs.  Lipid panel; Future; Expected date: 10/25/2017    PAF (paroxysmal atrial fibrillation)  -     Currently rate and rhythm controlled, cont apixaban for stroke prevention.  - Comprehensive metabolic panel; Future; Expected date: 10/25/2017  -     TSH; Future; Expected date: 10/25/2017  -     Ambulatory referral to Cardiology    Chronic anticoagulation - on apixaban       - No issues, no bleeding.    Benign nodular prostatic hyperplasia       - Stable on finasteride, will cont meds    Gastroesophageal reflux disease without esophagitis       - No acute issues, cont omeprazole    LUNA (obstructive sleep apnea)        - Not currently using cpap due to poorly fitted mask and reports he feels like pressure is too high.  Has upcoming appt c sleep clinic to address.    Chronic seasonal allergic rhinitis due to pollen       - Stable on flonase, cont meds.    Carbuncle of face  -     mupirocin (BACTROBAN) 2 % ointment; Apply topically 3 (three) times daily.       - Derm eval if sx do not resolve in 2 weeks or so.    Screening for thyroid disorder  -     TSH; Future; Expected date: 10/25/2017    Anemia, unspecified type  -     Mild, will w/u, csc utd and has had egd in the past  - CBC auto differential; Future; Expected date: 10/25/2017  -     Iron and TIBC; Future; Expected date: 10/25/2017  -     Ferritin; Future; Expected date: 10/25/2017      HM as above    RTC in 3 months, sooner if needed and depending on labs.    Kemi Lopez MD  Department of Internal Medicine - [facility] [place]  10/25/2017   10:15 AM

## 2017-10-30 ENCOUNTER — OFFICE VISIT (OUTPATIENT)
Dept: CARDIOLOGY | Facility: CLINIC | Age: 77
End: 2017-10-30
Payer: MEDICARE

## 2017-10-30 ENCOUNTER — LAB VISIT (OUTPATIENT)
Dept: LAB | Facility: HOSPITAL | Age: 77
End: 2017-10-30
Attending: INTERNAL MEDICINE
Payer: MEDICARE

## 2017-10-30 VITALS
SYSTOLIC BLOOD PRESSURE: 111 MMHG | WEIGHT: 198.19 LBS | HEIGHT: 67 IN | BODY MASS INDEX: 31.11 KG/M2 | DIASTOLIC BLOOD PRESSURE: 65 MMHG | HEART RATE: 86 BPM

## 2017-10-30 DIAGNOSIS — D64.9 ANEMIA, UNSPECIFIED TYPE: ICD-10-CM

## 2017-10-30 DIAGNOSIS — G47.33 OSA (OBSTRUCTIVE SLEEP APNEA): ICD-10-CM

## 2017-10-30 DIAGNOSIS — I48.0 PAF (PAROXYSMAL ATRIAL FIBRILLATION): ICD-10-CM

## 2017-10-30 DIAGNOSIS — E78.2 MIXED HYPERLIPIDEMIA: Chronic | ICD-10-CM

## 2017-10-30 DIAGNOSIS — Z79.01 CHRONIC ANTICOAGULATION: Chronic | ICD-10-CM

## 2017-10-30 DIAGNOSIS — Z13.29 SCREENING FOR THYROID DISORDER: ICD-10-CM

## 2017-10-30 DIAGNOSIS — I63.442 CEREBRAL INFARCTION DUE TO EMBOLISM OF LEFT CEREBELLAR ARTERY: ICD-10-CM

## 2017-10-30 DIAGNOSIS — G25.0 ESSENTIAL TREMOR: Chronic | ICD-10-CM

## 2017-10-30 DIAGNOSIS — K21.9 GASTROESOPHAGEAL REFLUX DISEASE WITHOUT ESOPHAGITIS: Chronic | ICD-10-CM

## 2017-10-30 DIAGNOSIS — I48.0 PAF (PAROXYSMAL ATRIAL FIBRILLATION): Primary | ICD-10-CM

## 2017-10-30 LAB
ALBUMIN SERPL BCP-MCNC: 3.9 G/DL
ALP SERPL-CCNC: 46 U/L
ALT SERPL W/O P-5'-P-CCNC: 20 U/L
ANION GAP SERPL CALC-SCNC: 8 MMOL/L
AST SERPL-CCNC: 29 U/L
BASOPHILS # BLD AUTO: 0.06 K/UL
BASOPHILS NFR BLD: 0.8 %
BILIRUB SERPL-MCNC: 0.8 MG/DL
BUN SERPL-MCNC: 18 MG/DL
CALCIUM SERPL-MCNC: 9.4 MG/DL
CHLORIDE SERPL-SCNC: 107 MMOL/L
CHOLEST SERPL-MCNC: 181 MG/DL
CHOLEST/HDLC SERPL: 2.5 {RATIO}
CO2 SERPL-SCNC: 28 MMOL/L
CREAT SERPL-MCNC: 1 MG/DL
DIFFERENTIAL METHOD: ABNORMAL
EOSINOPHIL # BLD AUTO: 0.2 K/UL
EOSINOPHIL NFR BLD: 2.3 %
ERYTHROCYTE [DISTWIDTH] IN BLOOD BY AUTOMATED COUNT: 14.1 %
EST. GFR  (AFRICAN AMERICAN): >60 ML/MIN/1.73 M^2
EST. GFR  (NON AFRICAN AMERICAN): >60 ML/MIN/1.73 M^2
FERRITIN SERPL-MCNC: 27 NG/ML
GLUCOSE SERPL-MCNC: 95 MG/DL
HCT VFR BLD AUTO: 45.8 %
HDLC SERPL-MCNC: 71 MG/DL
HDLC SERPL: 39.2 %
HGB BLD-MCNC: 14.9 G/DL
IRON SERPL-MCNC: 88 UG/DL
LDLC SERPL CALC-MCNC: 98.6 MG/DL
LYMPHOCYTES # BLD AUTO: 2.4 K/UL
LYMPHOCYTES NFR BLD: 32.2 %
MCH RBC QN AUTO: 31.2 PG
MCHC RBC AUTO-ENTMCNC: 32.5 G/DL
MCV RBC AUTO: 96 FL
MONOCYTES # BLD AUTO: 0.6 K/UL
MONOCYTES NFR BLD: 7.6 %
NEUTROPHILS # BLD AUTO: 4.2 K/UL
NEUTROPHILS NFR BLD: 56.6 %
NONHDLC SERPL-MCNC: 110 MG/DL
NRBC BLD-RTO: 0 /100 WBC
PLATELET # BLD AUTO: 223 K/UL
PMV BLD AUTO: 11.6 FL
POTASSIUM SERPL-SCNC: 4.7 MMOL/L
PROT SERPL-MCNC: 6.9 G/DL
RBC # BLD AUTO: 4.77 M/UL
SATURATED IRON: 24 %
SODIUM SERPL-SCNC: 143 MMOL/L
TOTAL IRON BINDING CAPACITY: 367 UG/DL
TRANSFERRIN SERPL-MCNC: 248 MG/DL
TRIGL SERPL-MCNC: 57 MG/DL
TSH SERPL DL<=0.005 MIU/L-ACNC: 3.83 UIU/ML
WBC # BLD AUTO: 7.48 K/UL

## 2017-10-30 PROCEDURE — 80053 COMPREHEN METABOLIC PANEL: CPT

## 2017-10-30 PROCEDURE — 36415 COLL VENOUS BLD VENIPUNCTURE: CPT

## 2017-10-30 PROCEDURE — 85025 COMPLETE CBC W/AUTO DIFF WBC: CPT

## 2017-10-30 PROCEDURE — 83540 ASSAY OF IRON: CPT

## 2017-10-30 PROCEDURE — 99213 OFFICE O/P EST LOW 20 MIN: CPT | Mod: PBBFAC | Performed by: INTERNAL MEDICINE

## 2017-10-30 PROCEDURE — 82728 ASSAY OF FERRITIN: CPT

## 2017-10-30 PROCEDURE — 99999 PR PBB SHADOW E&M-EST. PATIENT-LVL III: CPT | Mod: PBBFAC,,, | Performed by: INTERNAL MEDICINE

## 2017-10-30 PROCEDURE — 80061 LIPID PANEL: CPT

## 2017-10-30 PROCEDURE — 84443 ASSAY THYROID STIM HORMONE: CPT

## 2017-10-30 PROCEDURE — 99214 OFFICE O/P EST MOD 30 MIN: CPT | Mod: S$PBB,,, | Performed by: INTERNAL MEDICINE

## 2017-10-30 NOTE — LETTER
October 30, 2017      Kemi Lopez MD  [address]  [place] [address]           [facility] - Cardiology  [address]  [place] [address]  Phone: [phone]          Patient: Taran Crowell   MR Number: [id number]   YOB: 1940   Date of Visit: 10/30/2017       Dear Dr. Hall:    Thank you for referring Taran Crowell to me for evaluation. Attached you will find relevant portions of my assessment and plan of care.    If you have questions, please do not hesitate to call me. I look forward to following Taran Crowell along with you.    Sincerely,    William Peralta MD    Enclosure  CC:  No Recipients    If you would like to receive this communication electronically, please contact [email or web address] or [phone] to request more information on [facility] access.    For providers and/or their staff who would like to refer a patient to [facility], please contact us through our one-stop-shop provider referral line, Madeline Craig, at [phone].    If you feel you have received this communication in error or would no longer like to receive these types of communications, please e-mail [email or web address]

## 2017-10-30 NOTE — PROGRESS NOTES
Subjective:    Patient ID:  Taran Crowell is a 77 y.o. male who presents for evaluation of Atrial Fibrillation (hx of )      HPI   The patient os a 77 year old male with post history of CVA 5/24/17[ cerebellar] and paroxsymal atrial fibrillation[ 2015] . He has been under the care of Dr Herrmann .  He has LUNA but has difficulty with the CPAP and is scheduled to revisit sleep clinic. He has been on amiodarone and multacq I the past but did not tolerate. He has a 2 hour episode of AF self limited last week. He was placed on a NOAC initially but not on one at the time of his CVA. He has had no bleeding issues. He exercises 3 time a week. He has no chest pain or HAY.      CONCLUSIONS     1 - Normal left ventricular systolic function (EF 60-65%).     2 - Concentric remodeling.     3 - No wall motion abnormalities.     4 - Normal left ventricular diastolic function.     5 - Right atrial enlargement.     6 - Normal right ventricular systolic function .     7 - Trivial aortic regurgitation.     8 - Trivial tricuspid regurgitation.             This document has been electronically    SIGNED BY: Kannan Ceja MD On: 05/21/2017 13:43    CONCLUSIONS     1 - Concentric remodeling.     2 - Normal left ventricular systolic function (EF 60-65%).     3 - Normal left ventricular diastolic function.     4 - Normal right ventricular systolic function .     5 - The estimated PA systolic pressure is 17 mmHg.     6 - Trivial aortic regurgitation.     7 - Trivial to mild mitral regurgitation.     8 - Mild tricuspid regurgitation.     No evidence of stress induced myocardial ischemia.     This document has been electronically    SIGNED BY: Haim Herrmann MD On: 03/28/2016 10:16  Lab Results   Component Value Date     05/22/2017    K 3.6 05/22/2017     (H) 05/22/2017    CO2 22 (L) 05/22/2017    BUN 15 05/22/2017    CREATININE 0.7 05/22/2017    GLU 78 05/22/2017    HGBA1C 5.5 05/22/2017    MG 2.0 05/22/2017    AST 17  05/22/2017    ALT 14 05/22/2017    ALBUMIN 3.4 (L) 05/22/2017    PROT 5.9 (L) 05/22/2017    BILITOT 0.9 05/22/2017    WBC 10.52 05/22/2017    HGB 13.4 (L) 05/22/2017    HCT 39.7 (L) 05/22/2017    MCV 95 05/22/2017     05/22/2017    INR 1.0 05/22/2017    PSA 1.6 12/09/2014    TSH 1.591 05/20/2017         Lab Results   Component Value Date    CHOL 165 05/20/2017    HDL 56 05/20/2017    TRIG 47 05/20/2017       Lab Results   Component Value Date    LDLCALC 99.6 05/20/2017       Past Medical History:   Diagnosis Date    Benign nodular prostatic hyperplasia 5/29/2017    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 No mass effect or hemorrhage. Additional remote lacunar type infarcts noted in this same region. Decreased signal in the distal right vertebral artery, suggesting hypoplasia or stenosis. The vertebral basilar system is left-sided dominant.    Chronic anticoagulation - on apixaban 5/29/2017    Essential tremor 5/29/2017    On Remeron    GERD (gastroesophageal reflux disease)     History of colon polyps 3/18/2014    Hypothyroidism due to acquired atrophy of thyroid 09/30/2015    Last on 2015.  None since then.    Memory loss last months.    Mixed hyperlipidemia 5/29/2017    PAF (paroxysmal atrial fibrillation) 6/16/2015       Current Outpatient Prescriptions:     apixaban 5 mg Tab, Take 1 tablet (5 mg total) by mouth 2 (two) times daily., Disp: 180 tablet, Rfl: 4    atorvastatin (LIPITOR) 40 MG tablet, Take 1 tablet (40 mg total) by mouth once daily., Disp: 90 tablet, Rfl: 4    finasteride (PROSCAR) 5 mg tablet, Take 1 tablet (5 mg total) by mouth once daily., Disp: 30 tablet, Rfl: 11    fluticasone (FLONASE) 50 mcg/actuation nasal spray, 2 sprays by Each Nare route once daily., Disp: 3 Bottle, Rfl: 4    meloxicam (MOBIC)  7.5 MG tablet, TAKE 1 TABLET EVERY DAY, Disp: 20 tablet, Rfl: 0    mirtazapine (REMERON) 30 MG tablet, Take 1 tablet (30 mg total) by mouth nightly., Disp: 90 tablet, Rfl: 4    mupirocin (BACTROBAN) 2 % ointment, Apply topically 3 (three) times daily., Disp: 15 g, Rfl: 0    omeprazole (PRILOSEC) 40 MG capsule, Take 1 capsule (40 mg total) by mouth once daily., Disp: 90 capsule, Rfl: 4    tamsulosin (FLOMAX) 0.4 mg Cp24, Take 1 capsule (0.4 mg total) by mouth every evening., Disp: 90 capsule, Rfl: 4        Review of Systems   Constitution: Negative for decreased appetite, diaphoresis, fever, weakness, malaise/fatigue, weight gain and weight loss.   HENT: Negative for congestion, ear discharge, ear pain and nosebleeds.    Eyes: Negative for blurred vision, double vision and visual disturbance.   Cardiovascular: Negative for chest pain, claudication, cyanosis, dyspnea on exertion, irregular heartbeat, leg swelling, near-syncope, orthopnea, palpitations, paroxysmal nocturnal dyspnea and syncope.   Respiratory: Negative for cough, hemoptysis, shortness of breath, sleep disturbances due to breathing, snoring, sputum production and wheezing.    Endocrine: Negative for polydipsia, polyphagia and polyuria.   Hematologic/Lymphatic: Negative for adenopathy and bleeding problem. Does not bruise/bleed easily.   Skin: Negative for color change, nail changes, poor wound healing and rash.   Musculoskeletal: Negative for muscle cramps and muscle weakness.   Gastrointestinal: Negative for abdominal pain, anorexia, change in bowel habit, hematochezia, nausea and vomiting.        Swallowing impairement post CVA but largely resolved   Genitourinary: Negative for dysuria, frequency and hematuria.   Neurological: Negative for brief paralysis, difficulty with concentration, excessive daytime sleepiness, dizziness, focal weakness, headaches, light-headedness, seizures and vertigo.   Psychiatric/Behavioral: Negative for altered mental  "status and depression.   Allergic/Immunologic: Negative for persistent infections.        Objective:/65   Pulse 86   Ht 5' 7" (1.702 m)   Wt 89.9 kg (198 lb 3.1 oz)   BMI 31.04 kg/m²           Physical Exam   Constitutional: He is oriented to person, place, and time. He appears well-developed and well-nourished.   obese   HENT:   Head: Normocephalic.   Right Ear: External ear normal.   Left Ear: External ear normal.   Nose: Nose normal.   Inspection of lips, teeth and gums normal   Eyes: EOM are normal. Pupils are equal, round, and reactive to light. No scleral icterus.   Neck: Normal range of motion. Neck supple. No JVD present. No tracheal deviation present. No thyromegaly present.   Cardiovascular: Normal rate, regular rhythm and intact distal pulses.  Exam reveals no gallop and no friction rub.    No murmur heard.  Pulses:       Dorsalis pedis pulses are 2+ on the right side, and 2+ on the left side.        Posterior tibial pulses are 2+ on the right side, and 2+ on the left side.   Pulmonary/Chest: Effort normal and breath sounds normal.   Abdominal: Bowel sounds are normal. He exhibits no distension. There is no hepatosplenomegaly. There is no tenderness. There is no guarding.   Musculoskeletal: Normal range of motion. He exhibits no edema or tenderness.   Lymphadenopathy:   Palpation of neck and groin lymph nodes normal   Neurological: He is alert and oriented to person, place, and time. No cranial nerve deficit. He exhibits normal muscle tone. Coordination normal.   Skin: Skin is dry.   Palpation of skin normal   Psychiatric: His behavior is normal. Judgment and thought content normal.         Assessment:       1. PAF (paroxysmal atrial fibrillation)    2. 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 Essential tremor    4. Chronic anticoagulation - on apixaban    5. Gastroesophageal reflux disease without esophagitis    6. LUNA (obstructive sleep apnea)         Plan:     "   Taran was seen today for atrial fibrillation.    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

## 2017-11-01 ENCOUNTER — INITIAL CONSULT (OUTPATIENT)
Dept: ELECTROPHYSIOLOGY | Facility: CLINIC | Age: 77
End: 2017-11-01
Payer: MEDICARE

## 2017-11-01 ENCOUNTER — HOSPITAL ENCOUNTER (OUTPATIENT)
Dept: CARDIOLOGY | Facility: CLINIC | Age: 77
Discharge: HOME | End: 2017-11-01
Payer: MEDICARE

## 2017-11-01 VITALS
HEART RATE: 65 BPM | SYSTOLIC BLOOD PRESSURE: 116 MMHG | BODY MASS INDEX: 30.97 KG/M2 | WEIGHT: 197.31 LBS | HEIGHT: 67 IN | DIASTOLIC BLOOD PRESSURE: 68 MMHG

## 2017-11-01 DIAGNOSIS — Z86.73 HISTORY OF STROKE: ICD-10-CM

## 2017-11-01 DIAGNOSIS — G47.33 OSA (OBSTRUCTIVE SLEEP APNEA): ICD-10-CM

## 2017-11-01 DIAGNOSIS — Z79.01 CHRONIC ANTICOAGULATION: Chronic | ICD-10-CM

## 2017-11-01 DIAGNOSIS — I48.0 PAF (PAROXYSMAL ATRIAL FIBRILLATION): Primary | ICD-10-CM

## 2017-11-01 PROCEDURE — 99213 OFFICE O/P EST LOW 20 MIN: CPT | Mod: PBBFAC,25 | Performed by: INTERNAL MEDICINE

## 2017-11-01 PROCEDURE — 99205 OFFICE O/P NEW HI 60 MIN: CPT | Mod: S$PBB,,, | Performed by: INTERNAL MEDICINE

## 2017-11-01 PROCEDURE — 99999 PR PBB SHADOW E&M-EST. PATIENT-LVL III: CPT | Mod: PBBFAC,,, | Performed by: INTERNAL MEDICINE

## 2017-11-01 PROCEDURE — 93010 ELECTROCARDIOGRAM REPORT: CPT | Mod: S$PBB,,, | Performed by: INTERNAL MEDICINE

## 2017-11-01 PROCEDURE — 93005 ELECTROCARDIOGRAM TRACING: CPT | Mod: PBBFAC | Performed by: INTERNAL MEDICINE

## 2017-11-01 RX ORDER — METOPROLOL TARTRATE 25 MG/1
12.5 TABLET, FILM COATED ORAL 2 TIMES DAILY
Qty: 30 TABLET | Refills: 11 | Status: SHIPPED | OUTPATIENT
Start: 2017-11-01 | End: 2018-01-04 | Stop reason: SDUPTHER

## 2017-11-01 RX ORDER — FLECAINIDE ACETATE 50 MG/1
50 TABLET ORAL EVERY 12 HOURS
Qty: 60 TABLET | Refills: 11 | Status: SHIPPED | OUTPATIENT
Start: 2017-11-01 | End: 2018-12-17 | Stop reason: SDUPTHER

## 2017-11-01 NOTE — PROGRESS NOTES
Subjective:    Patient ID:  Taran Crowell is a 77 y.o. male who presents for evaluation of PAF    Referring Cardiologist: Irene Peratla MD    HPI  I had the pleasure of seeing Mr. Crowell in our electrophysiology clinic today in consultation for his atrial fibrillation. As you are aware he is a pleasant 77 year-old man with prior TIAs/stroke, obstructive sleep apnea, and paroxysmal atrial fibrillation. He was admitted to [facility] May of 2015 with paroxysmal AF. He was started on amiodarone and eliquis. Shortly after he was switched to multaq due to possible side effects. Multaq was stopped due to concern that he was noticing confusion. He also had a fall at home with bruising and his eliquis was stopped. He did well until May of 2017 when he was admitted to [facility] with right PICA embolic stroke. He was discharged on eliquis. He presented to Dr. Peralta this week with 2 episodes of paroxysmal palpitations. Notes feeling fatigued with racing heart. Each episode lasted ~2 hours. He otherwise has been feeling well. He had an exercise stress echocardiogram in March of 2016 that showed no ischemia. His most recent echocardiogram in May of 2017 noted a preserved LVEF without any significant valvular disease and a normal left atrial volume.    I reviewed all electrocardiograms available in [facility]. All show sinus rhythm except for 5/11/2015 which shows atrial fibrillation and rapid ventricular response.    My interpretation of today's in clinic electrocardiogram is normal sinus rhythm at 65 bpm with left axis deviation but otherwise normal ECG.    Review of Systems   Constitution: Negative for weakness and malaise/fatigue.   HENT: Negative for congestion.    Eyes: Negative for blurred vision and visual disturbance.   Cardiovascular: Positive for irregular heartbeat and palpitations. Negative for chest pain, dyspnea on exertion, near-syncope, orthopnea, paroxysmal nocturnal dyspnea and syncope.    Respiratory: Negative for cough and shortness of breath.    Endocrine: Negative.    Hematologic/Lymphatic: Negative for bleeding problem. Does not bruise/bleed easily.   Skin: Negative.    Musculoskeletal: Negative.    Gastrointestinal: Negative for abdominal pain, hematochezia and melena.   Neurological: Negative for focal weakness and light-headedness.        Objective:    Physical Exam   Constitutional: He is oriented to person, place, and time. He appears well-developed and well-nourished. No distress.   HENT:   Head: Normocephalic and atraumatic.   Eyes: Conjunctivae are normal. Right eye exhibits no discharge. Left eye exhibits no discharge.   Neck: Neck supple. No JVD present.   Cardiovascular: Normal rate, regular rhythm and normal heart sounds.  Exam reveals no gallop and no friction rub.    No murmur heard.  Pulmonary/Chest: Effort normal and breath sounds normal. No respiratory distress. He has no wheezes. He has no rales.   Abdominal: Soft. Bowel sounds are normal. He exhibits no distension. There is no tenderness.   Musculoskeletal: He exhibits no edema.   Neurological: He is alert and oriented to person, place, and time.   Skin: Skin is warm and dry. He is not diaphoretic.   Psychiatric: He has a normal mood and affect. His behavior is normal. Judgment and thought content normal.   Vitals reviewed.        Assessment:       1. PAF (paroxysmal atrial fibrillation)    2. Chronic anticoagulation - on apixaban    3. History of stroke    4. LUNA (obstructive sleep apnea)         Plan:       In summary, Mr. Crowell is a pleasant 77 year-old man with prior TIAs/stroke (off anticoagulation), obstructive sleep apnea, and paroxysmal atrial fibrillation intolerant to dronaderone and amiodarone. I had a long discussion with the patient about the pathophysiology and risks of atrial fibrillation and its basic pathophysiology, including its health implications and treatment options with the patient. Specifically, I  addressed the need for indefinite CVA (stroke) prophylaxis with aspirin versus oral anticoagulation (warfarin vs DOACs, discussed bleeding risks, irreversibility of DOACs vs Vitamin K/plasma reversal of warfarin, and need to come to the ER for any head trauma for CT scanning even if asymptomatic). He desires to continue apixaban therapy which he should remain on indefinitely as tolerated.  I also discussed the goal to reduce symptomatic arrhythmic episodes by pharmacologic and/or procedural methods and utilizing a rhythm versus a rate control strategy. I specifically discussed reasoning behind pulmonary vein isolation and how this may be of benefit in reducing the risk of him needing to take anti-arrhythmic drugs.    I offered an alternate anti-arrhythmic drug versus PVI for symptomatic pAF. After our discussion, the patient opted for trial of an alternate anti-arrhythmic. Will give trial of low dose flecainide/metoprolol due to resting heart rate in the low-mid 60's. If he is intolerant to this or has breakthrough AF then he notes he would be more willing to undergo an ablation procedure.      Plan  50mg flecainide and 12.5mg metoprolol both BID  Check ECG 1-2 weeks after initiating medications  Continue eliquis 5mg bid  Return to clinic in 2 months to reassess symptoms    Thank you for allowing me to participate in the care of this patient. Please do not hesitate to call me with any questions or concerns.    Vaughn Crowder MD, PhD  Cardiac Electrophysiology

## 2017-11-01 NOTE — PATIENT INSTRUCTIONS
Understanding Atrial Fibrillation    An arrhythmia is any problem with the speed or pattern of the heartbeat. Atrial fibrillation (AFib) is a common type of arrhythmia. It causes fast, chaotic electrical signals in the atria. This leads to poor functioning of the heart. It also affects how much blood your heart can pump out to the body.  Afib may occur once in a while and go away on its own. Or it may continue for longer periods and need treatment.  AFib can lead to serious problems, such as stroke. Your healthcare provider will need to monitor and manage it.  What happens during atrial fibrillation?   The heart has an electrical system that sends signals to control the heartbeat. As signals move through the heart, they tell the hearts upper chambers (atria) and lower chambers (ventricles) when to squeeze (contract) and relax. This lets blood move through the heart and out to the body and lungs.  With AFib, the atria receive abnormal signals. This causes them to contract in a fast and irregular way, and out of sync with the ventricles. When this happens, the atria also have a harder time moving blood into the ventricles. Blood may then pool in the atria, which increases the risk for blood clots and stroke. The ventricles also may contract too quickly and irregularly. As a result, they may not pump blood to the body and lungs as well as they should. This can weaken the heart muscle over time and cause heart failure.  What causes atrial fibrillation?  AFib is more common in older adults. It has many possible causes including:  · Coronary artery disease  · Heart valve disease  · Heart attack  · Heart surgery  · High blood pressure  · Thyroid disease  · Diabetes  · Lung disease  · Sleep apnea  · Heavy alcohol use  In some cases of AFib, doctors do not know the cause.  What are the symptoms of atrial fibrillation?  AFib may or may not cause symptoms. If symptoms do occur, they may include:  · A fast, pounding,  irregular heartbeat  · Shortness of breath  · Tiredness  · Dizziness or fainting  · Chest pain  How is atrial fibrillation treated?  Treatments for AFib can include any of the options below.  · Medicines. You may be prescribed:  ¨ Heart rate medicines to help slow down the heartbeat  ¨ Heart rhythm medicines to help the heart beat more regularly  ¨ Anti-clotting medicines to help reduce the risk for blood clots and stroke  · Electrical cardioversion. Your healthcare provider uses special pads or paddles to send one or more brief electrical shocks to the heart. This can help reset the heartbeat to normal.  · Ablation. Long, thin tubes called catheters are threaded through a blood vessel to the heart. There, the catheters send out hot or cold energy to the areas causing the abnormal signals. This energy destroys the problem tissue or cells. This improves the chances that your heart will stay in normal rhythm without using medicines. If your heart rate and rhythm cant be controlled, you may need ablation and a pacemaker. These will help control the heart rate and regularity of the heartbeat.  · Surgery. During surgery, your healthcare provider may use different methods to create scar tissue in the areas of the heart causing the abnormal signals. The scar tissue disrupts the abnormal signals and may stop AFib from occurring.  What are the complications of atrial fibrillation?  These can include:  · Blood clots  · Stroke  · Heart failure. This problem occurs when the heart muscle weakens so much that it can no longer pump blood well.  When should I call my healthcare provider?  Call your healthcare provider right away if you have any of these:  · Symptoms that dont get better with treatment, or get worse  · New symptoms   Date Last Reviewed: 5/1/2016  © 8003-0328 [employer]. [address] [place] [address], [place] [address]. All rights reserved. This information is not intended as a substitute for professional  medical care. Always follow your healthcare professional's instructions.

## 2017-11-01 NOTE — LETTER
November 1, 2017      William Peralta MD  [address]  [facility] [address]           [facility] Arr[facility]ia  [address]  [facility] [address]  Phone: [phone]  Fax: [phone]          Patient: Taran Crowell   MR Number: [id number]   YOB: 1940   Date of Visit: 11/1/2017       Dear Dr. William Peralta:    Thank you for referring Taran Crowell to me for evaluation. Attached you will find relevant portions of my assessment and plan of care.    If you have questions, please do not hesitate to call me. I look forward to following Taran Crowell along with you.    Sincerely,    Vaughn Crowder MD    Enclosure  CC:  No Recipients    If you would like to receive this communication electronically, please contact [email or web address] or [phone] to request more information on [email or web address] Link access.    For providers and/or their staff who would like to refer a patient to [facility], please contact us through our one-stop-shop provider referral line, [facility], at [phone].    If you feel you have received this communication in error or would no longer like to receive these types of communications, please e-mail [email or web address]

## 2017-11-02 ENCOUNTER — TELEPHONE (OUTPATIENT)
Dept: INTERNAL MEDICINE | Facility: CLINIC | Age: 77
End: 2017-11-02

## 2017-11-02 NOTE — TELEPHONE ENCOUNTER
----- Message from Ema Chirinos sent at 11/2/2017  2:19 PM CDT -----  Contact: Ena [phone] ref#[id number]  Pharmacy is calling to clarify an RX.  RX name:  mupirocin (BACTROBAN) 2 % ointment  What do they need to clarify:  quantity of how many to give the patient   Comments:

## 2017-11-08 ENCOUNTER — OFFICE VISIT (OUTPATIENT)
Dept: SLEEP MEDICINE | Facility: CLINIC | Age: 77
End: 2017-11-08
Payer: MEDICARE

## 2017-11-08 VITALS
DIASTOLIC BLOOD PRESSURE: 80 MMHG | WEIGHT: 194.88 LBS | HEIGHT: 67 IN | OXYGEN SATURATION: 94 % | SYSTOLIC BLOOD PRESSURE: 122 MMHG | BODY MASS INDEX: 30.59 KG/M2 | HEART RATE: 76 BPM

## 2017-11-08 DIAGNOSIS — I48.0 PAF (PAROXYSMAL ATRIAL FIBRILLATION): ICD-10-CM

## 2017-11-08 DIAGNOSIS — E66.9 OBESITY (BMI 30.0-34.9): ICD-10-CM

## 2017-11-08 DIAGNOSIS — G47.33 OSA (OBSTRUCTIVE SLEEP APNEA): Primary | ICD-10-CM

## 2017-11-08 PROCEDURE — 99999 PR PBB SHADOW E&M-EST. PATIENT-LVL III: CPT | Mod: PBBFAC,,, | Performed by: NURSE PRACTITIONER

## 2017-11-08 PROCEDURE — 99214 OFFICE O/P EST MOD 30 MIN: CPT | Mod: S$PBB,,, | Performed by: NURSE PRACTITIONER

## 2017-11-08 PROCEDURE — 93005 ELECTROCARDIOGRAM TRACING: CPT | Mod: PBBFAC | Performed by: INTERNAL MEDICINE

## 2017-11-08 PROCEDURE — 99213 OFFICE O/P EST LOW 20 MIN: CPT | Mod: PBBFAC | Performed by: NURSE PRACTITIONER
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
## 2017-11-15 ENCOUNTER — HOSPITAL ENCOUNTER (OUTPATIENT)
Dept: CARDIOLOGY | Facility: CLINIC | Age: 77
Discharge: HOME | End: 2017-11-15
Payer: MEDICARE

## 2017-11-15 DIAGNOSIS — I48.0 PAF (PAROXYSMAL ATRIAL FIBRILLATION): Primary | ICD-10-CM

## 2017-11-15 PROCEDURE — 93010 ELECTROCARDIOGRAM REPORT: CPT | Mod: S$PBB,,, | Performed by: INTERNAL MEDICINE

## 2017-11-22 ENCOUNTER — TELEPHONE (OUTPATIENT)
Dept: SPEECH THERAPY | Facility: HOSPITAL | Age: 77
End: 2017-11-22

## 2017-11-22 DIAGNOSIS — K21.9 GASTROESOPHAGEAL REFLUX DISEASE WITHOUT ESOPHAGITIS: Chronic | ICD-10-CM

## 2017-11-22 DIAGNOSIS — Z86.73 HISTORY OF STROKE: ICD-10-CM

## 2017-11-22 DIAGNOSIS — R13.10 DYSPHAGIA, UNSPECIFIED TYPE: Primary | ICD-10-CM

## 2017-11-22 DIAGNOSIS — I63.442 CEREBRAL INFARCTION DUE TO EMBOLISM OF LEFT CEREBELLAR ARTERY: Primary | ICD-10-CM

## 2017-12-07 ENCOUNTER — OFFICE VISIT (OUTPATIENT)
Dept: SLEEP MEDICINE | Facility: CLINIC | Age: 77
End: 2017-12-07
Payer: MEDICARE

## 2017-12-07 VITALS
WEIGHT: 203.38 LBS | OXYGEN SATURATION: 96 % | DIASTOLIC BLOOD PRESSURE: 80 MMHG | SYSTOLIC BLOOD PRESSURE: 126 MMHG | HEIGHT: 67 IN | BODY MASS INDEX: 31.92 KG/M2 | HEART RATE: 56 BPM

## 2017-12-07 DIAGNOSIS — G47.33 OBSTRUCTIVE SLEEP APNEA: Primary | ICD-10-CM

## 2017-12-07 PROCEDURE — 99213 OFFICE O/P EST LOW 20 MIN: CPT | Mod: PBBFAC | Performed by: NURSE PRACTITIONER

## 2017-12-07 PROCEDURE — 99999 PR PBB SHADOW E&M-EST. PATIENT-LVL III: CPT | Mod: PBBFAC,,, | Performed by: NURSE PRACTITIONER

## 2017-12-07 PROCEDURE — 99214 OFFICE O/P EST MOD 30 MIN: CPT | Mod: S$PBB,,, | Performed by: NURSE PRACTITIONER
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
## 2017-12-07 NOTE — PATIENT INSTRUCTIONS
Carolyn or Sachin will contact you to schedule your sleep study. Their number is [phone] (ext 2). The [facility] is located on 7th floor of the [facility].    We will call you when the sleep study results are ready - if you have not heard from us by 2 weeks from the date of the study, please call [phone] (ext 1).    You are advised to abstain from driving should you feel sleepy or drowsy.

## 2017-12-13 ENCOUNTER — TELEPHONE (OUTPATIENT)
Dept: RADIOLOGY | Facility: HOSPITAL | Age: 77
End: 2017-12-13

## 2017-12-14 ENCOUNTER — CLINICAL SUPPORT (OUTPATIENT)
Dept: SPEECH THERAPY | Facility: HOSPITAL | Age: 77
End: 2017-12-14
Payer: MEDICARE

## 2017-12-14 ENCOUNTER — HOSPITAL ENCOUNTER (OUTPATIENT)
Dept: RADIOLOGY | Facility: HOSPITAL | Age: 77
Discharge: HOME | End: 2017-12-14
Attending: NURSE PRACTITIONER
Payer: MEDICARE

## 2017-12-14 ENCOUNTER — OFFICE VISIT (OUTPATIENT)
Dept: GASTROENTEROLOGY | Facility: CLINIC | Age: 77
End: 2017-12-14
Payer: MEDICARE

## 2017-12-14 VITALS
HEART RATE: 49 BPM | BODY MASS INDEX: 31.94 KG/M2 | SYSTOLIC BLOOD PRESSURE: 139 MMHG | HEIGHT: 67 IN | WEIGHT: 203.5 LBS | DIASTOLIC BLOOD PRESSURE: 79 MMHG

## 2017-12-14 DIAGNOSIS — R13.19 ESOPHAGEAL DYSPHAGIA: Primary | ICD-10-CM

## 2017-12-14 DIAGNOSIS — K21.00 GASTROESOPHAGEAL REFLUX DISEASE WITH ESOPHAGITIS: ICD-10-CM

## 2017-12-14 DIAGNOSIS — R13.10 DYSPHAGIA, UNSPECIFIED TYPE: ICD-10-CM

## 2017-12-14 DIAGNOSIS — I63.442 CEREBRAL INFARCTION DUE TO EMBOLISM OF LEFT CEREBELLAR ARTERY: ICD-10-CM

## 2017-12-14 DIAGNOSIS — Z86.73 HISTORY OF STROKE: ICD-10-CM

## 2017-12-14 DIAGNOSIS — K21.9 GASTROESOPHAGEAL REFLUX DISEASE WITHOUT ESOPHAGITIS: Chronic | ICD-10-CM

## 2017-12-14 PROCEDURE — G8998 SWALLOW D/C STATUS: HCPCS | Mod: GN,CI | Performed by: SPEECH-LANGUAGE PATHOLOGIST

## 2017-12-14 PROCEDURE — 99999 PR PBB SHADOW E&M-EST. PATIENT-LVL III: CPT | Mod: PBBFAC,,, | Performed by: NURSE PRACTITIONER

## 2017-12-14 PROCEDURE — 74230 X-RAY XM SWLNG FUNCJ C+: CPT | Mod: TC

## 2017-12-14 PROCEDURE — 74230 X-RAY XM SWLNG FUNCJ C+: CPT | Mod: 26,GC,, | Performed by: RADIOLOGY

## 2017-12-14 PROCEDURE — 99213 OFFICE O/P EST LOW 20 MIN: CPT | Mod: PBBFAC,25 | Performed by: NURSE PRACTITIONER

## 2017-12-14 PROCEDURE — G8997 SWALLOW GOAL STATUS: HCPCS | Mod: GN,CI | Performed by: SPEECH-LANGUAGE PATHOLOGIST

## 2017-12-14 PROCEDURE — G8996 SWALLOW CURRENT STATUS: HCPCS | Mod: GN,CI | Performed by: SPEECH-LANGUAGE PATHOLOGIST

## 2017-12-14 PROCEDURE — 92611 MOTION FLUOROSCOPY/SWALLOW: CPT | Mod: GN | Performed by: SPEECH-LANGUAGE PATHOLOGIST

## 2017-12-14 PROCEDURE — 99214 OFFICE O/P EST MOD 30 MIN: CPT | Mod: S$PBB,,, | Performed by: NURSE PRACTITIONER

## 2017-12-14 NOTE — PROGRESS NOTES
Referring provider: Gayatri Santiago  Reason for visit:  Modified barium swallow study (CPT 71906)    Report Summary   --Date: 12/14/2017  --Purpose: to evaluate anatomy and physiology of the oropharyngeal swallow, to determine effectiveness of rehabilitation strategies, and to determine diet consistency and intervention recommendations.   --Diet recommendations: regular as tolerated    Subjective / History    Taran Crowell is a 77 y.o. male referred by Gayatri Santiago NP for Modified Barium Swallow Study (10380).  He is currently on a regular diet.  He reports intermittent, unpredictable coughing with foods including crackers, white bread, and peanut butter.  He reports he does not cough every time he eats these things; estimates a few times per month.  It feels like the food gets caught in his throat and doesn't go down.  He reports one incident about 6 weeks ago when he was told he needed the Heimlich; he does not recall the details.  He had a CVA on 5/21/17, after which he could not eat for a few days.  He worked with a speech therapist and is now on a regular diet, but this problem of intermittent coughing with solids has persisted.  Denies pill dysphagia.      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 No mass effect or hemorrhage. Additional remote lacunar type infarcts noted in this same region. Decreased signal in the distal right vertebral artery, suggesting hypoplasia or stenosis. The vertebral basilar system is left-sided dominant.    Chronic anticoagulation - on apixaban 5/29/2017    Essential tremor 5/29/2017    On Remeron    GERD (gastroesophageal reflux disease)     History of colon polyps 3/18/2014    Hypothyroidism due  to acquired atrophy of thyroid 09/30/2015    Last on 2015.  None since then.    Memory loss last months.    Mixed hyperlipidemia 5/29/2017    PAF (paroxysmal atrial fibrillation) 6/16/2015     Current Outpatient Prescriptions on File Prior to Visit   Medication Sig Dispense Refill    apixaban 5 mg Tab Take 1 tablet (5 mg total) by mouth 2 (two) times daily. 180 tablet 4    atorvastatin (LIPITOR) 40 MG tablet Take 1 tablet (40 mg total) by mouth once daily. 90 tablet 4    finasteride (PROSCAR) 5 mg tablet Take 1 tablet (5 mg total) by mouth once daily. 30 tablet 11    flecainide (TAMBOCOR) 50 MG Tab Take 1 tablet (50 mg total) by mouth every 12 (twelve) hours. 60 tablet 11    fluticasone (FLONASE) 50 mcg/actuation nasal spray 2 sprays by Each Nare route once daily. 3 Bottle 4    metoprolol tartrate (LOPRESSOR) 25 MG tablet Take 0.5 tablets (12.5 mg total) by mouth 2 (two) times daily. 30 tablet 11    mirtazapine (REMERON) 30 MG tablet Take 1 tablet (30 mg total) by mouth nightly. 90 tablet 4    mupirocin (BACTROBAN) 2 % ointment Apply topically 3 (three) times daily. 15 g 0    omeprazole (PRILOSEC) 40 MG capsule Take 1 capsule (40 mg total) by mouth once daily. 90 capsule 4    tamsulosin (FLOMAX) 0.4 mg Cp24 Take 1 capsule (0.4 mg total) by mouth every evening. 90 capsule 4    [DISCONTINUED] meloxicam (MOBIC) 7.5 MG tablet TAKE 1 TABLET EVERY DAY 20 tablet 0     No current facility-administered medications on file prior to visit.        Objective   Lateral videofluorographic views were obtained. The radiologist and speech pathologist were present for the entire procedure.     Trials administered / method of administration: thin liquid/cup, thin liquid/sequential cup sips, pudding/tsp, cracker and barium tablet    Oral phase  - Grossly WNL with adequate/timely lip closure, tongue control during bolus hold, bolus preparation, and bolus transport/lingual motion.  Little to no oral residue.      Pharyngeal  "phase  - Premature spillage of liquids with delayed initiation to valleculae  - Adequate soft palate elevation  - Mildly reduced laryngeal elevation and anterior hyoid excursion  - Adequate epiglottic movement  - Mildly reduced laryngeal vestibular closure: penetration/trace aspiration of large continuous sips of liquid (<10mL); penetration of residue of thin liquids on puree trial; pt did not make attempts to clear his throat.  Instances of penetration reduced/eliminated with smaller sips.    - Adequate pharyngeal stripping wave  - Adequate PE segment opening  - Mildly reduced tongue base retraction  - Mild-moderate pharyngeal residue: some pooling with thin liquid trials in valleculae/pyriforms after the swallow.  Minimal residue with solids/purees.      Therapeutic interventions to reduce risk for pen/asp/residue: small bites sips / repeat "dry" swallow.  Strategies were effective in decreasing/eliminating risk for penetration of thin liquids and residue.  Provided pt education on safe swallow including eating while sitting upright, taking small sips/bites, using a dry swallow after drinking/eating, and taking his time with eating to ensure complete bolus prep/mastication and controlled bolus size.      Rosenbek Penetration-Aspiration Scale (Rosenbek et al 1996)  Best Trial: 1. Contrast does not enter airway.   Worst Trial: 3. Contrast above TVF with visible residue.     Assessment / Impressions   The results of this MBSS indicate that patient demonstrates mild dysphagia c/b silent penetration of thin liquid consistencies after the swallow.       Recommendations / POC   -Diet: recommend regular as tolerated   -Therapeutic technique: recommend small bites/sips, alternate solid with liquid wash and multiple swallows per bolus   -Contact clinician or referring provider for repeat MBSS if swallowing status changes or worsens    G-Codes for Swallowing  Current status:  - CI  Projected status:   - " CI  Discharge status:   -  CI

## 2017-12-14 NOTE — LETTER
December 16, 2017      Kemi Lopez MD  [address]  [place] [address]           [facility]  [address]  [place] [address]  Phone: [phone]  Fax: [phone]          Patient: Taran Crowell   MR Number: [id number]   YOB: 1940   Date of Visit: 12/14/2017       Dear Dr. Hall:    Thank you for referring Taran Crowell to me for evaluation. Attached you will find relevant portions of my assessment and plan of care.    If you have questions, please do not hesitate to call me. I look forward to following Taran Crowell along with you.    Sincerely,    Ema Ordoñez, NP    Enclosure  CC:  No Recipients    If you would like to receive this communication electronically, please contact [email or web address] or [phone] to request more information on [employer] access.    For providers and/or their staff who would like to refer a patient to [facility], please contact us through our one-stop-shop provider referral line, [facility], at [phone].    If you feel you have received this communication in error or would no longer like to receive these types of communications, please e-mail [email or web address]

## 2017-12-14 NOTE — PROGRESS NOTES
[facility] Note    Reason for Visit:  The primary encounter diagnosis was Esophageal dysphagia. Diagnoses of History of stroke and Gastroesophageal reflux disease with esophagitis were also pertinent to this visit.    PCP:   Kemi Lopez   [address] / [place] [address]    Referring MD:  Kemi Lopez Md  [address] [place] [address]    HPI:  This is a 77 y.o. male here for evaluation of dysphagia. Mr. Crowell is here with his wife who is helping with medical information.     Hx of CVA 5/21/2017 after which he could not eat for a few days. Began working with speech therapist and is currently eating regular diet. Has been taking Eliquis daily since CVA. Intermittent coughing with dry foods specifically crackers, bread, and peanut butter. Does not cough every time he eats solids occuring a few times per month. Feels food get caught in throat and does not go down. Had one episode recently where he was told he needed the Heimlich, pt reports does not remember the event. Wife reports patient constantly clearing throat. Denies painful swallowing, difficulty swallowing liquids or swallowing pills. Worked up with PCP and is scheduled for a modified barium swallow today. Last EGD 2010 and reflux esophagitis present otherwise normal.     Hx of Gerd taking Prilosec 40 mg daily and reflux symptoms controlled. NSAID usage- none.    Normal formed stool daily. Denies abdominal pain, blood in stool, and melena.     ROS:  Constitutional: No fevers, no chills, No unintentional weight loss, no fatigue   ENT: No allergies  CV: No chest pain, no palpitations, no perif. 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    Surgical History:  has a past surgical history that includes Colonoscopy (6/23/2000  ([facility])); Upper gastrointestinal endoscopy (2/7/2007  (Dr. Bourgeois)); Upper gastrointestinal endoscopy (2/12/2010  [facility]); and Colonoscopy w/ polypectomy (2/12/2010  [facility]).    Family History: family history includes Cancer in his father..     Social History:  reports that he has quit smoking. His smoking use included Pipe. He has quit using smokeless tobacco. He reports that he drinks alcohol. He reports that he does not use drugs.    Review of patient's allergies indicates:  No Known Allergies    Current Outpatient Prescriptions   Medication Sig    atorvastatin (LIPITOR) 40 MG tablet Take 1 tablet (40 mg total) by mouth once daily.    finasteride (PROSCAR) 5 mg tablet Take 1 tablet (5 mg total) by mouth once daily.    flecainide (TAMBOCOR) 50 MG Tab Take 1 tablet (50 mg total) by mouth every 12 (twelve) hours.    fluticasone (FLONASE) 50 mcg/actuation nasal spray 2 sprays by Each Nare route once daily.    metoprolol tartrate (LOPRESSOR) 25 MG tablet Take 0.5 tablets (12.5 mg total) by mouth 2 (two) times daily.    mirtazapine (REMERON) 30 MG tablet Take 1 tablet (30 mg total) by mouth nightly.    omeprazole (PRILOSEC) 40 MG capsule Take 1 capsule (40 mg total) by mouth once daily.    tamsulosin (FLOMAX) 0.4 mg Cp24 Take 1 capsule (0.4 mg total) by mouth every  "evening.    apixaban 5 mg Tab Take 1 tablet (5 mg total) by mouth 2 (two) times daily.    mupirocin (BACTROBAN) 2 % ointment Apply topically 3 (three) times daily.     No current facility-administered medications for this visit.      Objective Findings:    Vital Signs:  /79   Pulse (!) 49   Ht 5' 7" (1.702 m)   Wt 92.3 kg (203 lb 7.8 oz)   BMI 31.87 kg/m²   Body mass index is 31.87 kg/m².    Physical Exam:  General Appearance: Well appearing in no acute distress  Head: Normocephalic, without obvious abnormality  Eyes: No scleral icterus, EOMI  ENT: Neck supple, Lips, mucosa, and tongue normal; teeth and gums normal  Lungs: CTA bilaterally in anterior and posterior fields, no wheezes, no crackles.  Heart: Regular rate and rhythm, no murmurs heard  Abdomen: Soft, non tender, non distended with positive bowel sounds in all four quadrants.  Extremities: No clubbing, cyanosis or edema  Skin: No rash to exposed areas  Neurologic: AAOx4    Labs:  Lab Results   Component Value Date    WBC 7.48 10/30/2017    HGB 14.9 10/30/2017    HCT 45.8 10/30/2017     10/30/2017    CHOL 181 10/30/2017    TRIG 57 10/30/2017    HDL 71 10/30/2017    ALT 20 10/30/2017    AST 29 10/30/2017     10/30/2017    K 4.7 10/30/2017     10/30/2017    CREATININE 1.0 10/30/2017    BUN 18 10/30/2017    CO2 28 10/30/2017    TSH 3.826 10/30/2017    PSA 1.6 12/09/2014    INR 1.0 05/22/2017    HGBA1C 5.5 05/22/2017     Endoscopy:    EGD- 2010 and reflux esophagitis present otherwise normal.     Colonoscopy- 2014- Internal hemorrhoids, otherwise normal. Repeat age 80.     Assessment:  1. Esophageal dysphagia    2. History of stroke    3. Gastroesophageal reflux disease with esophagitis      Recommendations:  1. & 2. Continue with modified barium swallow today. After barium swallow will discuss scheduling an EGD and pt understood.   3. Continue taking Prilosec 40 mg daily.      Will follow up pending Barium swallow results. "     Thank you so much for allowing me to participate in the care of JAY JAY Cardenas, FNP-C

## 2017-12-14 NOTE — PATIENT INSTRUCTIONS
"Please call to repeat swallow study if your coughing becomes more frequent.    Safe swallow strategies:   - Eat/drink only while sitting upright  - Taking small sips when drinking - no gulping  - Take small bites when eating, and chew completely before swallowing  - Take a "dry swallow" in between sips when drinking; this helps to clear out any extra liquid from the previous sip.   "

## 2017-12-19 ENCOUNTER — TELEPHONE (OUTPATIENT)
Dept: GASTROENTEROLOGY | Facility: CLINIC | Age: 77
End: 2017-12-19

## 2017-12-19 NOTE — TELEPHONE ENCOUNTER
Called Mr. Crowell to discuss scheduling EGD and dysphagia. Mr. Crowell did not  left voicemail on home and cell phone to call back when available.     JUAN PABLO StaufferC

## 2017-12-20 ENCOUNTER — TELEPHONE (OUTPATIENT)
Dept: SLEEP MEDICINE | Facility: OTHER | Age: 77
End: 2017-12-20

## 2018-01-02 ENCOUNTER — OFFICE VISIT (OUTPATIENT)
Dept: NEUROLOGY | Facility: CLINIC | Age: 78
End: 2018-01-02
Payer: MEDICARE

## 2018-01-02 VITALS
WEIGHT: 203.5 LBS | HEIGHT: 67 IN | BODY MASS INDEX: 31.94 KG/M2 | SYSTOLIC BLOOD PRESSURE: 150 MMHG | HEART RATE: 53 BPM | DIASTOLIC BLOOD PRESSURE: 84 MMHG

## 2018-01-02 DIAGNOSIS — Z86.73 HISTORY OF STROKE: ICD-10-CM

## 2018-01-02 DIAGNOSIS — E78.2 MIXED HYPERLIPIDEMIA: Chronic | ICD-10-CM

## 2018-01-02 DIAGNOSIS — R03.0 ELEVATED BLOOD PRESSURE READING: ICD-10-CM

## 2018-01-02 DIAGNOSIS — G25.0 ESSENTIAL TREMOR: Primary | Chronic | ICD-10-CM

## 2018-01-02 DIAGNOSIS — G47.33 OSA (OBSTRUCTIVE SLEEP APNEA): ICD-10-CM

## 2018-01-02 PROCEDURE — 99213 OFFICE O/P EST LOW 20 MIN: CPT | Mod: PBBFAC | Performed by: PSYCHIATRY & NEUROLOGY

## 2018-01-02 PROCEDURE — 99999 PR PBB SHADOW E&M-EST. PATIENT-LVL III: CPT | Mod: PBBFAC,,, | Performed by: PSYCHIATRY & NEUROLOGY

## 2018-01-02 PROCEDURE — 99214 OFFICE O/P EST MOD 30 MIN: CPT | Mod: S$PBB,,, | Performed by: PSYCHIATRY & NEUROLOGY

## 2018-01-02 NOTE — LETTER
January 2, 2018      Gayatri Santiago, NP  [address]  [place] [address]           [facility]  [address]  [place] [address]  Phone: [phone]          Patient: Taran Crowell   MR Number: [id number]   YOB: 1940   Date of Visit: 1/2/2018       Dear Gayatri Santiago:    Thank you for referring Taran Crowell to me for evaluation. Attached you will find relevant portions of my assessment and plan of care.    If you have questions, please do not hesitate to call me. I look forward to following Taran Crowell along with you.    Sincerely,    Ty Christy MD    Enclosure  CC:  No Recipients    If you would like to receive this communication electronically, please contact [email or web address] or [phone] to request more information on [facility] access.    For providers and/or their staff who would like to refer a patient to [facility], please contact us through our one-stop-shop provider referral line, [facility], at [phone].    If you feel you have received this communication in error or would no longer like to receive these types of communications, please e-mail [email or web address]

## 2018-01-02 NOTE — PROGRESS NOTES
Neurology Clinic Follow-Up Note    Impression:  1. R PICA infarct with excellent recovery.  Likely small embolus from PAF (vs atherothromboembolism from R VA origin)  · Stroke risk factors: stroke, PAF, dyslipidemia, LUNA  2. Elevated BP  3. Essential tremor: stable    Plan:  1. Continue Eliquis  2. Continue atorvastatin 40mg/d; Repeat LFTs and FLP per Dr. Lopez; target LDL <70mg/dL  3. Continue Remeron for tremor  4. Encouraged CPAP  5. Defer f/u of BP to Dr. Lopez; this is the first elevated BP documented  6. RTC 12 months or sooner, prn      Problem List Items Addressed This Visit        1 - High    History of stroke    Overview     R PICA May 2017            2     Essential tremor - Primary (Chronic)    Overview     On Remeron            3     LUNA (obstructive sleep apnea)    Mixed hyperlipidemia (Chronic)    Elevated blood pressure reading            Patient returns for follow-up of above.  He denies recurrent stroke symptoms.  He had a couple falls and choking.  He knows triggers and declines additional speech therapy.  He is exercising.   Sleep study showed severe LUNA.  Using CPAP.   Tremor controlled      Outpatient Prescriptions Marked as Taking for the 1/2/18 encounter (Office Visit) with Ty Christy MD   Medication Sig Dispense Refill    apixaban 5 mg Tab Take 1 tablet (5 mg total) by mouth 2 (two) times daily. 180 tablet 4    atorvastatin (LIPITOR) 40 MG tablet Take 1 tablet (40 mg total) by mouth once daily. 90 tablet 4    finasteride (PROSCAR) 5 mg tablet Take 1 tablet (5 mg total) by mouth once daily. 30 tablet 11    flecainide (TAMBOCOR) 50 MG Tab Take 1 tablet (50 mg total) by mouth every 12 (twelve) hours. 60 tablet 11    fluticasone (FLONASE) 50 mcg/actuation nasal spray 2 sprays by Each Nare route once daily. 3 Bottle 4    metoprolol tartrate (LOPRESSOR) 25 MG tablet Take 0.5 tablets (12.5 mg total) by mouth 2 (two) times daily. 30 tablet 11    mirtazapine (REMERON) 30 MG tablet  "Take 1 tablet (30 mg total) by mouth nightly. 90 tablet 4    mupirocin (BACTROBAN) 2 % ointment Apply topically 3 (three) times daily. 15 g 0    omeprazole (PRILOSEC) 40 MG capsule Take 1 capsule (40 mg total) by mouth once daily. 90 capsule 4    tamsulosin (FLOMAX) 0.4 mg Cp24 Take 1 capsule (0.4 mg total) by mouth every evening. 90 capsule 4       BP (!) 150/84   Pulse (!) 53   Ht 5' 7" (1.702 m)   Wt 92.3 kg (203 lb 7.8 oz)   BMI 31.87 kg/m²    Repeat BP: 137/87  Well-developed, well nourished.  Awake, alert and oriented.  Language is normal.  EOMF without nystagmus, VFF, facial movements intact although there is as very subtle asymmetry of the corners of the mouth (L slightly lower).  No UE drift.  No extinction to double simultaneous tactile stimulation.  Muscle power is normal.  Gait is steady.    Mild postural tremor BUEs    Lab Results   Component Value Date    LDLCALC 98.6 10/30/2017     Ty Christy MD  "

## 2018-01-04 ENCOUNTER — TELEPHONE (OUTPATIENT)
Dept: SLEEP MEDICINE | Facility: CLINIC | Age: 78
End: 2018-01-04

## 2018-01-04 DIAGNOSIS — I48.0 PAF (PAROXYSMAL ATRIAL FIBRILLATION): ICD-10-CM

## 2018-01-04 RX ORDER — METOPROLOL TARTRATE 25 MG/1
12.5 TABLET, FILM COATED ORAL 2 TIMES DAILY
Qty: 30 TABLET | Refills: 11 | Status: SHIPPED | OUTPATIENT
Start: 2018-01-04 | End: 2018-10-28 | Stop reason: SDUPTHER

## 2018-01-04 NOTE — TELEPHONE ENCOUNTER
----- Message from Michelle Finch sent at 1/4/2018 11:42 AM CST -----  _  1st Request  _  2nd Request  _  3rd Request        Who: jasmina jang from [facility]     Why: Requesting a call back in regards the status of a RX they faxed over for your signature fax [phone]  cpap supplies  What Number to Call Back: [phone] opt 4    When to Expect a call back: (Within 24 hours)    Please return the call at earliest convenience. Thanks!

## 2018-01-08 ENCOUNTER — HOSPITAL ENCOUNTER (OUTPATIENT)
Dept: SLEEP MEDICINE | Facility: OTHER | Age: 78
Discharge: HOME | End: 2018-01-08
Attending: NURSE PRACTITIONER
Payer: MEDICARE

## 2018-01-08 DIAGNOSIS — G47.31 COMPLEX SLEEP APNEA SYNDROME: ICD-10-CM

## 2018-01-08 DIAGNOSIS — G47.33 OBSTRUCTIVE SLEEP APNEA: ICD-10-CM

## 2018-01-08 PROCEDURE — 95811 POLYSOM 6/>YRS CPAP 4/> PARM: CPT | Mod: 26,,, | Performed by: INTERNAL MEDICINE

## 2018-01-08 PROCEDURE — 95811 POLYSOM 6/>YRS CPAP 4/> PARM: CPT

## 2018-01-09 NOTE — PROGRESS NOTES
End of The night summary    Type of Study Performed on (GERARDO-JAMES) bipap /ASV    Patient education/cpap information prior to Study/Setup                                    EKG:  Appears to be- NSR                                        Low Spo2 78%                                 Any Difficulties recording: Pt mask leak due to facial feature pt chin causes mask leak, need a special mask fitting for perfect use at home    Optimal pressure# ?     MASK: pt used his own full face Mask he brought tonight, possible pt may have a better compliance with a hybrid mask to help control mask leak, tir to secure leg lead    Pt reaction to CPAP: pt report it was okay comfortable , pt reports he feels like he is sleep cpap pressures  is not bothering him    Tech summary comments:    pt observed titrated on bipap up to 15/10 pt was observed sleeping his side most of the night, tir to ask pt to sleep supine , pt appears to so central sleep due to supine position, switch pt to ASV to observed, pt also has frequent mask leaks due facial features pt maybe do better with a hybrid mask to help control leaks, pt has frequent coughs and movements observed at night, Some improvements observed on ASV supine position but  pt oxygen spo2 observed drops significantly with events , pt may need positional adjustment sleep, pt has a difficult complex sleep apnea with minor improvements

## 2018-01-18 ENCOUNTER — PATIENT MESSAGE (OUTPATIENT)
Dept: SLEEP MEDICINE | Facility: CLINIC | Age: 78
End: 2018-01-18

## 2018-01-22 ENCOUNTER — TELEPHONE (OUTPATIENT)
Dept: SLEEP MEDICINE | Facility: CLINIC | Age: 78
End: 2018-01-22

## 2018-01-22 DIAGNOSIS — G47.31 COMPLEX SLEEP APNEA SYNDROME: Primary | ICD-10-CM

## 2018-01-22 NOTE — TELEPHONE ENCOUNTER
Please notify pt that 01/08/2018 titration study reviewed.     BPAP machine has been ordered. Please advise pt to avoid sleeping on back, if possible, since best control of complex sleep apnea with new BPAP machine measured only when on his sides during recent sleep study.     Pending insurance approval, Teja will contact pt regarding set up. RTC 31-90 days after BPAP .     Please assist with f/u appt.

## 2018-01-25 ENCOUNTER — OFFICE VISIT (OUTPATIENT)
Dept: INTERNAL MEDICINE | Facility: CLINIC | Age: 78
End: 2018-01-25
Payer: MEDICARE

## 2018-01-25 VITALS
BODY MASS INDEX: 32.32 KG/M2 | HEIGHT: 67 IN | DIASTOLIC BLOOD PRESSURE: 80 MMHG | SYSTOLIC BLOOD PRESSURE: 118 MMHG | HEART RATE: 49 BPM | OXYGEN SATURATION: 95 % | WEIGHT: 205.94 LBS

## 2018-01-25 DIAGNOSIS — N40.0 BENIGN NODULAR PROSTATIC HYPERPLASIA: ICD-10-CM

## 2018-01-25 DIAGNOSIS — I48.0 PAF (PAROXYSMAL ATRIAL FIBRILLATION): ICD-10-CM

## 2018-01-25 DIAGNOSIS — K21.9 GASTROESOPHAGEAL REFLUX DISEASE WITHOUT ESOPHAGITIS: Chronic | ICD-10-CM

## 2018-01-25 DIAGNOSIS — E78.2 MIXED HYPERLIPIDEMIA: Chronic | ICD-10-CM

## 2018-01-25 DIAGNOSIS — Z79.01 CHRONIC ANTICOAGULATION: Chronic | ICD-10-CM

## 2018-01-25 DIAGNOSIS — Z86.73 HISTORY OF STROKE: Primary | ICD-10-CM

## 2018-01-25 DIAGNOSIS — G47.31 COMPLEX SLEEP APNEA SYNDROME: ICD-10-CM

## 2018-01-25 PROBLEM — I63.449: Status: RESOLVED | Noted: 2017-05-20 | Resolved: 2018-01-25

## 2018-01-25 PROCEDURE — 99999 PR PBB SHADOW E&M-EST. PATIENT-LVL III: CPT | Mod: PBBFAC,,, | Performed by: INTERNAL MEDICINE

## 2018-01-25 PROCEDURE — 99213 OFFICE O/P EST LOW 20 MIN: CPT | Mod: PBBFAC | Performed by: INTERNAL MEDICINE

## 2018-01-25 PROCEDURE — 99214 OFFICE O/P EST MOD 30 MIN: CPT | Mod: S$PBB,,, | Performed by: INTERNAL MEDICINE

## 2018-01-25 NOTE — PROGRESS NOTES
INTERNAL MEDICINE ESTABLISHED PATIENT VISIT NOTE    Subjective:     Chief Complaint: Follow-up  cholesterol, dysphagia, a-fib     Patient ID: Taran Crowell is a 77 y.o. male with PMHx of embolic CVA in 5/2017 at which time presented c sx of dysarthria, dizziness and facial droop c MRI showing infarct in that time showing R PICA infarct.  Pt also c HLD, PAF on apixaban, BPH, GERD, seasonal allergies, LUNA on cpap, baseline tremor on Remeron by Neuro (says Parkinson's w/u was neg), here for f/u.    Last seen by me in Oct to est care.  Reported baseline intermittent dysphagia and has been evaluated by speech several times as well as in GI clinic by NP in Dec.  Had BaSS which showed:  Deep laryngeal penetration and brief subglottic tracheal aspiration with large volumes of swallowed thin liquids. There was successful clearance after coughing. No aspiration or penetration with small volumes of thin liquids. All other substances swallowed without difficulty. Please see speech pathology report for details.    Was supposed to f/u c GI to discuss EGD but apparently missed their phone call.    Has not had any issues c aspiration at home.    Has also been seen in Sleep clinic and new bpap machine ordered but pt says he didn't know why he was getting a new machine so they had it sent back (missed a phone call from the sleep clinic as well).    Katiana garcia once a week at [place] and lives in [facility] independent living.    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 No mass effect or hemorrhage. Additional remote lacunar type infarcts noted in this same region. Decreased signal in the distal right vertebral artery, suggesting hypoplasia or stenosis. The vertebral  basilar system is left-sided dominant.    Chronic anticoagulation - on apixaban 5/29/2017    Essential tremor 5/29/2017    On Remeron    GERD (gastroesophageal reflux disease)     History of colon polyps 3/18/2014    Hypothyroidism due to acquired atrophy of thyroid 09/30/2015    Last on 2015.  None since then.    Memory loss last months.    Mixed hyperlipidemia 5/29/2017    PAF (paroxysmal atrial fibrillation) 6/16/2015     Medication List with Changes/Refills   Current Medications    APIXABAN 5 MG TAB    Take 1 tablet (5 mg total) by mouth 2 (two) times daily.    ATORVASTATIN (LIPITOR) 40 MG TABLET    Take 1 tablet (40 mg total) by mouth once daily.    FINASTERIDE (PROSCAR) 5 MG TABLET    Take 1 tablet (5 mg total) by mouth once daily.    FLECAINIDE (TAMBOCOR) 50 MG TAB    Take 1 tablet (50 mg total) by mouth every 12 (twelve) hours.    FLUTICASONE (FLONASE) 50 MCG/ACTUATION NASAL SPRAY    2 sprays by Each Nare route once daily.    METOPROLOL TARTRATE (LOPRESSOR) 25 MG TABLET    Take 0.5 tablets (12.5 mg total) by mouth 2 (two) times daily.    MIRTAZAPINE (REMERON) 30 MG TABLET    Take 1 tablet (30 mg total) by mouth nightly.    MUPIROCIN (BACTROBAN) 2 % OINTMENT    Apply topically 3 (three) times daily.    OMEPRAZOLE (PRILOSEC) 40 MG CAPSULE    Take 1 capsule (40 mg total) by mouth once daily.    TAMSULOSIN (FLOMAX) 0.4 MG CP24    Take 1 capsule (0.4 mg total) by mouth every evening.     Review of patient's allergies indicates:  No Known Allergies  Social History     Social History    Marital status:      Spouse name: N/A    Number of children: N/A    Years of education: N/A     Occupational History    retired       retired 1986    lived in [place] x 8 years     teaches criminal law at [employer]      [employer]     Social History Main Topics    Smoking status: Former Smoker     Types: Pipe    Smokeless tobacco: Former User      Comment: smoked pipe regularly once a day but quit 10-15 yrs  "ago    Alcohol use Yes      Comment: social    Drug use: No    Sexual activity: Not on file     Other Topics Concern    Not on file     Social History Narrative        3 children    Retired-        Review of Systems   Constitutional: Negative for chills, fatigue and fever.   Respiratory: Negative for cough, chest tightness and shortness of breath.    Cardiovascular: Negative for chest pain.   Gastrointestinal: Negative for abdominal pain and blood in stool.   Genitourinary: Negative for dysuria and frequency.         Health Maintenance:   Immunizations:   Influ[facility] is [facility] date 9/2017 [facility]  TDap is up to date 5/2017  Pneumovax is up to date.  Pt to bring yellow card of vacc records to see if both given since he forgot today.  Zostavax is UTD.  12/2011     Cancer Screening:  Colonoscopy: is up to date. 3/2014  Int hemorrhoids, diverticula, otherwise normal c rec to repeat in 6-7 years.       Objective:   /80 (BP Location: Left arm, Patient Position: Sitting)   Pulse (!) 49   Ht 5' 7" (1.702 m)   Wt 93.4 kg (205 lb 14.6 oz)   SpO2 95%   BMI 32.25 kg/m²        General: AAO x3, no apparent distress  CV: irregularly irregular c slow rate but upon repeat ascultation was back in regular rhythm  Pulm: Lungs CTAB, no crackles, no wheezes  Abd: s/NT/ND +BS  Extremities: no c/c/e    Labs:     Lab Results   Component Value Date    WBC 7.48 10/30/2017    HGB 14.9 10/30/2017    HCT 45.8 10/30/2017    MCV 96 10/30/2017     10/30/2017      CMP  Sodium   Date Value Ref Range Status   10/30/2017 143 136 - 145 mmol/L Final     Potassium   Date Value Ref Range Status   10/30/2017 4.7 3.5 - 5.1 mmol/L Final     Chloride   Date Value Ref Range Status   10/30/2017 107 95 - 110 mmol/L Final     CO2   Date Value Ref Range Status   10/30/2017 28 23 - 29 mmol/L Final     Glucose   Date Value Ref Range Status   10/30/2017 95 70 - 110 mg/dL Final     BUN, Bld   Date Value Ref Range Status "   10/30/2017 18 8 - 23 mg/dL Final     Creatinine   Date Value Ref Range Status   10/30/2017 1.0 0.5 - 1.4 mg/dL Final     Calcium   Date Value Ref Range Status   10/30/2017 9.4 8.7 - 10.5 mg/dL Final     Total Protein   Date Value Ref Range Status   10/30/2017 6.9 6.0 - 8.4 g/dL Final     Albumin   Date Value Ref Range Status   10/30/2017 3.9 3.5 - 5.2 g/dL Final     Total Bilirubin   Date Value Ref Range Status   10/30/2017 0.8 0.1 - 1.0 mg/dL Final     Comment:     For infants and newborns, interpretation of results should be based  on gestational age, weight and in agreement with clinical  observations.  Premature Infant recommended reference ranges:  Up to 24 hours.............<8.0 mg/dL  Up to 48 hours............<12.0 mg/dL  3-5 days..................<15.0 mg/dL  6-29 days.................<15.0 mg/dL       Alkaline Phosphatase   Date Value Ref Range Status   10/30/2017 46 (L) 55 - 135 U/L Final     AST   Date Value Ref Range Status   10/30/2017 29 10 - 40 U/L Final     ALT   Date Value Ref Range Status   10/30/2017 20 10 - 44 U/L Final     Anion Gap   Date Value Ref Range Status   10/30/2017 8 8 - 16 mmol/L Final     eGFR if    Date Value Ref Range Status   10/30/2017 >60.0 >60 mL/min/1.73 m^2 Final     eGFR if non    Date Value Ref Range Status   10/30/2017 >60.0 >60 mL/min/1.73 m^2 Final     Comment:     Calculation used to obtain the estimated glomerular filtration  rate (eGFR) is the CKD-EPI equation. Since race is unknown   in our information system, the eGFR values for   -American and Non--American patients are given   for each creatinine result.       Lab Results   Component Value Date    LDLCALC 98.6 10/30/2017     Lab Results   Component Value Date    TSH 3.826 10/30/2017     Lab Results   Component Value Date    HGBA1C 5.5 05/22/2017         Assessment/Plan     Taran was seen today for follow-up.    Diagnoses and all orders for this visit:    History of  stroke  Hx embolic stroke, cont secondary prevention c eliquis and bp and lipid control.  Last LDL <100, would rec goal <70, see below.  Regarding results of BaSS,  Advised to take small sips of thin liquids to avoid aspiration but advised to f/u c GI since they were considering EGD.  Phone number on file adjusted since pt no longer uses home number.    Mixed hyperlipidemia  Lab Results   Component Value Date    LDLCALC 98.6 10/30/2017     rec goal <70, repeat labs now and if elevated will rec increasing lipitor from 40 to 80  -     Lipid panel; Future    PAF (paroxysmal atrial fibrillation)  Rate controlled, bradycardic today, was in apparent afib but now back in regular rhythm.  Due for f/u c Dr. Crowder, will help to arrange for pt since meds were adjusted at his last appt.  -     Comprehensive metabolic panel; Future    Benign nodular prostatic hyperplasia  No acute issues, cont meds.    Chronic anticoagulation - on apixaban  As above, cont meds, no issues c bleeding, last h/h wnl    Gastroesophageal reflux disease without esophagitis  Stable, cont meds.    Complex sleep apnea syndrome  As per HPI, was arranged for new bpap machine per sleep clinic notes but pt sent back machine since he did not realize he needed it, advised to message sleep clinic back to set up again.    HM as above  RTC in 4 mos, labs tomorrow when fasting.    Kemi Lopez MD  Department of Internal Medicine - [facility]  01/25/2018

## 2018-01-26 ENCOUNTER — LAB VISIT (OUTPATIENT)
Dept: LAB | Facility: HOSPITAL | Age: 78
End: 2018-01-26
Payer: MEDICARE

## 2018-01-26 DIAGNOSIS — E78.2 MIXED HYPERLIPIDEMIA: Chronic | ICD-10-CM

## 2018-01-26 DIAGNOSIS — I48.0 PAF (PAROXYSMAL ATRIAL FIBRILLATION): ICD-10-CM

## 2018-01-26 LAB
ALBUMIN SERPL BCP-MCNC: 3.9 G/DL
ALP SERPL-CCNC: 41 U/L
ALT SERPL W/O P-5'-P-CCNC: 14 U/L
ANION GAP SERPL CALC-SCNC: 8 MMOL/L
AST SERPL-CCNC: 21 U/L
BILIRUB SERPL-MCNC: 1.2 MG/DL
BUN SERPL-MCNC: 19 MG/DL
CALCIUM SERPL-MCNC: 9.2 MG/DL
CHLORIDE SERPL-SCNC: 108 MMOL/L
CHOLEST SERPL-MCNC: 164 MG/DL
CHOLEST/HDLC SERPL: 2.5 {RATIO}
CO2 SERPL-SCNC: 24 MMOL/L
CREAT SERPL-MCNC: 0.8 MG/DL
EST. GFR  (AFRICAN AMERICAN): >60 ML/MIN/1.73 M^2
EST. GFR  (NON AFRICAN AMERICAN): >60 ML/MIN/1.73 M^2
GLUCOSE SERPL-MCNC: 95 MG/DL
HDLC SERPL-MCNC: 65 MG/DL
HDLC SERPL: 39.6 %
LDLC SERPL CALC-MCNC: 86.2 MG/DL
NONHDLC SERPL-MCNC: 99 MG/DL
POTASSIUM SERPL-SCNC: 4.1 MMOL/L
PROT SERPL-MCNC: 6.4 G/DL
SODIUM SERPL-SCNC: 140 MMOL/L
TRIGL SERPL-MCNC: 64 MG/DL

## 2018-01-26 PROCEDURE — 36415 COLL VENOUS BLD VENIPUNCTURE: CPT

## 2018-01-26 PROCEDURE — 80053 COMPREHEN METABOLIC PANEL: CPT

## 2018-01-26 PROCEDURE — 80061 LIPID PANEL: CPT

## 2018-01-29 ENCOUNTER — PATIENT MESSAGE (OUTPATIENT)
Dept: SLEEP MEDICINE | Facility: CLINIC | Age: 78
End: 2018-01-29

## 2018-01-29 DIAGNOSIS — G47.31 COMPLEX SLEEP APNEA SYNDROME: Primary | ICD-10-CM

## 2018-01-30 ENCOUNTER — OFFICE VISIT (OUTPATIENT)
Dept: ELECTROPHYSIOLOGY | Facility: CLINIC | Age: 78
End: 2018-01-30
Payer: MEDICARE

## 2018-01-30 ENCOUNTER — HOSPITAL ENCOUNTER (OUTPATIENT)
Dept: CARDIOLOGY | Facility: CLINIC | Age: 78
Discharge: HOME | End: 2018-01-30
Payer: MEDICARE

## 2018-01-30 VITALS
SYSTOLIC BLOOD PRESSURE: 116 MMHG | BODY MASS INDEX: 31.49 KG/M2 | DIASTOLIC BLOOD PRESSURE: 74 MMHG | HEIGHT: 67 IN | HEART RATE: 78 BPM | WEIGHT: 200.63 LBS

## 2018-01-30 DIAGNOSIS — Z86.73 HISTORY OF STROKE: ICD-10-CM

## 2018-01-30 DIAGNOSIS — G47.31 COMPLEX SLEEP APNEA SYNDROME: ICD-10-CM

## 2018-01-30 DIAGNOSIS — I48.0 PAF (PAROXYSMAL ATRIAL FIBRILLATION): ICD-10-CM

## 2018-01-30 DIAGNOSIS — I48.0 PAF (PAROXYSMAL ATRIAL FIBRILLATION): Primary | ICD-10-CM

## 2018-01-30 DIAGNOSIS — Z79.01 CHRONIC ANTICOAGULATION: Chronic | ICD-10-CM

## 2018-01-30 PROCEDURE — 93010 ELECTROCARDIOGRAM REPORT: CPT | Mod: S$PBB,,, | Performed by: INTERNAL MEDICINE

## 2018-01-30 PROCEDURE — 93005 ELECTROCARDIOGRAM TRACING: CPT | Mod: PBBFAC | Performed by: INTERNAL MEDICINE

## 2018-01-30 PROCEDURE — 1159F MED LIST DOCD IN RCRD: CPT | Mod: ,,, | Performed by: INTERNAL MEDICINE

## 2018-01-30 PROCEDURE — 99213 OFFICE O/P EST LOW 20 MIN: CPT | Mod: PBBFAC | Performed by: INTERNAL MEDICINE

## 2018-01-30 PROCEDURE — 99214 OFFICE O/P EST MOD 30 MIN: CPT | Mod: S$PBB,,, | Performed by: INTERNAL MEDICINE

## 2018-01-30 PROCEDURE — 1126F AMNT PAIN NOTED NONE PRSNT: CPT | Mod: ,,, | Performed by: INTERNAL MEDICINE

## 2018-01-30 PROCEDURE — 99999 PR PBB SHADOW E&M-EST. PATIENT-LVL III: CPT | Mod: PBBFAC,,, | Performed by: INTERNAL MEDICINE

## 2018-01-30 NOTE — PROGRESS NOTES
Subjective:    Patient ID:  Taran Crowell is a 77 y.o. male who presents for follow-up of Paroxysmal atrial fibrillation    Referring Cardiologist: Irene Peralta MD    HPI  Prior Hx:    I had the pleasure of seeing Mr. Crowell in our electrophysiology clinic today in follow-up for his atrial fibrillation. As you are aware he is a pleasant 77 year-old man with prior TIAs/stroke, obstructive sleep apnea, and paroxysmal atrial fibrillation. He was admitted to [facility] May of 2015 with paroxysmal AF. He was started on amiodarone and eliquis. Shortly after he was switched to multaq due to possible side effects. Multaq was stopped due to concern that he was noticing confusion. He also had a fall at home with bruising and his eliquis was stopped. He did well until May of 2017 when he was admitted to [facility] with right PICA embolic stroke. He was discharged on eliquis. He presented to Dr. Peralta this week with 2 episodes of paroxysmal palpitations. Notes feeling fatigued with racing heart. Each episode lasted ~2 hours. He otherwise has been feeling well. He had an exercise stress echocardiogram in March of 2016 that showed no ischemia. His most recent echocardiogram in May of 2017 noted a preserved LVEF without any significant valvular disease and a normal left atrial volume. At his initial visit we discussed alternated anti-arrhythmic vs PVI. He chose an alternate anti-arrhythmic. We started low-dose flecainide/metoprolol.     I reviewed all electrocardiograms available in [facility]. All show sinus rhythm except for 5/11/2015 which shows atrial fibrillation and rapid ventricular response.    Interim Hx:  The patient came in today because he was at a visit with his PCP recently and was thought to be in afib with ventricular rate of 49 during physical exam but then likely reverted to sinus rhythm at the end of the visit. He was asymptomatic and reports no AF symptoms since starting flecainide. He is  increasing his activity at [facility] [employer] and reports he is doing yoga. He denies any bleeding issues.     My interpretation of today's in clinic electrocardiogram is normal sinus rhythm at 78 bpm with left axis deviation but otherwise normal ECG.    Review of Systems   Constitution: Negative for chills, diaphoresis, fever, weakness and malaise/fatigue.   HENT: Negative.    Eyes: Negative for blurred vision and visual disturbance.   Cardiovascular: Negative for chest pain, dyspnea on exertion, irregular heartbeat, leg swelling, near-syncope, orthopnea, palpitations, paroxysmal nocturnal dyspnea and syncope.   Respiratory: Positive for sleep disturbances due to breathing and snoring. Negative for cough and shortness of breath.    Hematologic/Lymphatic: Negative for bleeding problem. Does not bruise/bleed easily.   Skin: Negative.    Musculoskeletal: Negative.    Gastrointestinal: Negative for hematochezia and melena.   Neurological: Negative for excessive daytime sleepiness, dizziness, focal weakness and light-headedness.        Objective:    Physical Exam   Constitutional: He is oriented to person, place, and time. He appears well-developed and well-nourished. No distress.   HENT:   Head: Normocephalic and atraumatic.   Eyes: Conjunctivae are normal. Right eye exhibits no discharge. Left eye exhibits no discharge.   Neck: Neck supple. No JVD present.   Cardiovascular: Normal rate and regular rhythm.  Exam reveals no gallop and no friction rub.    No murmur heard.  Pulmonary/Chest: Effort normal and breath sounds normal. No respiratory distress. He has no wheezes. He has no rales.   Abdominal: Soft. Bowel sounds are normal. He exhibits no distension. There is no tenderness. There is no rebound.   Musculoskeletal: He exhibits no edema.   Neurological: He is alert and oriented to person, place, and time.   Skin: Skin is warm and dry. He is not diaphoretic. No erythema.   Psychiatric: He has a normal mood and affect. His  behavior is normal. Judgment and thought content normal.   Vitals reviewed.        Assessment:       1. PAF (paroxysmal atrial fibrillation)    2. Chronic anticoagulation - on apixaban    3. Complex sleep apnea syndrome    4. History of stroke         Plan:       In summary, Mr. Crowell is a pleasant 77 year-old man with prior TIAs/stroke (off anticoagulation), obstructive sleep apnea, and paroxysmal atrial fibrillation intolerant to dronaderone and amiodarone. We discussed options at our last visit including alternate anti-arrhythmic vs PVI. He opted for alternate anti-arrhythmic. He is now on anticoagulation and tolerating it. He understands bleeding risks. Recommend 48 hour holter to see if having any recurring significant bradycardia or asymptomatic AF. If unrevealing > ZIO patch  Continue low-dose flecainide/metoprolol for the time being. Continue eliquis as tolerated.    Will discuss results and next steps over the phone. Otherwise RTC in 4 months    Thank you for allowing me to participate in the care of this patient. Please do not hesitate to call me with any questions or concerns.    Vaughn Crowder MD, PhD  Cardiac Electrophysiology

## 2018-02-02 ENCOUNTER — PATIENT MESSAGE (OUTPATIENT)
Dept: SLEEP MEDICINE | Facility: CLINIC | Age: 78
End: 2018-02-02

## 2018-02-03 NOTE — TELEPHONE ENCOUNTER
Bear, Mr. Crowell is trying to get a replacement cushion for his mask, but Teja told him that I need to provide something in order for him to get it. Please contact Teja to see what needs to be sent in.

## 2018-02-07 ENCOUNTER — TELEPHONE (OUTPATIENT)
Dept: SLEEP MEDICINE | Facility: CLINIC | Age: 78
End: 2018-02-07

## 2018-02-07 NOTE — TELEPHONE ENCOUNTER
Receive patient (CMN)Rx from DME   Prescription came with NP Marilia's name instead of Rachel's, so crossed out other provider and replace with Rachel's name than Rachel signed, faxed back to [facility] [place]

## 2018-02-20 ENCOUNTER — TELEPHONE (OUTPATIENT)
Dept: SLEEP MEDICINE | Facility: CLINIC | Age: 78
End: 2018-02-20

## 2018-02-20 ENCOUNTER — PATIENT MESSAGE (OUTPATIENT)
Dept: SLEEP MEDICINE | Facility: CLINIC | Age: 78
End: 2018-02-20

## 2018-02-20 NOTE — TELEPHONE ENCOUNTER
Since this morning, I've been waiting for [facility] to re-fax patient DME Rx, now it's 2:46pm and nothing faxed yet.  [facility] and I was told they [facility] but nothing went through.  I requested that they higinio e mail the order to my employee email, but I was told that might not be possible, the staff had to check with manager.    1. I faxed patient CPAP order  2. Last office visit and  3. Titration study    Hoping that patient order can be process ASAP

## 2018-02-21 ENCOUNTER — TELEPHONE (OUTPATIENT)
Dept: SLEEP MEDICINE | Facility: CLINIC | Age: 78
End: 2018-02-21

## 2018-02-21 NOTE — TELEPHONE ENCOUNTER
----- Message from Urbano Mcgill sent at 2/20/2018  3:36 PM CST -----  Contact: Megan with [facility]  x_ 1st Request   _ 2nd Request   _ 3rd Request     Who: KURT CARRILLO [[id number]]    Why:  Megan missed your call is requesting a [facility].    What Number to Call Back: [phone]    When to Expect a call back: (Before the end of the day)   -- if call after 3:00 call back will be tomorrow.

## 2018-02-21 NOTE — TELEPHONE ENCOUNTER
Returned Kiki' call and she had said that she wasn't sure if patient had a BIPAP prior to current order, because it looked like he returned a BIPAP, so I informed her that patient is getting new BIPAP and requested that she contact patient to verify what patient previously had til now.

## 2018-02-21 NOTE — TELEPHONE ENCOUNTER
Returned call and I previously spoken with Kiki from [facility] about patient issue.  She said that she is working on patient order and patient should receive BIPAP soon.

## 2018-02-21 NOTE — TELEPHONE ENCOUNTER
Spoke with Teja in regards to the status of the patient getting setup with a new machine. Because he was not complaint with his machine Medicare is requiring him to repeat the process.

## 2018-02-21 NOTE — TELEPHONE ENCOUNTER
----- Message from Perri Vo sent at 2/21/2018  8:39 AM CST -----  Contact: elenita with j[facility] [phone]  _  1st Request  _  2nd Request  _  3rd Request        Who: elenita with [facility] [place] [phone]    Why: Requesting a call back in regards to wants to speak to the nurse. Would not say why. Please call elenita    What Number to Call Back:[facility] [phone]    When to Expect a call back: (Within 24 hours)    Please return the call at earliest convenience. Thanks!

## 2018-02-23 ENCOUNTER — CLINICAL SUPPORT (OUTPATIENT)
Dept: ELECTROPHYSIOLOGY | Facility: CLINIC | Age: 78
End: 2018-02-23
Attending: INTERNAL MEDICINE
Payer: MEDICARE

## 2018-02-23 DIAGNOSIS — I48.0 PAF (PAROXYSMAL ATRIAL FIBRILLATION): ICD-10-CM

## 2018-02-23 PROCEDURE — 93226 XTRNL ECG REC<48 HR SCAN A/R: CPT | Mod: PBBFAC | Performed by: INTERNAL MEDICINE

## 2018-02-23 PROCEDURE — 93227 XTRNL ECG REC<48 HR R&I: CPT | Mod: S$PBB,,, | Performed by: INTERNAL MEDICINE

## 2018-02-28 ENCOUNTER — TELEPHONE (OUTPATIENT)
Dept: ELECTROPHYSIOLOGY | Facility: CLINIC | Age: 78
End: 2018-02-28

## 2018-02-28 DIAGNOSIS — I48.0 PAF (PAROXYSMAL ATRIAL FIBRILLATION): Primary | ICD-10-CM

## 2018-02-28 NOTE — TELEPHONE ENCOUNTER
I spoke with patient and informed him that his ZIO patch is covered by his insurance but he will be responsible for $61 of the payment to [facility] not [facility]. Patient stated understanding and wishes to still have it placed.

## 2018-02-28 NOTE — TELEPHONE ENCOUNTER
Called patient and notified him of holter results. No AF or significant bradycardia. Would like to monitor with a 14 day ZIO patch. Will place order and have office staff set-up if approved by insurance.

## 2018-03-06 ENCOUNTER — TELEPHONE (OUTPATIENT)
Dept: SLEEP MEDICINE | Facility: OTHER | Age: 78
End: 2018-03-06

## 2018-03-06 NOTE — TELEPHONE ENCOUNTER
----- Message from Soto Hammond sent at 3/6/2018 10:07 AM CST -----  Contact: Felicity [phone]  Ms.Lucia bernard Lezama is calling to check status of form for cpap supplies . Please call and advise, Thanks

## 2018-03-06 NOTE — TELEPHONE ENCOUNTER
Called Teja and spoke with jenny and she said she will need another signature from Rachel for Certificate medical necessity (DME Rx).  I hope it get processed as I was told it was on 2/21/18 by Jenny herself.    Waiting for CMN fax to be sent    Rachel signed another CMN (DME Rx for patient) and I faxed it back to Teja at [phone] and [phone]  Then sent to be scan

## 2018-03-07 ENCOUNTER — TELEPHONE (OUTPATIENT)
Dept: SLEEP MEDICINE | Facility: CLINIC | Age: 78
End: 2018-03-07

## 2018-03-07 NOTE — TELEPHONE ENCOUNTER
Today [facility] sent another request for CMN signature, so I called and Jenny informed me that pt is schedule to return CPAP today.  CMN signed and faxed to [facility], also re faxed DME rx that was signed 3/6/18 and faxed to [facility]  Then sent doc. To be scan        Me          3/6/18 2:01 PM   Note      Called [facility] and spoke with jenny and she said she will need another signature from Rachel for Certificate medical necessity (DME Rx).  I hope it get processed as I was told it was on 2/21/18 by Jenny herself.     Waiting for CMN fax to be sent     Rachel signed another CMN (DME Rx for patient) and I faxed it back to [facility] at [phone] and [phone]  Then sent to be scan            Me          2/21/18 10:59 AM   Note      Returned call and I previously spoken with Kiki from [facility] about patient issue.  She said that she is working on patient order and patient should receive BIPAP soon.              Me   to [facility]           2/20/18 12:45 PM   request another CMN (DME Rx) refaxed         Me          2/7/18 12:10 PM   Note      Receive patient (CMN)Rx from DME   Prescription came with NP Marilia's name instead of Rachel's, so crossed out other provider and replace with Rachel's name than Rachel signed, faxed back to [facility]            Me   to Taran Crowell           3:21 PM   Your CMN is signed and sent back to [facility].      Last read by Taran Crowell at 7:47 AM on 2/9/2018.           Demi Mcpherson NP   to Ky Ramos Staff           1/22/18 7:09 AM   Please notify pt that 01/08/2018 titration study reviewed.     BPAP machine has been ordered. Please advise pt to avoid sleeping on back, if possible, since best control of complex sleep apnea with new BPAP machine measured only when on his sides during recent sleep study.     Pending insurance approval, [facility] will contact pt regarding set up. RTC 31-90 days after BPAP .     Please assist with f/u appt.

## 2018-04-02 ENCOUNTER — PATIENT MESSAGE (OUTPATIENT)
Dept: INTERNAL MEDICINE | Facility: CLINIC | Age: 78
End: 2018-04-02

## 2018-05-29 ENCOUNTER — OFFICE VISIT (OUTPATIENT)
Dept: INTERNAL MEDICINE | Facility: CLINIC | Age: 78
End: 2018-05-29
Payer: MEDICARE

## 2018-05-29 VITALS
HEART RATE: 56 BPM | SYSTOLIC BLOOD PRESSURE: 120 MMHG | HEIGHT: 67 IN | BODY MASS INDEX: 31.15 KG/M2 | DIASTOLIC BLOOD PRESSURE: 75 MMHG | WEIGHT: 198.44 LBS

## 2018-05-29 DIAGNOSIS — Z79.01 CHRONIC ANTICOAGULATION: Chronic | ICD-10-CM

## 2018-05-29 DIAGNOSIS — K21.9 GASTROESOPHAGEAL REFLUX DISEASE WITHOUT ESOPHAGITIS: Chronic | ICD-10-CM

## 2018-05-29 DIAGNOSIS — G47.31 COMPLEX SLEEP APNEA SYNDROME: ICD-10-CM

## 2018-05-29 DIAGNOSIS — Z86.73 HISTORY OF STROKE: ICD-10-CM

## 2018-05-29 DIAGNOSIS — J30.1 SEASONAL ALLERGIC RHINITIS DUE TO POLLEN: ICD-10-CM

## 2018-05-29 DIAGNOSIS — N40.0 BENIGN NODULAR PROSTATIC HYPERPLASIA: ICD-10-CM

## 2018-05-29 DIAGNOSIS — I48.0 PAF (PAROXYSMAL ATRIAL FIBRILLATION): Primary | ICD-10-CM

## 2018-05-29 DIAGNOSIS — G57.01 PIRIFORMIS SYNDROME OF RIGHT SIDE: ICD-10-CM

## 2018-05-29 DIAGNOSIS — E78.2 MIXED HYPERLIPIDEMIA: Chronic | ICD-10-CM

## 2018-05-29 DIAGNOSIS — G25.0 ESSENTIAL TREMOR: Chronic | ICD-10-CM

## 2018-05-29 PROCEDURE — 99213 OFFICE O/P EST LOW 20 MIN: CPT | Mod: PBBFAC | Performed by: INTERNAL MEDICINE

## 2018-05-29 PROCEDURE — 99214 OFFICE O/P EST MOD 30 MIN: CPT | Mod: S$PBB,,, | Performed by: INTERNAL MEDICINE

## 2018-05-29 PROCEDURE — 99999 PR PBB SHADOW E&M-EST. PATIENT-LVL III: CPT | Mod: PBBFAC,,, | Performed by: INTERNAL MEDICINE

## 2018-05-29 NOTE — PROGRESS NOTES
INTERNAL MEDICINE ESTABLISHED PATIENT VISIT NOTE    Subjective:     Chief Complaint: Follow-up  A fib, cholesterol  C/o R hip pain for several weeks     Patient ID: Taran Crowell is a 78 y.o. male with PMHx of embolic CVA in 5/2017 (at baseline has intermittent dysphagia prev evaluated by speech, denies recent issues).  Pt also c HLD, PAF on apixaban, BPH, GERD, seasonal allergies, LUNA on Bipap, baseline tremor on Remeron by Neuro (says Parkinson's w/u was neg), last seen by me in Jan, here today for routine follow-up.    Today c/o R hip pain, worse when lying flat.  Pain more posterior in sciatic notch.  Worse c waking up in the morning.  Once he gets up and moves around, sx improve.  No radiation down leg.  No lateral or ant pain.    Now sleeps c bipap and sleeping longer and feels more rested in the morning.    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 No mass effect or hemorrhage. Additional remote lacunar type infarcts noted in this same region. Decreased signal in the distal right vertebral artery, suggesting hypoplasia or stenosis. The vertebral basilar system is left-sided dominant.    Chronic anticoagulation - on apixaban 5/29/2017    Essential tremor 5/29/2017    On Remeron    GERD (gastroesophageal reflux disease)     History of colon polyps 3/18/2014    Hypothyroidism due to acquired atrophy of thyroid 09/30/2015    Last on 2015.  None since then.    Memory loss last months.    Mixed hyperlipidemia 5/29/2017    PAF (paroxysmal atrial fibrillation) 6/16/2015       Home Medications:  Prior to Admission medications    Medication Sig Start Date End Date Taking? Authorizing Provider   apixaban 5 mg Tab Take 1  tablet (5 mg total) by mouth 2 (two) times daily. 5/29/17  Yes Juan Pablo Espino MD   atorvastatin (LIPITOR) 40 MG tablet Take 1 tablet (40 mg total) by mouth once daily. 5/29/17  Yes Juan Pablo Espino MD   finasteride (PROSCAR) 5 mg tablet Take 1 tablet (5 mg total) by mouth once daily. 6/5/17 6/5/18 Yes Juan Pablo Espino MD   flecainide (TAMBOCOR) 50 MG Tab Take 1 tablet (50 mg total) by mouth every 12 (twelve) hours. 11/1/17 11/1/18 Yes Vaughn Crowder MD   fluticasone (FLONASE) 50 mcg/actuation nasal spray 2 sprays by Each Nare route once daily. 5/29/17  Yes Juan Pablo Espino MD   metoprolol tartrate (LOPRESSOR) 25 MG tablet Take 0.5 tablets (12.5 mg total) by mouth 2 (two) times daily. 1/4/18 1/4/19 Yes Vaughn Crowder MD   mirtazapine (REMERON) 30 MG tablet Take 1 tablet (30 mg total) by mouth nightly. 5/29/17  Yes Juan Pablo Espino MD   mupirocin (BACTROBAN) 2 % ointment Apply topically 3 (three) times daily. 10/25/17  Yes Kemi Lopez MD   omeprazole (PRILOSEC) 40 MG capsule Take 1 capsule (40 mg total) by mouth once daily. 5/29/17  Yes Juan Pablo Espino MD   tamsulosin (FLOMAX) 0.4 mg Cp24 Take 1 capsule (0.4 mg total) by mouth every evening. 5/29/17  Yes Juan Pablo Espino MD       Allergies:  Review of patient's allergies indicates:  No Known Allergies    Social History:  Social History   Substance Use Topics    Smoking status: Former Smoker     Types: Pipe    Smokeless tobacco: Former User      Comment: smoked pipe regularly once a day but quit 10-15 yrs ago    Alcohol use Yes      Comment: social        Review of Systems   Constitutional: Negative for fever.   Cardiovascular: Negative for chest pain.   Gastrointestinal: Negative for abdominal pain.   Genitourinary: Negative for dysuria and hematuria.   Musculoskeletal: Positive for back pain.   Neurological: Negative for weakness, numbness and headaches.         Health Maintenance:     Immunizations:   Influenza is up to date 9/2017 at [facility]  TDap is up to date 5/2017  Pneumovax  "is up to date.  Pt to bring yellow card of vacc records to see if both given since he forgot again, advised to send via portal.  Zostavax is UTD.  12/2011  Shingrix rec but out of stock today     Cancer Screening:  Colonoscopy: is up to date. 3/2014  Int hemorrhoids, diverticula, otherwise normal c rec to repeat in 6-7 years.    Objective:   /75 (BP Location: Left arm, Patient Position: Sitting, BP Method: Medium (Manual))   Pulse (!) 56   Ht 5' 7" (1.702 m)   Wt 90 kg (198 lb 6.6 oz)   BMI 31.08 kg/m²        General: AAO x3, no apparent distress  CV: slightly bradycardic c occ irregular beats, no m/r/g  Pulm: Lungs CTAB, no crackles, no wheezes  Abd: s/NT/ND +BS  Extremities: trace edema B LE, pitting; slight pain in gluteal area when lying flat    Labs:     Lab Results   Component Value Date    WBC 7.48 10/30/2017    HGB 14.9 10/30/2017    HCT 45.8 10/30/2017    MCV 96 10/30/2017     10/30/2017     CMP  Sodium   Date Value Ref Range Status   01/26/2018 140 136 - 145 mmol/L Final     Potassium   Date Value Ref Range Status   01/26/2018 4.1 3.5 - 5.1 mmol/L Final     Chloride   Date Value Ref Range Status   01/26/2018 108 95 - 110 mmol/L Final     CO2   Date Value Ref Range Status   01/26/2018 24 23 - 29 mmol/L Final     Glucose   Date Value Ref Range Status   01/26/2018 95 70 - 110 mg/dL Final     BUN, Bld   Date Value Ref Range Status   01/26/2018 19 8 - 23 mg/dL Final     Creatinine   Date Value Ref Range Status   01/26/2018 0.8 0.5 - 1.4 mg/dL Final     Calcium   Date Value Ref Range Status   01/26/2018 9.2 8.7 - 10.5 mg/dL Final     Total Protein   Date Value Ref Range Status   01/26/2018 6.4 6.0 - 8.4 g/dL Final     Albumin   Date Value Ref Range Status   01/26/2018 3.9 3.5 - 5.2 g/dL Final     Total Bilirubin   Date Value Ref Range Status   01/26/2018 1.2 (H) 0.1 - 1.0 mg/dL Final     Comment:     For infants and newborns, interpretation of results should be based  on gestational age, weight " and in agreement with clinical  observations.  Premature Infant recommended reference ranges:  Up to 24 hours.............<8.0 mg/dL  Up to 48 hours............<12.0 mg/dL  3-5 days..................<15.0 mg/dL  6-29 days.................<15.0 mg/dL       Alkaline Phosphatase   Date Value Ref Range Status   01/26/2018 41 (L) 55 - 135 U/L Final     AST   Date Value Ref Range Status   01/26/2018 21 10 - 40 U/L Final     ALT   Date Value Ref Range Status   01/26/2018 14 10 - 44 U/L Final     Anion Gap   Date Value Ref Range Status   01/26/2018 8 8 - 16 mmol/L Final     eGFR if    Date Value Ref Range Status   01/26/2018 >60 >60 mL/min/1.73 m^2 Final     eGFR if non    Date Value Ref Range Status   01/26/2018 >60 >60 mL/min/1.73 m^2 Final     Comment:     Calculation used to obtain the estimated glomerular filtration  rate (eGFR) is the CKD-EPI equation.        Lab Results   Component Value Date    LDLCALC 86.2 01/26/2018     Lab Results   Component Value Date    TSH 3.826 10/30/2017     Lab Results   Component Value Date    IRON 88 10/30/2017    TIBC 367 10/30/2017    FERRITIN 27 10/30/2017     Lab Results   Component Value Date    HGBA1C 5.5 05/22/2017         Assessment/Plan     Taran was seen today for follow-up.    Diagnoses and all orders for this visit:    PAF (paroxysmal atrial fibrillation)  Chronic anticoagulation - on apixaban  Rate/rhythm controlled on flecainide and lopressor, had recent holter c no AF.  Cont apixaban for stroke prevention, has f/u c Cards in July    Mixed hyperlipidemia  Lab Results   Component Value Date    LDLCALC 86.2 01/26/2018     Close to goal on Lipitor 40, will cont meds, consider increase to 80 in future pending next lab draw.    Benign nodular prostatic hyperplasia  Stable on flomax  Cont meds    Gastroesophageal reflux disease without esophagitis  Stable on prilosec, cont meds    Seasonal allergic rhinitis due to pollen  Stable on  flonase    History of stroke  As above, cont meds for secondary prevention    Essential tremor  No issues, seen by neuro in the past who has pt on Remeron    Complex sleep apnea syndrome  Stable, on bipap now, requesting f/u in sleep clinic, number given    Piriformis syndrome of right side  -     Ambulatory Referral to Orthopedics     as above  RTC in 6 mos for f/u, labs at f/u    Kemi Lopez MD  Department of Internal Medicine - [facility] [address]  05/29/2018  Answers for HPI/ROS submitted by the patient on 5/28/2018   Back pain  Chronicity: chronic  Frequency: daily  Progression since onset: waxing and waning  Pain quality: aching  Radiates to: does not radiate  Pain - numeric: 5/10  Aggravated by: standing  Stiffness is present: in the morning  bladder incontinence: No  bowel incontinence: No  leg pain: No  paresis: No  paresthesias: No  pelvic pain: No  perianal numbness: No  tingling: No  weight loss: No  genital pain: No  Risk factors: obesity  Pain severity: moderate  Treatments tried: injection treatment  Improvement on treatment: moderate

## 2018-05-30 ENCOUNTER — TELEPHONE (OUTPATIENT)
Dept: SLEEP MEDICINE | Facility: CLINIC | Age: 78
End: 2018-05-30

## 2018-05-30 NOTE — TELEPHONE ENCOUNTER
----- Message from Yoana Delaney sent at 5/30/2018 11:35 AM CDT -----  Contact: Heather  Name of Who is Calling: Heather Lezama       What is the request in detail:Heather Lezama states she would like to get the patients chart notes stating that the patient is using and benefiting from the BPAP machine faxed to [phone]............ Please contact to further discuss and advise       Can the clinic reply by [facility]: No      What Number [facility] Back [facility]: [phone]

## 2018-06-01 ENCOUNTER — TELEPHONE (OUTPATIENT)
Dept: SLEEP MEDICINE | Facility: CLINIC | Age: 78
End: 2018-06-01

## 2018-06-01 NOTE — TELEPHONE ENCOUNTER
I returned Heather's called to let her know that I already faxed those documents that she requested for the patient and after phone rang someone picked up and hung up.

## 2018-06-01 NOTE — TELEPHONE ENCOUNTER
----- Message from Rosa Angeles sent at 6/1/2018 11:26 AM CDT -----  Contact: Teja  Name of Who is Calling: KURT CARRILLO [[id number]]     What is the request in detail: Heather would like recent chart notes for Bpap machine stating that the patient is using and benefiting from the BPAP machine faxed to [phone] . Please call      Can the clinic reply by [facility]: no     What Number [facility]: [phone]

## 2018-06-01 NOTE — TELEPHONE ENCOUNTER
----- Message from Megan Gonzales sent at 6/1/2018 10:19 AM CDT -----  Contact: KURT CARRILLO [[id number]]            Name of Who is Calling: KURT CARRILLO [[id number]]    What is the request in detail: Heather would like recent chart notes for Bpap machine stating that the patient is using and benefiting from the BPAP machine faxed to [phone] . Please call     Can the clinic reply by [facility]: no    What [facility]: [phone]

## 2018-06-01 NOTE — TELEPHONE ENCOUNTER
Called Teja back; trevin received the documents.  She stated patient needs BIPAP f/u, so I call patient and schedule a f/u for 6/7 at 9am

## 2018-06-04 ENCOUNTER — TELEPHONE (OUTPATIENT)
Dept: ORTHOPEDICS | Facility: CLINIC | Age: 78
End: 2018-06-04

## 2018-06-04 DIAGNOSIS — M54.50 LUMBAR SPINE PAIN: Primary | ICD-10-CM

## 2018-06-07 ENCOUNTER — OFFICE VISIT (OUTPATIENT)
Dept: SLEEP MEDICINE | Facility: CLINIC | Age: 78
End: 2018-06-07
Payer: MEDICARE

## 2018-06-07 VITALS
HEIGHT: 67 IN | HEART RATE: 68 BPM | BODY MASS INDEX: 31.7 KG/M2 | DIASTOLIC BLOOD PRESSURE: 84 MMHG | SYSTOLIC BLOOD PRESSURE: 118 MMHG | WEIGHT: 201.94 LBS

## 2018-06-07 DIAGNOSIS — G47.31 COMPLEX SLEEP APNEA SYNDROME: Primary | ICD-10-CM

## 2018-06-07 PROCEDURE — 99213 OFFICE O/P EST LOW 20 MIN: CPT | Mod: PBBFAC | Performed by: NURSE PRACTITIONER

## 2018-06-07 PROCEDURE — 99999 PR PBB SHADOW E&M-EST. PATIENT-LVL III: CPT | Mod: PBBFAC,,, | Performed by: NURSE PRACTITIONER

## 2018-06-07 PROCEDURE — 99214 OFFICE O/P EST MOD 30 MIN: CPT | Mod: S$PBB,,, | Performed by: NURSE PRACTITIONER
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
## 2018-06-08 ENCOUNTER — HOSPITAL ENCOUNTER (OUTPATIENT)
Dept: RADIOLOGY | Facility: HOSPITAL | Age: 78
Discharge: HOME | End: 2018-06-08
Attending: PHYSICIAN ASSISTANT
Payer: MEDICARE

## 2018-06-08 ENCOUNTER — OFFICE VISIT (OUTPATIENT)
Dept: ORTHOPEDICS | Facility: CLINIC | Age: 78
End: 2018-06-08
Payer: MEDICARE

## 2018-06-08 ENCOUNTER — TELEPHONE (OUTPATIENT)
Dept: SLEEP MEDICINE | Facility: CLINIC | Age: 78
End: 2018-06-08

## 2018-06-08 VITALS — HEIGHT: 67 IN | BODY MASS INDEX: 31.33 KG/M2 | WEIGHT: 199.63 LBS

## 2018-06-08 DIAGNOSIS — M54.50 LUMBAR SPINE PAIN: ICD-10-CM

## 2018-06-08 DIAGNOSIS — M51.36 DDD (DEGENERATIVE DISC DISEASE), LUMBAR: Primary | ICD-10-CM

## 2018-06-08 PROCEDURE — 72100 X-RAY EXAM L-S SPINE 2/3 VWS: CPT | Mod: TC

## 2018-06-08 PROCEDURE — 99204 OFFICE O/P NEW MOD 45 MIN: CPT | Mod: S$PBB,,, | Performed by: PHYSICIAN ASSISTANT

## 2018-06-08 PROCEDURE — 99999 PR PBB SHADOW E&M-EST. PATIENT-LVL III: CPT | Mod: PBBFAC,,, | Performed by: PHYSICIAN ASSISTANT

## 2018-06-08 PROCEDURE — 72120 X-RAY BEND ONLY L-S SPINE: CPT | Mod: 26,,, | Performed by: RADIOLOGY

## 2018-06-08 PROCEDURE — 72100 X-RAY EXAM L-S SPINE 2/3 VWS: CPT | Mod: 26,,, | Performed by: RADIOLOGY

## 2018-06-08 PROCEDURE — 99213 OFFICE O/P EST LOW 20 MIN: CPT | Mod: PBBFAC,25 | Performed by: PHYSICIAN ASSISTANT

## 2018-06-08 NOTE — PROGRESS NOTES
DATE: 6/8/2018  PATIENT: Taran Crowell    Supervising Physician: Vaughn Nelson M.D.    CHIEF COMPLAINT: back pain    HISTORY:  Taran Crowell is a 78 y.o. male here for initial evaluation of low back pain (Back - 3-6/10). The pain has been present for about a year. The patient describes the pain as consistent and aching.  The pain is worse with lying flat, getting up from sitting, first thing in the morning and getting out of bed and improved by bending and with activity. There is no associated numbness and tingling. There is no subjective weakness. Prior treatments have included aleve and home exercises, but no physical therapy, ESIs or surgery.  He lives at [facility] and is very active.  He participates in exercise classes 5 days a week.    The patient denies myelopathic symptoms such as handwriting changes or difficulty with buttons/coins/keys. Denies perineal paresthesias, bowel/bladder dysfunction.    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 No mass effect or hemorrhage. Additional remote lacunar type infarcts noted in this same region. Decreased signal in the distal right vertebral artery, suggesting hypoplasia or stenosis. The vertebral basilar system is left-sided dominant.    Chronic anticoagulation - on apixaban 5/29/2017    Essential tremor 5/29/2017    On Remeron    GERD (gastroesophageal reflux disease)     History of colon polyps 3/18/2014    Hypothyroidism due to acquired atrophy of thyroid 09/30/2015    Last on 2015.  None since then.    Memory loss last months.    Mixed hyperlipidemia 5/29/2017    PAF (paroxysmal atrial fibrillation) 6/16/2015     Past Surgical History:   Procedure  Laterality Date    COLONOSCOPY  6/23/2000  ([place])    Internal hemorrhoids, otherwise normal exam.    COLONOSCOPY W/ POLYPECTOMY  2/12/2010  [facility]    One less than 1 mm polyp in the distal sigmoid.  TUBULAR ADENOMA.    One less than 1 mm polyp in the cecum.  TUBULAR ADENOMA.    Non-bleeding internal hemorrhoids.       UPPER GASTROINTESTINAL ENDOSCOPY  2/7/2007  (Dr. Bourgeois)    Grade 1 reflux esophagitis.  Small/medium hiatal hernia.  Pylorus erythema.    UPPER GASTROINTESTINAL ENDOSCOPY  2/12/2010  [facility]    Slight reflux esophagitis.  Z-line regular, 30 cm from the incisors.   Moderate / large hiatus hernia.  Normal stomach and duodenum.  SELAM Test  Negative.        Current Medications:   Current Outpatient Prescriptions:     apixaban 5 mg Tab, Take 1 tablet (5 mg total) by mouth 2 (two) times daily., Disp: 180 tablet, Rfl: 4    atorvastatin (LIPITOR) 40 MG tablet, Take 1 tablet (40 mg total) by mouth once daily., Disp: 90 tablet, Rfl: 4    finasteride (PROSCAR) 5 mg tablet, Take 1 tablet (5 mg total) by mouth once daily., Disp: 30 tablet, Rfl: 11    flecainide (TAMBOCOR) 50 MG Tab, Take 1 tablet (50 mg total) by mouth every 12 (twelve) hours., Disp: 60 tablet, Rfl: 11    fluticasone (FLONASE) 50 mcg/actuation nasal spray, 2 sprays by Each Nare route once daily., Disp: 3 Bottle, Rfl: 4    metoprolol tartrate (LOPRESSOR) 25 MG tablet, Take 0.5 tablets (12.5 mg total) by mouth 2 (two) times daily., Disp: 30 tablet, Rfl: 11    mirtazapine (REMERON) 30 MG tablet, Take 1 tablet (30 mg total) by mouth nightly., Disp: 90 tablet, Rfl: 4    mupirocin (BACTROBAN) 2 % ointment, Apply topically 3 (three) times daily., Disp: 15 g, Rfl: 0    omeprazole (PRILOSEC) 40 MG capsule, Take 1 capsule (40 mg total) by mouth once daily., Disp: 90 capsule, Rfl: 4    tamsulosin (FLOMAX) 0.4 mg Cp24, Take 1 capsule (0.4 mg total) by mouth every evening., Disp: 90 capsule, Rfl: 4    Social History:   Social History     Social  "History    Marital status:      Spouse name: N/A    Number of children: N/A    Years of education: N/A     Occupational History    retired       retired 1986    lived in [place] x 8 years     teaches criminal law at [employer]     Social History Main Topics    Smoking status: Former Smoker     Types: Pipe    Smokeless tobacco: Former User      Comment: smoked pipe regularly once a day but quit 10-15 yrs ago    Alcohol use Yes      Comment: social    Drug use: No    Sexual activity: Not on file     Other Topics Concern    Not on file     Social History Narrative        3 children    Retired-       REVIEW OF SYSTEMS:  Constitution: Negative. Negative for chills, fever and night sweats.   Cardiovascular: Negative for chest pain and syncope.   Respiratory: Negative for cough and shortness of breath.   Gastrointestinal: See HPI. Negative for nausea/vomiting. Negative for abdominal pain.  Genitourinary: See HPI. Negative for discoloration or dysuria.  Skin: Negative for dry skin, itching and rash.   Hematologic/Lymphatic: Negative for bleeding problem. Does not bruise/bleed easily.   Musculoskeletal: Negative for falls and muscle weakness.   Neurological: See HPI. No seizures.   Endocrine: Negative for polydipsia, polyphagia and polyuria.   Allergic/Immunologic: Negative for hives and persistent infections.    PHYSICAL EXAMINATION:    Ht 5' 7" (1.702 m)   Wt 90.5 kg (199 lb 10 oz)   BMI 31.27 kg/m²     General: The patient is a very pleasant 78 y.o. male in no apparent distress, the patient is oriented to person, place and time.   Psych: Normal mood and affect  HEENT: Vision grossly intact, hearing intact to the spoken word.  Lungs: Respirations unlabored.  Gait: Normal station and gait, no difficulty with toe or heel walk.   Skin: Dorsal lumbar skin negative for rashes, lesions, hairy patches and surgical scars.  Range of motion: Lumbar range of motion is acceptable. " There is mild lumbar tenderness to palpation.  Spinal Balance: Global saggital and coronal spinal balance acceptable, no significant for scoliosis and kyphosis.  Musculoskeletal: No pain with the range of motion of the bilateral hips. No trochanteric tenderness to palpation.  Vascular: Bilateral lower extremities warm and well perfused, Dorsalis pedis pulses 2+ bilaterally.  Neurological: Normal strength and tone in all major motor groups in the bilateral lower extremities. Normal sensation to light touch in the L2-S1 dermatomes bilaterally.  Deep tendon reflexes symmetric 1+ in the bilateral lower extremities.  Negative Babinski bilaterally.  Straight leg raise negative bilaterally.     IMAGING:   Today I personally reviewed AP, Lat and Flex/Ex upright L-spine films that demonstrate severe degenerative changes from T12 - L2.  There is mild to moderate degenerative changes of the remaining lumbar spine.       ASSESSMENT/PLAN:    Diagnoses and all orders for this visit:    DDD (degenerative disc disease), lumbar        The patient is having back pain without radicular or myelopathic symptoms.  He has not had any treatment.  He will begin taking tylenol regularly as directed on the bottle.  He will continue his exercise program.  He does not want to go to outpatient therapy.  He will follow up in 4-6 weeks if symptoms persist.       Follow-up if symptoms worsen or fail to improve.

## 2018-06-08 NOTE — LETTER
June 8, 2018      Kemi Lopez MD  [address]  [place] [address]           [facility]  [address]  [place] [address]  Phone: [phone]          Patient: Taran Crowell   MR Number: [id number]   YOB: 1940   Date of Visit: 6/8/2018       Dear Dr. Hall:    Thank you for referring Taran Crowell to me for evaluation. Attached you will find relevant portions of my assessment and plan of care.    If you have questions, please do not hesitate to call me. I look forward to following Taran Crowell along with you.    Sincerely,    Karen Anguiano PA-C    Enclosure  CC:  No Recipients    If you would like to receive this communication electronically, please contact [email or web address] or [phone] to request more information on [employer] Link access.    For providers and/or their staff who would like to refer a patient to [facility], please contact us through our one-stop-shop provider referral line, [facility], at [phone].    If you feel you have received this communication in error or would no longer like to receive these types of communications, please e-mail [email or web address]

## 2018-06-12 ENCOUNTER — TELEPHONE (OUTPATIENT)
Dept: SLEEP MEDICINE | Facility: CLINIC | Age: 78
End: 2018-06-12

## 2018-06-12 NOTE — TELEPHONE ENCOUNTER
----- Message from Megan Gonzales sent at 6/12/2018 10:47 AM CDT -----  Contact: Heather from [facility]            Name of Who is Calling: Heather from [facility]      What is the request in detail: Heather would like chart notes for BiPap machine faxed to [phone]. Please call     Can the clinic reply by [facility]: no      [facility] [phone]

## 2018-06-14 ENCOUNTER — PATIENT MESSAGE (OUTPATIENT)
Dept: ELECTROPHYSIOLOGY | Facility: CLINIC | Age: 78
End: 2018-06-14

## 2018-06-20 ENCOUNTER — CLINICAL SUPPORT (OUTPATIENT)
Dept: ELECTROPHYSIOLOGY | Facility: CLINIC | Age: 78
End: 2018-06-20
Attending: INTERNAL MEDICINE
Payer: MEDICARE

## 2018-06-20 DIAGNOSIS — I48.0 PAF (PAROXYSMAL ATRIAL FIBRILLATION): ICD-10-CM

## 2018-06-20 PROCEDURE — 0298T HOLTER MONITOR - 3-14 DAY ADULT: CPT | Mod: ,,, | Performed by: INTERNAL MEDICINE

## 2018-06-20 PROCEDURE — 0296T HOLTER MONITOR - 3-14 DAY ADULT: CPT | Mod: PBBFAC | Performed by: INTERNAL MEDICINE

## 2018-07-09 DIAGNOSIS — G25.0 ESSENTIAL TREMOR: Chronic | ICD-10-CM

## 2018-07-09 RX ORDER — MIRTAZAPINE 30 MG/1
30 TABLET, FILM COATED ORAL NIGHTLY
Qty: 90 TABLET | Refills: 3 | Status: SHIPPED | OUTPATIENT
Start: 2018-07-09 | End: 2018-12-05 | Stop reason: SDUPTHER

## 2018-07-16 RX ORDER — TAMSULOSIN HYDROCHLORIDE 0.4 MG/1
0.4 CAPSULE ORAL NIGHTLY
Qty: 90 CAPSULE | Refills: 3 | Status: SHIPPED | OUTPATIENT
Start: 2018-07-16 | End: 2019-01-01

## 2018-07-17 ENCOUNTER — OFFICE VISIT (OUTPATIENT)
Dept: ELECTROPHYSIOLOGY | Facility: CLINIC | Age: 78
End: 2018-07-17
Payer: MEDICARE

## 2018-07-17 ENCOUNTER — LAB VISIT (OUTPATIENT)
Dept: LAB | Facility: HOSPITAL | Age: 78
End: 2018-07-17
Attending: INTERNAL MEDICINE
Payer: MEDICARE

## 2018-07-17 ENCOUNTER — HOSPITAL ENCOUNTER (OUTPATIENT)
Dept: CARDIOLOGY | Facility: CLINIC | Age: 78
Discharge: HOME | End: 2018-07-17
Payer: MEDICARE

## 2018-07-17 VITALS
BODY MASS INDEX: 31.31 KG/M2 | HEIGHT: 67 IN | WEIGHT: 199.5 LBS | DIASTOLIC BLOOD PRESSURE: 64 MMHG | OXYGEN SATURATION: 95 % | HEART RATE: 52 BPM | SYSTOLIC BLOOD PRESSURE: 117 MMHG

## 2018-07-17 DIAGNOSIS — G47.31 COMPLEX SLEEP APNEA SYNDROME: ICD-10-CM

## 2018-07-17 DIAGNOSIS — Z86.73 HISTORY OF STROKE: ICD-10-CM

## 2018-07-17 DIAGNOSIS — Z79.01 CHRONIC ANTICOAGULATION: Chronic | ICD-10-CM

## 2018-07-17 DIAGNOSIS — I48.0 PAF (PAROXYSMAL ATRIAL FIBRILLATION): ICD-10-CM

## 2018-07-17 DIAGNOSIS — I48.0 PAF (PAROXYSMAL ATRIAL FIBRILLATION): Primary | ICD-10-CM

## 2018-07-17 LAB
ANION GAP SERPL CALC-SCNC: 8 MMOL/L
BUN SERPL-MCNC: 20 MG/DL
CALCIUM SERPL-MCNC: 9.9 MG/DL
CHLORIDE SERPL-SCNC: 104 MMOL/L
CO2 SERPL-SCNC: 26 MMOL/L
CREAT SERPL-MCNC: 1 MG/DL
ERYTHROCYTE [DISTWIDTH] IN BLOOD BY AUTOMATED COUNT: 14.3 %
EST. GFR  (AFRICAN AMERICAN): >60 ML/MIN/1.73 M^2
EST. GFR  (NON AFRICAN AMERICAN): >60 ML/MIN/1.73 M^2
GLUCOSE SERPL-MCNC: 97 MG/DL
HCT VFR BLD AUTO: 43.9 %
HGB BLD-MCNC: 14.4 G/DL
MCH RBC QN AUTO: 32 PG
MCHC RBC AUTO-ENTMCNC: 32.8 G/DL
MCV RBC AUTO: 98 FL
PLATELET # BLD AUTO: 237 K/UL
PMV BLD AUTO: 11.1 FL
POTASSIUM SERPL-SCNC: 4.4 MMOL/L
RBC # BLD AUTO: 4.5 M/UL
SODIUM SERPL-SCNC: 138 MMOL/L
WBC # BLD AUTO: 8.34 K/UL

## 2018-07-17 PROCEDURE — 36415 COLL VENOUS BLD VENIPUNCTURE: CPT

## 2018-07-17 PROCEDURE — 99213 OFFICE O/P EST LOW 20 MIN: CPT | Mod: PBBFAC | Performed by: INTERNAL MEDICINE

## 2018-07-17 PROCEDURE — 80048 BASIC METABOLIC PNL TOTAL CA: CPT

## 2018-07-17 PROCEDURE — 85027 COMPLETE CBC AUTOMATED: CPT

## 2018-07-17 PROCEDURE — 93005 ELECTROCARDIOGRAM TRACING: CPT | Mod: PBBFAC | Performed by: INTERNAL MEDICINE

## 2018-07-17 PROCEDURE — 99999 PR PBB SHADOW E&M-EST. PATIENT-LVL III: CPT | Mod: PBBFAC,,, | Performed by: INTERNAL MEDICINE

## 2018-07-17 PROCEDURE — 99213 OFFICE O/P EST LOW 20 MIN: CPT | Mod: S$PBB,,, | Performed by: INTERNAL MEDICINE

## 2018-07-17 PROCEDURE — 93010 ELECTROCARDIOGRAM REPORT: CPT | Mod: S$PBB,,, | Performed by: INTERNAL MEDICINE

## 2018-07-17 NOTE — PROGRESS NOTES
Subjective:    Patient ID:  Taran Crowell is a 78 y.o. male who presents for follow-up of Atrial Fibrillation    Referring Cardiologist: Irene Peralta MD    HPI  Prior Hx:     I had the pleasure of seeing Mr. Crowell in our electrophysiology clinic today in follow-up for his atrial fibrillation. As you are aware he is a pleasant 77 year-old man with prior TIAs/stroke, obstructive sleep apnea, and paroxysmal atrial fibrillation. He was admitted to [facility] May of 2015 with paroxysmal AF. He was started on amiodarone and eliquis. Shortly after he was switched to multaq due to possible side effects. Multaq was stopped due to concern that he was noticing confusion. He also had a fall at home with bruising and his eliquis was stopped. He did well until May of 2017 when he was admitted to [facility] with right PICA embolic stroke. He was discharged on eliquis. He presented to Dr. Peralta this week with 2 episodes of paroxysmal palpitations. Notes feeling fatigued with racing heart. Each episode lasted ~2 hours. He otherwise has been feeling well. He had an exercise stress echocardiogram in March of 2016 that showed no ischemia. His most recent echocardiogram in May of 2017 noted a preserved LVEF without any significant valvular disease and a normal left atrial volume. At his initial visit we discussed alternated anti-arrhythmic vs PVI. He chose an alternate anti-arrhythmic. We started low-dose flecainide/metoprolol.     I reviewed all electrocardiograms available in [facility]. All show sinus rhythm except for 5/11/2015 which shows atrial fibrillation and rapid ventricular response.     The patient came for visit 1/2018 because he was at a visit with his PCP recently and was thought to be in afib with ventricular rate of 49 during physical exam but then likely reverted to sinus rhythm at the end of the visit. He was asymptomatic and reports no AF symptoms since starting flecainide.     Interim Hx:  Feels well.  ZIO patch with occasional nonsustained AT and 1 episode of nonsustained short RP tach. No bleeding issues.       My interpretation of today's in clinic electrocardiogram is normal sinus rhythm at 51 bpm    Review of Systems   Constitution: Negative for fever, weakness and malaise/fatigue.   HENT: Negative for congestion and sore throat.    Eyes: Negative for blurred vision and visual disturbance.   Cardiovascular: Negative for chest pain, dyspnea on exertion, irregular heartbeat, near-syncope, palpitations and syncope.   Respiratory: Negative for cough and shortness of breath.    Hematologic/Lymphatic: Negative for bleeding problem. Does not bruise/bleed easily.   Skin: Negative.    Musculoskeletal: Positive for arthritis and back pain.   Gastrointestinal: Negative for hematochezia and melena.   Neurological: Negative for dizziness and focal weakness.        Objective:    Physical Exam   Constitutional: He is oriented to person, place, and time. He appears well-developed and well-nourished. No distress.   HENT:   Head: Normocephalic and atraumatic.   Eyes: Conjunctivae are normal. Right eye exhibits no discharge. Left eye exhibits no discharge. No scleral icterus.   Neck: Neck supple. No JVD present.   Cardiovascular: Regular rhythm and normal heart sounds.  Bradycardia present.  Exam reveals no gallop and no friction rub.    No murmur heard.  Pulmonary/Chest: Effort normal and breath sounds normal. No respiratory distress. He has no wheezes. He has no rales.   Abdominal: Soft. Bowel sounds are normal. He exhibits no distension. There is no tenderness. There is no rebound.   Musculoskeletal: He exhibits no edema.   Neurological: He is alert and oriented to person, place, and time.   Skin: Skin is warm and dry. He is not diaphoretic.   Psychiatric: He has a normal mood and affect. His behavior is normal. Judgment and thought content normal.   Vitals reviewed.        Assessment:       1. PAF (paroxysmal atrial  fibrillation)    2. Chronic anticoagulation - on apixaban    3. History of stroke    4. Complex sleep apnea syndrome         Plan:     In summary, Mr. Crowell is a pleasant 78 year-old man with prior TIAs/stroke (off anticoagulation), obstructive sleep apnea, and paroxysmal atrial fibrillation intolerant to dronaderone and amiodarone. We discussed PVI in the past and he preferred alternate anti-arrhythmic therapy. Doing well on current therapy.     Continue flecainide/metoprolol/eliquis  BMP/CBC today  RTC in  6 months, sooner if needed.     Thank you for allowing me to participate in the care of this patient. Please do not hesitate to call me with any questions or concerns.     Vaughn Crowder MD, PhD  Cardiac Electrophysiology

## 2018-07-18 ENCOUNTER — TELEPHONE (OUTPATIENT)
Dept: ELECTROPHYSIOLOGY | Facility: CLINIC | Age: 78
End: 2018-07-18

## 2018-07-18 NOTE — TELEPHONE ENCOUNTER
----- Message from Vaughn Crowder MD sent at 7/17/2018  6:30 PM CDT -----  Please notify the patient that his lab results are normal.

## 2018-07-30 DIAGNOSIS — I63.449 CEREBRAL INFARCTION DUE TO EMBOLISM OF UNSPECIFIED CEREBELLAR ARTERY: ICD-10-CM

## 2018-07-30 DIAGNOSIS — Z79.01 CHRONIC ANTICOAGULATION: Chronic | ICD-10-CM

## 2018-07-30 DIAGNOSIS — I48.0 PAF (PAROXYSMAL ATRIAL FIBRILLATION): ICD-10-CM

## 2018-08-09 ENCOUNTER — TELEPHONE (OUTPATIENT)
Dept: ELECTROPHYSIOLOGY | Facility: CLINIC | Age: 78
End: 2018-08-09

## 2018-08-09 NOTE — TELEPHONE ENCOUNTER
Outside request received from Dr. Raul Hernandez's office (Oral Surgeon)  [phone] phone  [phone] fax    Office requesting permission for Mr. Crowell to hold Eliquis 2-3 days prior to tooth extraction.

## 2018-08-10 NOTE — TELEPHONE ENCOUNTER
Spoke to Linette with Dr. Hernandez's office and relayed Dr. Crowder' recommendation.  She states she will need to speak to Dr. Hernandez.  Let her know I will fax encounter and wait to hear back from them if they have any further questions/concerns.    Also let Linette know Dr. rCowder does NOT give permission for anesthesia for patients.      Carolyn verbalizes understanding and appreciates call.

## 2018-08-20 DIAGNOSIS — N40.0 BENIGN NODULAR PROSTATIC HYPERPLASIA: ICD-10-CM

## 2018-08-20 RX ORDER — FINASTERIDE 5 MG/1
5 TABLET, FILM COATED ORAL DAILY
Qty: 90 TABLET | Refills: 3 | Status: SHIPPED | OUTPATIENT
Start: 2018-08-20 | End: 2019-01-01 | Stop reason: SDUPTHER

## 2018-08-20 NOTE — TELEPHONE ENCOUNTER
----- Message from Macarena Lance sent at 8/20/2018  2:12 PM CDT -----  Contact: [facility]][address] [phone]  Prescription Request:     Name of medication: finasteride (PROSCAR) 5 mg tablet    Reason for request: Refill    Pharmacy: [facility] - [facility] [id number]    Please advise.    Thank You

## 2018-09-06 DIAGNOSIS — K21.9 GASTROESOPHAGEAL REFLUX DISEASE WITHOUT ESOPHAGITIS: Chronic | ICD-10-CM

## 2018-09-06 RX ORDER — OMEPRAZOLE 40 MG/1
40 CAPSULE, DELAYED RELEASE ORAL DAILY
Qty: 90 CAPSULE | Refills: 3 | Status: SHIPPED | OUTPATIENT
Start: 2018-09-06 | End: 2019-01-01 | Stop reason: SDUPTHER

## 2018-09-25 DIAGNOSIS — E78.2 MIXED HYPERLIPIDEMIA: Chronic | ICD-10-CM

## 2018-09-25 RX ORDER — ATORVASTATIN CALCIUM 40 MG/1
40 TABLET, FILM COATED ORAL DAILY
Qty: 90 TABLET | Refills: 3 | Status: SHIPPED | OUTPATIENT
Start: 2018-09-25 | End: 2019-01-01 | Stop reason: SDUPTHER

## 2018-10-28 DIAGNOSIS — I48.0 PAF (PAROXYSMAL ATRIAL FIBRILLATION): ICD-10-CM

## 2018-10-29 RX ORDER — METOPROLOL TARTRATE 25 MG/1
TABLET, FILM COATED ORAL
Qty: 90 TABLET | Refills: 11 | Status: SHIPPED | OUTPATIENT
Start: 2018-10-29 | End: 2019-01-01 | Stop reason: SDUPTHER

## 2018-11-03 ENCOUNTER — PATIENT MESSAGE (OUTPATIENT)
Dept: INTERNAL MEDICINE | Facility: CLINIC | Age: 78
End: 2018-11-03

## 2018-11-05 ENCOUNTER — OFFICE VISIT (OUTPATIENT)
Dept: INTERNAL MEDICINE | Facility: CLINIC | Age: 78
End: 2018-11-05
Payer: MEDICARE

## 2018-11-05 ENCOUNTER — HOSPITAL ENCOUNTER (OUTPATIENT)
Dept: RADIOLOGY | Facility: HOSPITAL | Age: 78
Discharge: HOME | End: 2018-11-05
Attending: INTERNAL MEDICINE
Payer: MEDICARE

## 2018-11-05 VITALS
SYSTOLIC BLOOD PRESSURE: 116 MMHG | BODY MASS INDEX: 32.18 KG/M2 | WEIGHT: 205 LBS | HEIGHT: 67 IN | DIASTOLIC BLOOD PRESSURE: 80 MMHG | HEART RATE: 68 BPM | OXYGEN SATURATION: 95 %

## 2018-11-05 DIAGNOSIS — R06.09 DOE (DYSPNEA ON EXERTION): Primary | ICD-10-CM

## 2018-11-05 DIAGNOSIS — G47.31 COMPLEX SLEEP APNEA SYNDROME: ICD-10-CM

## 2018-11-05 DIAGNOSIS — R06.09 DOE (DYSPNEA ON EXERTION): ICD-10-CM

## 2018-11-05 DIAGNOSIS — R53.83 FATIGUE, UNSPECIFIED TYPE: ICD-10-CM

## 2018-11-05 DIAGNOSIS — R60.0 BILATERAL LEG EDEMA: ICD-10-CM

## 2018-11-05 DIAGNOSIS — R17 SERUM TOTAL BILIRUBIN ELEVATED: ICD-10-CM

## 2018-11-05 DIAGNOSIS — I48.0 PAF (PAROXYSMAL ATRIAL FIBRILLATION): ICD-10-CM

## 2018-11-05 LAB
BILIRUB UR QL STRIP: NEGATIVE
CLARITY UR REFRACT.AUTO: CLEAR
COLOR UR AUTO: YELLOW
GLUCOSE UR QL STRIP: NEGATIVE
HGB UR QL STRIP: NEGATIVE
KETONES UR QL STRIP: NEGATIVE
LEUKOCYTE ESTERASE UR QL STRIP: NEGATIVE
NITRITE UR QL STRIP: NEGATIVE
PH UR STRIP: 5 [PH] (ref 5–8)
PROT UR QL STRIP: NEGATIVE
SP GR UR STRIP: 1.01 (ref 1–1.03)
URN SPEC COLLECT METH UR: NORMAL

## 2018-11-05 PROCEDURE — 71046 X-RAY EXAM CHEST 2 VIEWS: CPT | Mod: 26,,, | Performed by: RADIOLOGY

## 2018-11-05 PROCEDURE — 93005 ELECTROCARDIOGRAM TRACING: CPT | Mod: PBBFAC | Performed by: INTERNAL MEDICINE

## 2018-11-05 PROCEDURE — 71046 X-RAY EXAM CHEST 2 VIEWS: CPT | Mod: TC

## 2018-11-05 PROCEDURE — 93010 ELECTROCARDIOGRAM REPORT: CPT | Mod: S$PBB,,, | Performed by: INTERNAL MEDICINE

## 2018-11-05 PROCEDURE — 99213 OFFICE O/P EST LOW 20 MIN: CPT | Mod: PBBFAC,25 | Performed by: INTERNAL MEDICINE

## 2018-11-05 PROCEDURE — 81003 URINALYSIS AUTO W/O SCOPE: CPT

## 2018-11-05 PROCEDURE — 99999 PR PBB SHADOW E&M-EST. PATIENT-LVL III: CPT | Mod: PBBFAC,,, | Performed by: INTERNAL MEDICINE

## 2018-11-05 PROCEDURE — 99214 OFFICE O/P EST MOD 30 MIN: CPT | Mod: S$PBB,,, | Performed by: INTERNAL MEDICINE

## 2018-11-05 RX ORDER — ACETAMINOPHEN 500 MG
500 TABLET ORAL EVERY 6 HOURS PRN
COMMUNITY

## 2018-11-05 NOTE — PROGRESS NOTES
INTERNAL MEDICINE ESTABLISHED PATIENT VISIT NOTE    Subjective:     Chief Complaint: Shortness of Breath; Fatigue; and Leg Swelling       Patient ID: Taran Crowell is a 78 y.o. male with PMHx of embolic CVA in 5/2017 (at baseline has intermittent dysphagia prev evaluated by speech, denies recent issues).  Pt also c HLD, PAF on apixaban, BPH, GERD, seasonal allergies, LUNA on Bipap, baseline tremor on Remeron by Neuro (says Parkinson's w/u was neg), last seen by me in May, here today for focused visit for LE edema and SOB.    Reports sx began about two mos ago c progressive worsening.  Denies cp or palpitations.  Mild cough but reports that just started this past week c recent cold-like sx.  No fever.  Denies orthopnea or PND.      Over the past week, his wife Linh has noticed he is sob c exertion.  Unsure if changes in diet recently but eats at [employer], does not think he has a low Na diet ordered.  Says he had some soup recently that was salty.    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 No mass effect or hemorrhage. Additional remote lacunar type infarcts noted in this same region. Decreased signal in the distal right vertebral artery, suggesting hypoplasia or stenosis. The vertebral basilar system is left-sided dominant.    Chronic anticoagulation - on apixaban 5/29/2017    Essential tremor 5/29/2017    On Remeron    GERD (gastroesophageal reflux disease)     History of colon polyps 3/18/2014    Hypothyroidism due to acquired atrophy of thyroid 09/30/2015    Last on 2015.  None since then.    Memory loss last months.    Mixed hyperlipidemia 5/29/2017    PAF (paroxysmal atrial fibrillation) 6/16/2015        Home Medications:  Prior to Admission medications    Medication Sig Start Date End Date Taking? Authorizing Provider   acetaminophen (TYLENOL) 500 MG tablet Take 500 mg by mouth every 6 (six) hours as needed for Pain.   Yes Historical Provider, MD   apixaban 5 mg Tab Take 1 tablet (5 mg total) by mouth 2 (two) times daily. 7/30/18  Yes Kemi Lopez MD   atorvastatin (LIPITOR) 40 MG tablet Take 1 tablet (40 mg total) by mouth once daily. 9/25/18  Yes Kemi Lopez MD   finasteride (PROSCAR) 5 mg tablet Take 1 tablet (5 mg total) by mouth once daily. 8/20/18 8/20/19 Yes Kemi Lopez MD   fluticasone (FLONASE) 50 mcg/actuation nasal spray 2 sprays by Each Nare route once daily. 5/29/17  Yes Juan Pablo Espion MD   metoprolol tartrate (LOPRESSOR) 25 MG tablet TAKE 1/2 TABLET TWICE DAILY 10/29/18  Yes Vaughn Crowder MD   mirtazapine (REMERON) 30 MG tablet Take 1 tablet (30 mg total) by mouth nightly. 7/9/18  Yes Kemi Lopez MD   mupirocin (BACTROBAN) 2 % ointment Apply topically 3 (three) times daily. 10/25/17  Yes Kemi Lopez MD   omeprazole (PRILOSEC) 40 MG capsule Take 1 capsule (40 mg total) by mouth once daily. 9/6/18  Yes Kemi Lopez MD   tamsulosin (FLOMAX) 0.4 mg Cap Take 1 capsule (0.4 mg total) by mouth every evening. 7/16/18  Yes Kemi Lopez MD   flecainide (TAMBOCOR) 50 MG Tab Take 1 tablet (50 mg total) by mouth every 12 (twelve) hours. 11/1/17 11/1/18  Vaughn Crowder MD       Allergies:  Review of patient's allergies indicates:  No Known Allergies    Social History:  Social History     Tobacco Use    Smoking status: Former Smoker     Types: Pipe    Smokeless tobacco: Former User    Tobacco comment: smoked pipe regularly once a day but quit 10-15 yrs ago   Substance Use Topics    Alcohol use: Yes     Comment: social    Drug use: No        Review of Systems   Constitutional: Positive for fatigue. Negative for chills and fever.   Respiratory: Positive for cough and shortness of breath. Negative for chest tightness.  "   Cardiovascular: Positive for leg swelling. Negative for chest pain.   Gastrointestinal: Negative for abdominal pain and blood in stool.   Genitourinary: Negative for dysuria and frequency.         Health Maintenance:   Immunizations:   Influenza two weeks ago at [facility] via [employer]  TDap is up to date 5/2017  Pneumovax is up to date.  Pt to bring yellow card of vacc records to see if both given since he forgot again, advised to send via portal.  Zostavax is UTD.  12/2011  Shingrix rec but out of stock today     Cancer Screening:  Colonoscopy: is up to date. 3/2014  Int hemorrhoids, diverticula, otherwise normal c rec to repeat in 6-7 years.    Objective:   /80 (BP Location: Right arm, Patient Position: Sitting, BP Method: Large (Manual))   Pulse 68   Ht 5' 7" (1.702 m)   Wt 93 kg (205 lb 0.4 oz)   SpO2 95%   BMI 32.11 kg/m²        General: AAO x3, no apparent distress  CV: RRR, faint murmur at LLSB  Pulm: Lungs CTAB, no crackles, no wheezes  Abd: s/NT/ND +BS  Extremities: trace LE edema on BLE    Labs:         Assessment/Plan     Taran was seen today for shortness of breath, fatigue and leg swelling.    Diagnoses and all orders for this visit:    HAY (dyspnea on exertion)  Fatigue, unspecified type  Bilateral leg edema  EKG today c SR, Afib has been rate/rhythm controlled  Trace edema on BLE on exam, lungs clear  Will check basic labs, echo, cxr, u/a  Counseled on low Na diet and encouraged activity as tolerated to avoid dependent edema.  Not taking NSAIDS.  -     Brain natriuretic peptide; Future  -     Comprehensive metabolic panel; Future  -     URINALYSIS  -     IN OFFICE EKG 12-LEAD (to Muse)  -     X-Ray Chest PA And Lateral; Future  -     Transthoracic echo (TTE) complete (Cupid Only); Future    PAF (paroxysmal atrial fibrillation)  No issues, rate/rhythm controlled and followed by Cards, on Apixaban for stroke prevention  -     IN OFFICE EKG 12-LEAD (to Muse)    Complex sleep apnea " syndrome  Stable, no issues    Serum total bilirubin elevated  -     BILIRUBIN, DIRECT; Future    HM as above  RTC in 1 mo for f/u above sx, pt to call me if sx fail to improve.    Kemi Lopez MD  Department of Internal Medicine - [facility]  11/05/2018

## 2018-11-05 NOTE — TELEPHONE ENCOUNTER
Left message for pt to call back since he hasnt replied back to md message yet. Stated we wanted to know how he was doing

## 2018-11-06 ENCOUNTER — TELEPHONE (OUTPATIENT)
Dept: INTERNAL MEDICINE | Facility: CLINIC | Age: 78
End: 2018-11-06

## 2018-11-06 DIAGNOSIS — M85.89 OTHER SPECIFIED DISORDERS OF BONE DENSITY AND STRUCTURE, MULTIPLE SITES: ICD-10-CM

## 2018-11-06 DIAGNOSIS — M85.9 DISORDER OF BONE DENSITY AND STRUCTURE, UNSPECIFIED: ICD-10-CM

## 2018-11-06 DIAGNOSIS — M85.80 OSTEOPENIA DETERMINED BY X-RAY: Primary | ICD-10-CM

## 2018-11-06 NOTE — TELEPHONE ENCOUNTER
Possible osteopenia on CXR  Attempted to call pt twice, no answer, will have MA try to contact pt to schedule DEXA scan.

## 2018-11-12 ENCOUNTER — HOSPITAL ENCOUNTER (OUTPATIENT)
Dept: RADIOLOGY | Facility: CLINIC | Age: 78
Discharge: HOME | End: 2018-11-12
Attending: INTERNAL MEDICINE
Payer: MEDICARE

## 2018-11-12 ENCOUNTER — HOSPITAL ENCOUNTER (OUTPATIENT)
Dept: CARDIOLOGY | Facility: CLINIC | Age: 78
Discharge: HOME | End: 2018-11-12
Attending: INTERNAL MEDICINE
Payer: MEDICARE

## 2018-11-12 VITALS
SYSTOLIC BLOOD PRESSURE: 116 MMHG | DIASTOLIC BLOOD PRESSURE: 80 MMHG | BODY MASS INDEX: 32.18 KG/M2 | HEART RATE: 66 BPM | HEIGHT: 67 IN | WEIGHT: 205 LBS

## 2018-11-12 DIAGNOSIS — M85.89 OTHER SPECIFIED DISORDERS OF BONE DENSITY AND STRUCTURE, MULTIPLE SITES: ICD-10-CM

## 2018-11-12 DIAGNOSIS — R06.09 DOE (DYSPNEA ON EXERTION): ICD-10-CM

## 2018-11-12 DIAGNOSIS — R53.83 FATIGUE, UNSPECIFIED TYPE: ICD-10-CM

## 2018-11-12 DIAGNOSIS — M85.80 OSTEOPENIA DETERMINED BY X-RAY: ICD-10-CM

## 2018-11-12 DIAGNOSIS — R60.0 BILATERAL LEG EDEMA: ICD-10-CM

## 2018-11-12 DIAGNOSIS — M85.9 DISORDER OF BONE DENSITY AND STRUCTURE, UNSPECIFIED: ICD-10-CM

## 2018-11-12 LAB
ASCENDING AORTA: 3.62 CM
AV MEAN GRADIENT: 6.69 MMHG
AV PEAK GRADIENT: 15.21 MMHG
AV VALVE AREA: 2.27 CM2
BSA FOR ECHO PROCEDURE: 2.1 M2
CV ECHO LV RWT: 0.44 CM
DOP CALC AO PEAK VEL: 1.95 M/S
DOP CALC AO VTI: 39.59 CM
DOP CALC LVOT AREA: 3.94 CM2
DOP CALC LVOT DIAMETER: 2.24 CM
DOP CALC LVOT STROKE VOLUME: 90.04 CM3
DOP CALCLVOT PEAK VEL VTI: 22.86 CM
E WAVE DECELERATION TIME: 295.65 MSEC
E/A RATIO: 0.69
E/E' RATIO: 13.17
ECHO LV POSTERIOR WALL: 1.03 CM (ref 0.6–1.1)
FRACTIONAL SHORTENING: 33 % (ref 28–44)
INTERVENTRICULAR SEPTUM: 1.11 CM (ref 0.6–1.1)
IVRT: 0.11 MSEC
LA MAJOR: 5.7 CM
LA MINOR: 6 CM
LA WIDTH: 4 CM
LEFT ATRIUM SIZE: 4 CM
LEFT ATRIUM VOLUME INDEX: 37.9 ML/M2
LEFT ATRIUM VOLUME: 79.51 CM3
LEFT INTERNAL DIMENSION IN SYSTOLE: 3.15 CM (ref 2.1–4)
LEFT VENTRICLE DIASTOLIC VOLUME INDEX: 48.79 ML/M2
LEFT VENTRICLE DIASTOLIC VOLUME: 102.46 ML
LEFT VENTRICLE MASS INDEX: 85.9 G/M2
LEFT VENTRICLE SYSTOLIC VOLUME INDEX: 18.7 ML/M2
LEFT VENTRICLE SYSTOLIC VOLUME: 39.36 ML
LEFT VENTRICULAR INTERNAL DIMENSION IN DIASTOLE: 4.7 CM (ref 3.5–6)
LEFT VENTRICULAR MASS: 180.47 G
LV LATERAL E/E' RATIO: 11.29
LV SEPTAL E/E' RATIO: 15.8
MV PEAK A VEL: 1.14 M/S
MV PEAK E VEL: 0.79 M/S
PISA TR MAX VEL: 2.44 M/S
PULM VEIN S/D RATIO: 0.94
PV PEAK D VEL: 0.5 M/S
PV PEAK S VEL: 0.47 M/S
RA MAJOR: 5.6 CM
RA WIDTH: 4.46 CM
RIGHT VENTRICULAR END-DIASTOLIC DIMENSION: 4.01 CM
RV TISSUE DOPPLER FREE WALL SYSTOLIC VELOCITY 1 (APICAL 4 CHAMBER VIEW): 13.49 M/S
SINUS: 3.88 CM
STJ: 2.89 CM
TDI LATERAL: 0.07
TDI SEPTAL: 0.05
TDI: 0.06
TR MAX PG: 23.81 MMHG
TRICUSPID ANNULAR PLANE SYSTOLIC EXCURSION: 2.43 CM

## 2018-11-12 PROCEDURE — 77080 DXA BONE DENSITY AXIAL: CPT | Mod: 26,,, | Performed by: INTERNAL MEDICINE

## 2018-11-12 PROCEDURE — 77080 DXA BONE DENSITY AXIAL: CPT | Mod: TC

## 2018-11-12 PROCEDURE — 93306 TTE W/DOPPLER COMPLETE: CPT | Mod: PBBFAC | Performed by: INTERNAL MEDICINE

## 2018-12-05 ENCOUNTER — CLINICAL SUPPORT (OUTPATIENT)
Dept: INTERNAL MEDICINE | Facility: CLINIC | Age: 78
End: 2018-12-05
Payer: MEDICARE

## 2018-12-05 ENCOUNTER — OFFICE VISIT (OUTPATIENT)
Dept: INTERNAL MEDICINE | Facility: CLINIC | Age: 78
End: 2018-12-05
Payer: MEDICARE

## 2018-12-05 DIAGNOSIS — R53.82 CHRONIC FATIGUE: Primary | ICD-10-CM

## 2018-12-05 DIAGNOSIS — G25.0 ESSENTIAL TREMOR: Chronic | ICD-10-CM

## 2018-12-05 DIAGNOSIS — Z86.73 HISTORY OF STROKE: ICD-10-CM

## 2018-12-05 DIAGNOSIS — I48.0 PAF (PAROXYSMAL ATRIAL FIBRILLATION): ICD-10-CM

## 2018-12-05 DIAGNOSIS — E78.2 MIXED HYPERLIPIDEMIA: Chronic | ICD-10-CM

## 2018-12-05 DIAGNOSIS — Z23 NEED FOR VACCINATION AGAINST STREPTOCOCCUS PNEUMONIAE USING PNEUMOCOCCAL CONJUGATE VACCINE 13: Primary | ICD-10-CM

## 2018-12-05 DIAGNOSIS — Z79.01 CHRONIC ANTICOAGULATION: Chronic | ICD-10-CM

## 2018-12-05 DIAGNOSIS — G47.31 COMPLEX SLEEP APNEA SYNDROME: ICD-10-CM

## 2018-12-05 DIAGNOSIS — N40.0 BENIGN NODULAR PROSTATIC HYPERPLASIA: ICD-10-CM

## 2018-12-05 PROCEDURE — 99213 OFFICE O/P EST LOW 20 MIN: CPT | Mod: PBBFAC,25 | Performed by: INTERNAL MEDICINE

## 2018-12-05 PROCEDURE — 99214 OFFICE O/P EST MOD 30 MIN: CPT | Mod: S$PBB,,, | Performed by: INTERNAL MEDICINE

## 2018-12-05 PROCEDURE — 90670 PCV13 VACCINE IM: CPT | Mod: PBBFAC

## 2018-12-05 PROCEDURE — 99999 PR PBB SHADOW E&M-EST. PATIENT-LVL III: CPT | Mod: PBBFAC,,, | Performed by: INTERNAL MEDICINE

## 2018-12-05 RX ORDER — DOCUSATE SODIUM 100 MG/1
100 CAPSULE, LIQUID FILLED ORAL 2 TIMES DAILY
COMMUNITY
End: 2019-01-01 | Stop reason: ALTCHOICE

## 2018-12-05 RX ORDER — MIRTAZAPINE 15 MG/1
15 TABLET, FILM COATED ORAL NIGHTLY
Qty: 90 TABLET | Refills: 1 | Status: SHIPPED | OUTPATIENT
Start: 2018-12-05 | End: 2019-01-01 | Stop reason: SDUPTHER

## 2018-12-05 NOTE — PROGRESS NOTES
INTERNAL MEDICINE ESTABLISHED PATIENT VISIT NOTE    Subjective:     Chief Complaint: Follow-up  fatigue     Patient ID: Taran Crowell is a 78 y.o. male with PMHx of embolic CVA in 5/2017 (at baseline has intermittent dysphagia prev evaluated by speech, denies recent issues).  Pt also c HLD, PAF on apixaban, BPH, GERD, seasonal allergies, LUNA on Bipap, baseline tremor on Remeron by Neuro (says Parkinson's w/u was neg), last seen by me a month ago, here today for f/u.    At last appt, noted some LE edema and mild, progressive SOB.  Basic w/u at that time c stable Cr, no proteinuria, normal Echo, and stable EKG.  Pt reports the LE edema has improved slightly and denies sob today but says he just feels tired.  Has been using his Bipap nightly as prev rx'ed.  Does admit he has gained some weight and thinks that might be part of the fatigue.    Does light exercises in the chair at [facility] but unable to do much exercise outside of that.    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 No mass effect or hemorrhage. Additional remote lacunar type infarcts noted in this same region. Decreased signal in the distal right vertebral artery, suggesting hypoplasia or stenosis. The vertebral basilar system is left-sided dominant.    Chronic anticoagulation - on apixaban 5/29/2017    Essential tremor 5/29/2017    On Remeron    GERD (gastroesophageal reflux disease)     History of colon polyps 3/18/2014    Hypothyroidism due to acquired atrophy of thyroid 09/30/2015    Last on 2015.  None since then.    Memory loss last months.    Mixed hyperlipidemia 5/29/2017    PAF (paroxysmal atrial fibrillation) 6/16/2015       Home  Medications:  Prior to Admission medications    Medication Sig Start Date End Date Taking? Authorizing Provider   acetaminophen (TYLENOL) 500 MG tablet Take 500 mg by mouth every 6 (six) hours as needed for Pain.   Yes Historical Provider, MD   apixaban 5 mg Tab Take 1 tablet (5 mg total) by mouth 2 (two) times daily. 7/30/18  Yes Kemi Lopez MD   atorvastatin (LIPITOR) 40 MG tablet Take 1 tablet (40 mg total) by mouth once daily. 9/25/18  Yes Kemi Lopez MD   calcium carbonate/vitamin D3 (CALTRATE 600 + D ORAL) Take by mouth.   Yes Historical Provider, MD   docusate sodium (COLACE) 100 MG capsule Take 100 mg by mouth 2 (two) times daily.   Yes Historical Provider, MD   finasteride (PROSCAR) 5 mg tablet Take 1 tablet (5 mg total) by mouth once daily. 8/20/18 8/20/19 Yes Kemi Lopez MD   fluticasone (FLONASE) 50 mcg/actuation nasal spray 2 sprays by Each Nare route once daily. 5/29/17  Yes Juan Pablo Espino MD   metoprolol tartrate (LOPRESSOR) 25 MG tablet TAKE 1/2 TABLET TWICE DAILY 10/29/18  Yes Vaughn Crowder MD   mirtazapine (REMERON) 30 MG tablet Take 1 tablet (30 mg total) by mouth nightly. 7/9/18  Yes Kemi Lopez MD   mupirocin (BACTROBAN) 2 % ointment Apply topically 3 (three) times daily. 10/25/17  Yes Kemi Lopez MD   omeprazole (PRILOSEC) 40 MG capsule Take 1 capsule (40 mg total) by mouth once daily. 9/6/18  Yes Kemi Lopez MD   tamsulosin (FLOMAX) 0.4 mg Cap Take 1 capsule (0.4 mg total) by mouth every evening. 7/16/18  Yes Kemi Lopez MD   flecainide (TAMBOCOR) 50 MG Tab Take 1 tablet (50 mg total) by mouth every 12 (twelve) hours. 11/1/17 11/1/18  Vaughn Crowder MD       Allergies:  Review of patient's allergies indicates:  No Known Allergies    Social History:  Social History     Tobacco Use    Smoking status: Former Smoker     Types: Pipe    Smokeless tobacco: Former User    Tobacco comment: smoked pipe regularly once a day but quit 10-15 yrs ago   Substance Use Topics    Alcohol use: Yes     Comment:  "social    Drug use: No        Review of Systems   Constitutional: Positive for fatigue. Negative for chills and fever.   Respiratory: Negative for cough, chest tightness and shortness of breath.    Cardiovascular: Negative for chest pain.   Gastrointestinal: Negative for abdominal pain and blood in stool.   Genitourinary: Negative for dysuria and frequency.         Health Maintenance:     Immunizations:   Influenza 10/2018 at [facility]  TDap is up to date 5/2017  Pneumovax 10/2011, Prevnar 13 12/5/18  Zostavax is UTD.  12/2011  Shingrix rec but out of stock today     Cancer Screening:  Colonoscopy: is up to date. 3/2014  Int hemorrhoids, diverticula, otherwise normal c rec to repeat in 6-7 years.      Objective:   /70   Pulse 60   Ht 5' 7" (1.702 m)   Wt 94.7 kg (208 lb 12.1 oz)   SpO2 97%   BMI 32.70 kg/m²        General: AAO x3, no apparent distress, obese  CV: RRR, no m/r/g  Pulm: Lungs CTAB, no crackles, no wheezes  Abd: s/NT/ND +BS  Extremities: trace pitting edema BLE    Labs:     Lab Results   Component Value Date    WBC 8.34 07/17/2018    HGB 14.4 07/17/2018    HCT 43.9 07/17/2018    MCV 98 07/17/2018     07/17/2018     CMP  Sodium   Date Value Ref Range Status   11/05/2018 139 136 - 145 mmol/L Final     Potassium   Date Value Ref Range Status   11/05/2018 4.6 3.5 - 5.1 mmol/L Final     Chloride   Date Value Ref Range Status   11/05/2018 106 95 - 110 mmol/L Final     CO2   Date Value Ref Range Status   11/05/2018 27 23 - 29 mmol/L Final     Glucose   Date Value Ref Range Status   11/05/2018 96 70 - 110 mg/dL Final     BUN, Bld   Date Value Ref Range Status   11/05/2018 19 8 - 23 mg/dL Final     Creatinine   Date Value Ref Range Status   11/05/2018 1.0 0.5 - 1.4 mg/dL Final     Calcium   Date Value Ref Range Status   11/05/2018 9.2 8.7 - 10.5 mg/dL Final     Total Protein   Date Value Ref Range Status   11/05/2018 6.7 6.0 - 8.4 g/dL Final     Albumin   Date Value Ref Range Status "   11/05/2018 4.1 3.5 - 5.2 g/dL Final     Total Bilirubin   Date Value Ref Range Status   11/05/2018 0.8 0.1 - 1.0 mg/dL Final     Comment:     For infants and newborns, interpretation of results should be based  on gestational age, weight and in agreement with clinical  observations.  Premature Infant recommended reference ranges:  Up to 24 hours.............<8.0 mg/dL  Up to 48 hours............<12.0 mg/dL  3-5 days..................<15.0 mg/dL  6-29 days.................<15.0 mg/dL       Alkaline Phosphatase   Date Value Ref Range Status   11/05/2018 43 (L) 55 - 135 U/L Final     AST   Date Value Ref Range Status   11/05/2018 22 10 - 40 U/L Final     ALT   Date Value Ref Range Status   11/05/2018 14 10 - 44 U/L Final     Anion Gap   Date Value Ref Range Status   11/05/2018 6 (L) 8 - 16 mmol/L Final     eGFR if    Date Value Ref Range Status   11/05/2018 >60 >60 mL/min/1.73 m^2 Final     eGFR if non    Date Value Ref Range Status   11/05/2018 >60 >60 mL/min/1.73 m^2 Final     Comment:     Calculation used to obtain the estimated glomerular filtration  rate (eGFR) is the CKD-EPI equation.        Lab Results   Component Value Date    LDLCALC 86.2 01/26/2018     Lab Results   Component Value Date    HGBA1C 5.5 05/22/2017     Lab Results   Component Value Date    TSH 2.081 11/05/2018     No results found for: [id number]      Assessment/Plan     Taran was seen today for follow-up.    Diagnoses and all orders for this visit:    Chronic fatigue  May have component of deconditioning, encouraged activity as tolerated  Reports compliance c Bipap for LUNA  Could also be s/e of Remeron, consider decreasing dose, risk vs benefit discussed.    Essential tremor  As above, can decrease dose as tolerated  -     mirtazapine (REMERON) 15 MG tablet; Take 1 tablet (15 mg total) by mouth nightly.    History of stroke  Cont secondary prevention, no acute issues    PAF (paroxysmal atrial  fibrillation)  Chronic anticoagulation - on apixaban  No issues, cont current mgmt    Mixed hyperlipidemia  Lab Results   Component Value Date    LDLCALC 86.2 01/26/2018     Close to goal  Cont current meds    Complex sleep apnea syndrome  As per HPI  Cont use of Bipap    Benign nodular prostatic hyperplasia  Stable, no issues      HM as above  RTC in 3 mos for f/u.    Kemi Lopez MD  Department of Internal Medicine - [facility]  12/10/2018

## 2018-12-10 VITALS
WEIGHT: 208.75 LBS | HEART RATE: 60 BPM | DIASTOLIC BLOOD PRESSURE: 70 MMHG | SYSTOLIC BLOOD PRESSURE: 112 MMHG | HEIGHT: 67 IN | BODY MASS INDEX: 32.76 KG/M2 | OXYGEN SATURATION: 97 %

## 2018-12-17 DIAGNOSIS — I48.0 PAF (PAROXYSMAL ATRIAL FIBRILLATION): ICD-10-CM

## 2018-12-18 RX ORDER — FLECAINIDE ACETATE 50 MG/1
TABLET ORAL
Qty: 180 TABLET | Refills: 11 | Status: SHIPPED | OUTPATIENT
Start: 2018-12-18

## 2019-01-01 ENCOUNTER — HOSPITAL ENCOUNTER (OUTPATIENT)
Dept: UROLOGY | Facility: HOSPITAL | Age: 79
Discharge: HOME | End: 2019-04-08
Attending: UROLOGY
Payer: MEDICARE

## 2019-01-01 ENCOUNTER — OFFICE VISIT (OUTPATIENT)
Dept: INTERNAL MEDICINE | Facility: CLINIC | Age: 79
End: 2019-01-01
Payer: MEDICARE

## 2019-01-01 ENCOUNTER — PATIENT OUTREACH (OUTPATIENT)
Dept: ADMINISTRATIVE | Facility: HOSPITAL | Age: 79
End: 2019-01-01

## 2019-01-01 ENCOUNTER — CLINICAL SUPPORT (OUTPATIENT)
Dept: INTERNAL MEDICINE | Facility: CLINIC | Age: 79
End: 2019-01-01
Payer: MEDICARE

## 2019-01-01 ENCOUNTER — OFFICE VISIT (OUTPATIENT)
Dept: INFECTIOUS DISEASES | Facility: CLINIC | Age: 79
End: 2019-01-01

## 2019-01-01 ENCOUNTER — CLINICAL SUPPORT (OUTPATIENT)
Dept: INFECTIOUS DISEASES | Facility: CLINIC | Age: 79
End: 2019-01-01

## 2019-01-01 ENCOUNTER — HOSPITAL ENCOUNTER (OUTPATIENT)
Dept: RADIOLOGY | Facility: HOSPITAL | Age: 79
Discharge: HOME | End: 2019-04-05
Attending: UROLOGY
Payer: MEDICARE

## 2019-01-01 ENCOUNTER — TELEPHONE (OUTPATIENT)
Dept: INTERNAL MEDICINE | Facility: CLINIC | Age: 79
End: 2019-01-01

## 2019-01-01 VITALS
SYSTOLIC BLOOD PRESSURE: 128 MMHG | WEIGHT: 202.63 LBS | RESPIRATION RATE: 16 BRPM | BODY MASS INDEX: 31.8 KG/M2 | HEIGHT: 67 IN | DIASTOLIC BLOOD PRESSURE: 66 MMHG | TEMPERATURE: 98 F | HEART RATE: 65 BPM

## 2019-01-01 VITALS
BODY MASS INDEX: 31.83 KG/M2 | WEIGHT: 202.81 LBS | HEART RATE: 68 BPM | SYSTOLIC BLOOD PRESSURE: 118 MMHG | DIASTOLIC BLOOD PRESSURE: 80 MMHG | HEIGHT: 67 IN

## 2019-01-01 VITALS
SYSTOLIC BLOOD PRESSURE: 111 MMHG | HEIGHT: 67 IN | BODY MASS INDEX: 32.28 KG/M2 | DIASTOLIC BLOOD PRESSURE: 70 MMHG | HEART RATE: 64 BPM | WEIGHT: 205.69 LBS | TEMPERATURE: 98 F

## 2019-01-01 DIAGNOSIS — Z79.01 CHRONIC ANTICOAGULATION: Chronic | ICD-10-CM

## 2019-01-01 DIAGNOSIS — N13.8 BPH WITH URINARY OBSTRUCTION: ICD-10-CM

## 2019-01-01 DIAGNOSIS — E78.2 MIXED HYPERLIPIDEMIA: Chronic | ICD-10-CM

## 2019-01-01 DIAGNOSIS — Z86.73 HISTORY OF STROKE: ICD-10-CM

## 2019-01-01 DIAGNOSIS — G25.0 ESSENTIAL TREMOR: Chronic | ICD-10-CM

## 2019-01-01 DIAGNOSIS — N40.1 BPH WITH URINARY OBSTRUCTION: Primary | ICD-10-CM

## 2019-01-01 DIAGNOSIS — I70.0 AORTIC ATHEROSCLEROSIS: ICD-10-CM

## 2019-01-01 DIAGNOSIS — K21.9 GASTROESOPHAGEAL REFLUX DISEASE WITHOUT ESOPHAGITIS: Chronic | ICD-10-CM

## 2019-01-01 DIAGNOSIS — I48.0 PAF (PAROXYSMAL ATRIAL FIBRILLATION): ICD-10-CM

## 2019-01-01 DIAGNOSIS — Z23 IMMUNIZATION DUE: ICD-10-CM

## 2019-01-01 DIAGNOSIS — Z00.00 ENCOUNTER FOR PREVENTIVE HEALTH EXAMINATION: Primary | ICD-10-CM

## 2019-01-01 DIAGNOSIS — N40.0 BENIGN NODULAR PROSTATIC HYPERPLASIA: ICD-10-CM

## 2019-01-01 DIAGNOSIS — E78.2 MIXED HYPERLIPIDEMIA: Primary | Chronic | ICD-10-CM

## 2019-01-01 DIAGNOSIS — J30.1 SEASONAL ALLERGIC RHINITIS DUE TO POLLEN: ICD-10-CM

## 2019-01-01 DIAGNOSIS — Z23 IMMUNIZATION DUE: Primary | ICD-10-CM

## 2019-01-01 DIAGNOSIS — I63.449 CEREBRAL INFARCTION DUE TO EMBOLISM OF UNSPECIFIED CEREBELLAR ARTERY: ICD-10-CM

## 2019-01-01 DIAGNOSIS — N40.1 BPH WITH URINARY OBSTRUCTION: ICD-10-CM

## 2019-01-01 DIAGNOSIS — Z71.84 TRAVEL ADVICE ENCOUNTER: ICD-10-CM

## 2019-01-01 DIAGNOSIS — M85.80 OSTEOPENIA, UNSPECIFIED LOCATION: ICD-10-CM

## 2019-01-01 DIAGNOSIS — N13.8 BPH WITH URINARY OBSTRUCTION: Primary | ICD-10-CM

## 2019-01-01 DIAGNOSIS — G47.31 COMPLEX SLEEP APNEA SYNDROME: ICD-10-CM

## 2019-01-01 PROCEDURE — 90471 IMMUNIZATION ADMIN: CPT | Mod: S$GLB,,, | Performed by: INTERNAL MEDICINE

## 2019-01-01 PROCEDURE — 76770 US RETROPERITONEAL COMPLETE: ICD-10-PCS | Mod: 26,,, | Performed by: RADIOLOGY

## 2019-01-01 PROCEDURE — G0439 PPPS, SUBSEQ VISIT: HCPCS | Mod: S$GLB,,, | Performed by: NURSE PRACTITIONER

## 2019-01-01 PROCEDURE — G0439 PR MEDICARE ANNUAL WELLNESS SUBSEQUENT VISIT: ICD-10-PCS | Mod: S$GLB,,, | Performed by: NURSE PRACTITIONER

## 2019-01-01 PROCEDURE — 99999 PR PBB SHADOW E&M-EST. PATIENT-LVL I: CPT | Mod: PBBFAC,,,

## 2019-01-01 PROCEDURE — 90662 IIV NO PRSV INCREASED AG IM: CPT | Mod: PBBFAC

## 2019-01-01 PROCEDURE — 99401 PR PREVENT COUNSEL,INDIV,15 MIN: ICD-10-PCS | Mod: S$GLB,,, | Performed by: INTERNAL MEDICINE

## 2019-01-01 PROCEDURE — 99999 PR PBB SHADOW E&M-EST. PATIENT-LVL IV: CPT | Mod: PBBFAC,,, | Performed by: NURSE PRACTITIONER

## 2019-01-01 PROCEDURE — 99211 OFF/OP EST MAY X REQ PHY/QHP: CPT | Mod: PBBFAC

## 2019-01-01 PROCEDURE — 90471 TYPHOID VICPS VACCINE IM: ICD-10-PCS | Mod: S$GLB,,, | Performed by: INTERNAL MEDICINE

## 2019-01-01 PROCEDURE — 76872 US TRANSRECTAL: CPT | Mod: 26,,, | Performed by: UROLOGY

## 2019-01-01 PROCEDURE — 76770 US EXAM ABDO BACK WALL COMP: CPT | Mod: TC

## 2019-01-01 PROCEDURE — 52000 CYSTOURETHROSCOPY: CPT

## 2019-01-01 PROCEDURE — 99999 PR PBB SHADOW E&M-EST. PATIENT-LVL III: ICD-10-PCS | Mod: PBBFAC,,, | Performed by: INTERNAL MEDICINE

## 2019-01-01 PROCEDURE — 99214 OFFICE O/P EST MOD 30 MIN: CPT | Mod: PBBFAC,27 | Performed by: NURSE PRACTITIONER

## 2019-01-01 PROCEDURE — 52000 CYSTOURETHROSCOPY: CPT | Mod: ,,, | Performed by: UROLOGY

## 2019-01-01 PROCEDURE — 90691 TYPHOID VACCINE IM: CPT | Mod: S$GLB,,, | Performed by: INTERNAL MEDICINE

## 2019-01-01 PROCEDURE — 76770 US EXAM ABDO BACK WALL COMP: CPT | Mod: 26,,, | Performed by: RADIOLOGY

## 2019-01-01 PROCEDURE — 99401 PREV MED CNSL INDIV APPRX 15: CPT | Mod: S$GLB,,, | Performed by: INTERNAL MEDICINE

## 2019-01-01 PROCEDURE — 90691 TYPHOID VICPS VACCINE IM: ICD-10-PCS | Mod: S$GLB,,, | Performed by: INTERNAL MEDICINE

## 2019-01-01 PROCEDURE — 76872 PR US TRANSRECTAL: ICD-10-PCS | Mod: 26,,, | Performed by: UROLOGY

## 2019-01-01 PROCEDURE — 99999 PR PBB SHADOW E&M-EST. PATIENT-LVL III: CPT | Mod: PBBFAC,,, | Performed by: INTERNAL MEDICINE

## 2019-01-01 PROCEDURE — 52000 PR CYSTOURETHROSCOPY: ICD-10-PCS | Mod: ,,, | Performed by: UROLOGY

## 2019-01-01 PROCEDURE — 99999 PR PBB SHADOW E&M-EST. PATIENT-LVL I: ICD-10-PCS | Mod: PBBFAC,,,

## 2019-01-01 PROCEDURE — 99999 PR PBB SHADOW E&M-EST. PATIENT-LVL IV: ICD-10-PCS | Mod: PBBFAC,,, | Performed by: NURSE PRACTITIONER

## 2019-01-01 PROCEDURE — 76872 US TRANSRECTAL: CPT

## 2019-01-01 RX ORDER — FLUTICASONE PROPIONATE 50 MCG
2 SPRAY, SUSPENSION (ML) NASAL DAILY
Qty: 3 BOTTLE | Refills: 4 | Status: SHIPPED | OUTPATIENT
Start: 2019-01-01

## 2019-01-01 RX ORDER — CIPROFLOXACIN 500 MG/1
500 TABLET ORAL ONCE
Status: COMPLETED | OUTPATIENT
Start: 2019-01-01 | End: 2019-01-01

## 2019-01-01 RX ORDER — LIDOCAINE HYDROCHLORIDE 10 MG/ML
10 INJECTION INFILTRATION; PERINEURAL ONCE
Status: CANCELLED | OUTPATIENT
Start: 2019-01-01 | End: 2019-01-01

## 2019-01-01 RX ORDER — LIDOCAINE HYDROCHLORIDE 20 MG/ML
JELLY TOPICAL ONCE
Status: CANCELLED | OUTPATIENT
Start: 2019-01-01 | End: 2019-01-01

## 2019-01-01 RX ORDER — ATORVASTATIN CALCIUM 40 MG/1
TABLET, FILM COATED ORAL
Qty: 90 TABLET | Refills: 3 | Status: SHIPPED | OUTPATIENT
Start: 2019-01-01

## 2019-01-01 RX ORDER — FINASTERIDE 5 MG/1
TABLET, FILM COATED ORAL
Qty: 90 TABLET | Refills: 3 | Status: SHIPPED | OUTPATIENT
Start: 2019-01-01

## 2019-01-01 RX ORDER — METOPROLOL TARTRATE 25 MG/1
TABLET, FILM COATED ORAL
Qty: 90 TABLET | Refills: 11 | Status: SHIPPED | OUTPATIENT
Start: 2019-01-01

## 2019-01-01 RX ORDER — ATOVAQUONE AND PROGUANIL HYDROCHLORIDE 250; 100 MG/1; MG/1
TABLET, FILM COATED ORAL
Qty: 19 TABLET | Refills: 0 | Status: SHIPPED | OUTPATIENT
Start: 2019-01-01 | End: 2019-01-01 | Stop reason: ALTCHOICE

## 2019-01-01 RX ORDER — APIXABAN 5 MG/1
TABLET, FILM COATED ORAL
Qty: 180 TABLET | Refills: 3 | Status: SHIPPED | OUTPATIENT
Start: 2019-01-01

## 2019-01-01 RX ORDER — OMEPRAZOLE 40 MG/1
40 CAPSULE, DELAYED RELEASE ORAL DAILY
Qty: 90 CAPSULE | Refills: 3 | Status: SHIPPED | OUTPATIENT
Start: 2019-01-01

## 2019-01-01 RX ORDER — LIDOCAINE HYDROCHLORIDE 20 MG/ML
JELLY TOPICAL ONCE
Status: COMPLETED | OUTPATIENT
Start: 2019-01-01 | End: 2019-01-01

## 2019-01-01 RX ORDER — MIRTAZAPINE 15 MG/1
15 TABLET, FILM COATED ORAL NIGHTLY
Qty: 90 TABLET | Refills: 1 | Status: SHIPPED | OUTPATIENT
Start: 2019-01-01 | End: 2020-01-01

## 2019-01-01 RX ORDER — DOCUSATE SODIUM 100 MG/1
100 CAPSULE, LIQUID FILLED ORAL NIGHTLY
COMMUNITY

## 2019-01-01 RX ORDER — DOXYCYCLINE HYCLATE 100 MG
TABLET ORAL
Refills: 0 | COMMUNITY
Start: 2019-01-01 | End: 2019-01-01 | Stop reason: ALTCHOICE

## 2019-01-01 RX ORDER — TAMSULOSIN HYDROCHLORIDE 0.4 MG/1
CAPSULE ORAL
Qty: 90 CAPSULE | Refills: 3 | Status: SHIPPED | OUTPATIENT
Start: 2019-01-01 | End: 2020-01-01 | Stop reason: SDUPTHER

## 2019-01-01 RX ADMIN — CIPROFLOXACIN 500 MG: 500 TABLET ORAL at 01:04

## 2019-01-01 RX ADMIN — LIDOCAINE HYDROCHLORIDE 30 ML: 20 JELLY TOPICAL at 01:04

## 2019-01-02 DIAGNOSIS — I48.0 PAF (PAROXYSMAL ATRIAL FIBRILLATION): ICD-10-CM

## 2019-03-06 ENCOUNTER — OFFICE VISIT (OUTPATIENT)
Dept: INTERNAL MEDICINE | Facility: CLINIC | Age: 79
End: 2019-03-06
Payer: MEDICARE

## 2019-03-06 VITALS
HEIGHT: 67 IN | SYSTOLIC BLOOD PRESSURE: 126 MMHG | HEART RATE: 58 BPM | BODY MASS INDEX: 31.94 KG/M2 | OXYGEN SATURATION: 95 % | WEIGHT: 203.5 LBS | DIASTOLIC BLOOD PRESSURE: 70 MMHG

## 2019-03-06 DIAGNOSIS — G25.0 ESSENTIAL TREMOR: Chronic | ICD-10-CM

## 2019-03-06 DIAGNOSIS — Z79.01 CHRONIC ANTICOAGULATION: Chronic | ICD-10-CM

## 2019-03-06 DIAGNOSIS — M85.80 OSTEOPENIA, UNSPECIFIED LOCATION: ICD-10-CM

## 2019-03-06 DIAGNOSIS — Z71.84 TRAVEL ADVICE ENCOUNTER: ICD-10-CM

## 2019-03-06 DIAGNOSIS — E78.2 MIXED HYPERLIPIDEMIA: Chronic | ICD-10-CM

## 2019-03-06 DIAGNOSIS — Z86.73 HISTORY OF STROKE: ICD-10-CM

## 2019-03-06 DIAGNOSIS — I48.0 PAF (PAROXYSMAL ATRIAL FIBRILLATION): ICD-10-CM

## 2019-03-06 DIAGNOSIS — N40.0 BENIGN NODULAR PROSTATIC HYPERPLASIA: Primary | ICD-10-CM

## 2019-03-06 DIAGNOSIS — G47.31 COMPLEX SLEEP APNEA SYNDROME: ICD-10-CM

## 2019-03-06 PROCEDURE — 99214 OFFICE O/P EST MOD 30 MIN: CPT | Mod: PBBFAC | Performed by: INTERNAL MEDICINE

## 2019-03-06 PROCEDURE — 99214 OFFICE O/P EST MOD 30 MIN: CPT | Mod: S$PBB,,, | Performed by: INTERNAL MEDICINE

## 2019-03-06 PROCEDURE — 99214 PR OFFICE/OUTPT VISIT, EST, LEVL IV, 30-39 MIN: ICD-10-PCS | Mod: S$PBB,,, | Performed by: INTERNAL MEDICINE

## 2019-03-06 PROCEDURE — 99999 PR PBB SHADOW E&M-EST. PATIENT-LVL IV: CPT | Mod: PBBFAC,,, | Performed by: INTERNAL MEDICINE

## 2019-03-06 PROCEDURE — 99999 PR PBB SHADOW E&M-EST. PATIENT-LVL IV: ICD-10-PCS | Mod: PBBFAC,,, | Performed by: INTERNAL MEDICINE

## 2019-03-06 NOTE — PROGRESS NOTES
"INTERNAL MEDICINE ESTABLISHED PATIENT VISIT NOTE    Subjective:     Chief Complaint: Follow-up  LE edema, discuss upcoming travel, c/o urinary issues     Patient ID: Taran Crowell is a 79 y.o. male with PMHx of embolic CVA in 5/2017 (at baseline has intermittent dysphagia prev evaluated by speech, denies recent issues).  Pt also c HLD, PAF on apixaban, BPH, GERD, seasonal allergies, LUNA on Bipap, baseline tremor on Remeron by Neuro (says Parkinson's w/u was neg), osteopenia, last seen by me in Dec, here today for f/u.    Today c c/o issues c "slow flow" of his urine.  Has been on Flomax and Proscar for BPH but has not seen urology in several years.  No hesitancy or dribbling.  No dysuria.  Gets up 1-2x/night to void.    Reports LE edema noted at last appt now improved.    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 No mass effect or hemorrhage. Additional remote lacunar type infarcts noted in this same region. Decreased signal in the distal right vertebral artery, suggesting hypoplasia or stenosis. The vertebral basilar system is left-sided dominant.    Chronic anticoagulation - on apixaban 5/29/2017    Essential tremor 5/29/2017    On Remeron    GERD (gastroesophageal reflux disease)     History of colon polyps 3/18/2014    Hypothyroidism due to acquired atrophy of thyroid 09/30/2015    Last on 2015.  None since then.    Memory loss last months.    Mixed hyperlipidemia 5/29/2017    PAF (paroxysmal atrial fibrillation) 6/16/2015       Home Medications:  Prior to Admission medications    Medication Sig Start Date End Date Taking? Authorizing Provider   acetaminophen (TYLENOL) 500 MG tablet Take 500 mg by mouth every 6 (six) " hours as needed for Pain.   Yes Historical Provider, MD   apixaban 5 mg Tab Take 1 tablet (5 mg total) by mouth 2 (two) times daily. 7/30/18  Yes Kemi Lopez MD   atorvastatin (LIPITOR) 40 MG tablet Take 1 tablet (40 mg total) by mouth once daily. 9/25/18  Yes Kemi Lopez MD   calcium carbonate/vitamin D3 (CALTRATE 600 + D ORAL) Take by mouth.   Yes Historical Provider, MD   docusate sodium (COLACE) 100 MG capsule Take 100 mg by mouth 2 (two) times daily.   Yes Historical Provider, MD   finasteride (PROSCAR) 5 mg tablet Take 1 tablet (5 mg total) by mouth once daily. 8/20/18 8/20/19 Yes Kemi Lopez MD   flecainide (TAMBOCOR) 50 MG Tab TAKE 1 TABLET EVERY 12 HOURS 12/18/18  Yes Vaughn Crowder MD   fluticasone (FLONASE) 50 mcg/actuation nasal spray 2 sprays by Each Nare route once daily. 5/29/17  Yes Juan Pablo Espino MD   metoprolol tartrate (LOPRESSOR) 25 MG tablet TAKE 1/2 TABLET TWICE DAILY 10/29/18  Yes Vaughn Crowder MD   mirtazapine (REMERON) 15 MG tablet Take 1 tablet (15 mg total) by mouth nightly. 12/5/18  Yes Kemi Lopez MD   mupirocin (BACTROBAN) 2 % ointment Apply topically 3 (three) times daily. 10/25/17  Yes Kemi Lopez MD   omeprazole (PRILOSEC) 40 MG capsule Take 1 capsule (40 mg total) by mouth once daily. 9/6/18  Yes Kemi Lopez MD   tamsulosin (FLOMAX) 0.4 mg Cap Take 1 capsule (0.4 mg total) by mouth every evening. 7/16/18  Yes Kemi Lopez MD       Allergies:  Review of patient's allergies indicates:  No Known Allergies    Social History:  Social History     Tobacco Use    Smoking status: Former Smoker     Types: Pipe    Smokeless tobacco: Former User    Tobacco comment: smoked pipe regularly once a day but quit 10-15 yrs ago   Substance Use Topics    Alcohol use: Yes     Comment: social    Drug use: No        Review of Systems   Constitutional: Negative for chills, fatigue and fever.   Respiratory: Negative for cough, chest tightness and shortness of breath.    Cardiovascular: Negative for chest pain.  "  Gastrointestinal: Negative for abdominal pain and blood in stool.   Genitourinary: Negative for dysuria and frequency.         Health Maintenance:     Immunizations:   Influenza 10/2018 at [facility]  TDap is up to date 5/2017  Pneumovax 10/2011, Prevnar 13 12/5/18  Zostavax is UTD.  12/2011  Shingrix rec but out of stock today     Cancer Screening:  Colonoscopy: is up to date. 3/2014  Int hemorrhoids, diverticula, otherwise normal c rec to repeat in 6-7 years.    Objective:   /70 (BP Location: Left arm, Patient Position: Sitting, BP Method: Large (Manual))   Pulse (!) 58   Ht 5' 7" (1.702 m)   Wt 92.3 kg (203 lb 7.8 oz)   SpO2 95%   BMI 31.87 kg/m²        General: AAO x3, no apparent distress  CV: Regular rhythm, mild bradycardia, no m/r/g  Pulm: Lungs CTAB, no crackles, no wheezes  Abd: s/NT/ND +BS  Extremities: trace edema B LE    Labs:     Lab Results   Component Value Date    WBC 8.34 07/17/2018    HGB 14.4 07/17/2018    HCT 43.9 07/17/2018    MCV 98 07/17/2018     07/17/2018     CMP  Sodium   Date Value Ref Range Status   11/05/2018 139 136 - 145 mmol/L Final     Potassium   Date Value Ref Range Status   11/05/2018 4.6 3.5 - 5.1 mmol/L Final     Chloride   Date Value Ref Range Status   11/05/2018 106 95 - 110 mmol/L Final     CO2   Date Value Ref Range Status   11/05/2018 27 23 - 29 mmol/L Final     Glucose   Date Value Ref Range Status   11/05/2018 96 70 - 110 mg/dL Final     BUN, Bld   Date Value Ref Range Status   11/05/2018 19 8 - 23 mg/dL Final     Creatinine   Date Value Ref Range Status   11/05/2018 1.0 0.5 - 1.4 mg/dL Final     Calcium   Date Value Ref Range Status   11/05/2018 9.2 8.7 - 10.5 mg/dL Final     Total Protein   Date Value Ref Range Status   11/05/2018 6.7 6.0 - 8.4 g/dL Final     Albumin   Date Value Ref Range Status   11/05/2018 4.1 3.5 - 5.2 g/dL Final     Total Bilirubin   Date Value Ref Range Status   11/05/2018 0.8 0.1 - 1.0 mg/dL Final     Comment:     For " infants and newborns, interpretation of results should be based  on gestational age, weight and in agreement with clinical  observations.  Premature Infant recommended reference ranges:  Up to 24 hours.............<8.0 mg/dL  Up to 48 hours............<12.0 mg/dL  3-5 days..................<15.0 mg/dL  6-29 days.................<15.0 mg/dL       Alkaline Phosphatase   Date Value Ref Range Status   11/05/2018 43 (L) 55 - 135 U/L Final     AST   Date Value Ref Range Status   11/05/2018 22 10 - 40 U/L Final     ALT   Date Value Ref Range Status   11/05/2018 14 10 - 44 U/L Final     Anion Gap   Date Value Ref Range Status   11/05/2018 6 (L) 8 - 16 mmol/L Final     eGFR if    Date Value Ref Range Status   11/05/2018 >60 >60 mL/min/1.73 m^2 Final     eGFR if non    Date Value Ref Range Status   11/05/2018 >60 >60 mL/min/1.73 m^2 Final     Comment:     Calculation used to obtain the estimated glomerular filtration  rate (eGFR) is the CKD-EPI equation.        Lab Results   Component Value Date    LDLCALC 86.2 01/26/2018     Lab Results   Component Value Date    HGBA1C 5.5 05/22/2017     Lab Results   Component Value Date    TSH 2.081 11/05/2018         Assessment/Plan     Taran was seen today for follow-up.    Diagnoses and all orders for this visit:    Benign nodular prostatic hyperplasia  Progressive sx despite dual therapy, will refer to Urology for f/u to reassess  -     Ambulatory Referral to Urology    PAF (paroxysmal atrial fibrillation)  Chronic anticoagulation - on apixaban  Rate controlled on flecainide and metoprolol, mgmt as per Dr. Crowder, cont Eliquis for stroke prevention    History of stroke  No acute issues, cont secondary prevention  On eliquis, bp has been well controlled  Cont statin    Complex sleep apnea syndrome  Stable, cont cpap    Mixed hyperlipidemia  Lab Results   Component Value Date    LDLCALC 86.2 01/26/2018   at goal on lipitor, cont meds    Essential  tremor  On Remeron as per Neuro, previous parkinsons w/u neg, ok to cont meds    Travel advice encounter  Likely due for typhoid booster and will need yellow fever vacc if available, will also need malaria prophylaxis, will refer to travel clinic for vacc and meds since traveling to several countries.  tdap and hep vacc utd  -     Ambulatory Referral to Travel Clinic      HM as above  RTC in 6 mos for f/u, sooner if needed.    Kemi Lopez MD  Department of Internal Medicine - [facility]  03/06/2019

## 2019-03-14 ENCOUNTER — OFFICE VISIT (OUTPATIENT)
Dept: UROLOGY | Facility: CLINIC | Age: 79
End: 2019-03-14
Payer: MEDICARE

## 2019-03-14 VITALS
DIASTOLIC BLOOD PRESSURE: 78 MMHG | HEIGHT: 67 IN | WEIGHT: 201.75 LBS | SYSTOLIC BLOOD PRESSURE: 133 MMHG | HEART RATE: 57 BPM | BODY MASS INDEX: 31.66 KG/M2

## 2019-03-14 DIAGNOSIS — N13.8 BPH WITH URINARY OBSTRUCTION: Primary | ICD-10-CM

## 2019-03-14 DIAGNOSIS — N40.1 BPH WITH URINARY OBSTRUCTION: Primary | ICD-10-CM

## 2019-03-14 PROCEDURE — 99214 OFFICE O/P EST MOD 30 MIN: CPT | Mod: S$PBB,,, | Performed by: UROLOGY

## 2019-03-14 PROCEDURE — 99999 PR PBB SHADOW E&M-EST. PATIENT-LVL III: ICD-10-PCS | Mod: PBBFAC,,, | Performed by: UROLOGY

## 2019-03-14 PROCEDURE — 99214 PR OFFICE/OUTPT VISIT, EST, LEVL IV, 30-39 MIN: ICD-10-PCS | Mod: S$PBB,,, | Performed by: UROLOGY

## 2019-03-14 PROCEDURE — 99999 PR PBB SHADOW E&M-EST. PATIENT-LVL III: CPT | Mod: PBBFAC,,, | Performed by: UROLOGY

## 2019-03-14 PROCEDURE — 99213 OFFICE O/P EST LOW 20 MIN: CPT | Mod: PBBFAC | Performed by: UROLOGY

## 2019-03-14 NOTE — PROGRESS NOTES
Subjective:       Patient ID: Taran Crowell is a 79 y.o. male.    Chief Complaint: Benign Nodular Prostatic Hypertrophy    HPI    Taran Crowell is a 79 y.o. male with PMHx of BPH here for his annual prostate evaluation. Notes he has been experiences slow flow and difficulty urinating at the end of his stream. He takes Flomax, Proscar, and Eloquis.     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 No mass effect or hemorrhage. Additional remote lacunar type infarcts noted in this same region. Decreased signal in the distal right vertebral artery, suggesting hypoplasia or stenosis. The vertebral basilar system is left-sided dominant.    Chronic anticoagulation - on apixaban 5/29/2017    Essential tremor 5/29/2017    On Remeron    GERD (gastroesophageal reflux disease)     History of colon polyps 3/18/2014    Hypothyroidism due to acquired atrophy of thyroid 09/30/2015    Last on 2015.  None since then.    Memory loss last months.    Mixed hyperlipidemia 5/29/2017    PAF (paroxysmal atrial fibrillation) 6/16/2015       Past Surgical History:   Procedure Laterality Date    COLONOSCOPY  6/23/2000  ([place])    Internal hemorrhoids, otherwise normal exam.    COLONOSCOPY N/A 3/18/2014    Performed by Vipul Bucio Jr., [facility] at [facility]    COLONOSCOPY W/ POLYPECTOMY  2/12/2010  [facility]    One less than 1 mm polyp in the distal sigmoid.  TUBULAR ADENOMA.    One less than 1 mm polyp in the cecum.  TUBULAR ADENOMA.    Non-bleeding internal hemorrhoids.       UPPER GASTROINTESTINAL ENDOSCOPY  2/7/2007  (Dr. Bourgeois)    Grade 1 reflux esophagitis.  Small/medium hiatal hernia.  Pylorus erythema.    UPPER GASTROINTESTINAL ENDOSCOPY  2/12/2010  [facility]     Slight reflux esophagitis.  Z-line regular, 30 cm from the incisors.   Moderate / large hiatus hernia.  Normal stomach and duodenum.  SELAM Test  Negative.        Family History   Problem Relation Age of Onset    Cancer Father         sarcoma,  of    Colon cancer Neg Hx     Colon polyps Neg Hx     Esophageal cancer Neg Hx     Stomach cancer Neg Hx        Social History     Socioeconomic History    Marital status:      Spouse name: Not on file    Number of children: Not on file    Years of education: Not on file    Highest education level: Not on file   Social Needs    Financial resource strain: Not on file    Food insecurity - worry: Not on file    Food insecurity - inability: Not on file    Transportation needs - medical: Not on file    Transportation needs - non-medical: Not on file   Occupational History    Occupation: retired      Comment: retired     Occupation: lived in [place] x 8 years    Occupation: teaches criminal law at [employer]     Comment: volunteer   Tobacco Use    Smoking status: Former Smoker     Types: Pipe    Smokeless tobacco: Former User    Tobacco comment: smoked pipe regularly once a day but quit 10-15 yrs ago   Substance and Sexual Activity    Alcohol use: Yes     Comment: social    Drug use: No    Sexual activity: Not on file   Other Topics Concern    Not on file   Social History Narrative        3 children    Retired-       Allergies:  Patient has no known allergies.    Medications:    Current Outpatient Medications:     acetaminophen (TYLENOL) 500 MG tablet, Take 500 mg by mouth every 6 (six) hours as needed for Pain., Disp: , Rfl:     apixaban 5 mg Tab, Take 1 tablet (5 mg total) by mouth 2 (two) times daily., Disp: 180 tablet, Rfl: 3    atorvastatin (LIPITOR) 40 MG tablet, Take 1 tablet (40 mg total) by mouth once daily., Disp: 90 tablet, Rfl: 3    calcium carbonate/vitamin D3 (CALTRATE 600 + D ORAL), Take by mouth., Disp: ,  Rfl:     docusate sodium (COLACE) 100 MG capsule, Take 100 mg by mouth 2 (two) times daily., Disp: , Rfl:     finasteride (PROSCAR) 5 mg tablet, Take 1 tablet (5 mg total) by mouth once daily., Disp: 90 tablet, Rfl: 3    flecainide (TAMBOCOR) 50 MG Tab, TAKE 1 TABLET EVERY 12 HOURS, Disp: 180 tablet, Rfl: 11    fluticasone (FLONASE) 50 mcg/actuation nasal spray, 2 sprays by Each Nare route once daily., Disp: 3 Bottle, Rfl: 4    metoprolol tartrate (LOPRESSOR) 25 MG tablet, TAKE 1/2 TABLET TWICE DAILY, Disp: 90 tablet, Rfl: 11    mirtazapine (REMERON) 15 MG tablet, Take 1 tablet (15 mg total) by mouth nightly., Disp: 90 tablet, Rfl: 1    mupirocin (BACTROBAN) 2 % ointment, Apply topically 3 (three) times daily., Disp: 15 g, Rfl: 0    omeprazole (PRILOSEC) 40 MG capsule, Take 1 capsule (40 mg total) by mouth once daily., Disp: 90 capsule, Rfl: 3    tamsulosin (FLOMAX) 0.4 mg Cap, Take 1 capsule (0.4 mg total) by mouth every evening., Disp: 90 capsule, Rfl: 3    Review of Systems   Constitutional: Negative for activity change, appetite change, chills, diaphoresis, fatigue, fever and unexpected weight change.   HENT: Negative for congestion, dental problem, hearing loss, mouth sores, postnasal drip, rhinorrhea, sinus pressure and trouble swallowing.    Eyes: Negative for pain, discharge and itching.   Respiratory: Negative for apnea, cough, choking, chest tightness, shortness of breath and wheezing.    Cardiovascular: Negative for chest pain, palpitations and leg swelling.   Gastrointestinal: Negative for abdominal distention, abdominal pain, anal bleeding, blood in stool, constipation, diarrhea, nausea, rectal pain and vomiting.   Endocrine: Negative for polydipsia and polyuria.   Genitourinary: Positive for difficulty urinating. Negative for decreased urine volume, discharge, dysuria, enuresis, flank pain, frequency, genital sores, hematuria, penile pain, penile swelling and scrotal swelling.        Slow  flow.   Musculoskeletal: Negative for arthralgias, back pain and myalgias.   Skin: Negative for color change, rash and wound.   Neurological: Negative for dizziness, syncope, speech difficulty, light-headedness and headaches.   Hematological: Negative for adenopathy. Does not bruise/bleed easily.   Psychiatric/Behavioral: Negative for behavioral problems, confusion and sleep disturbance.       Objective:      Physical Exam   Constitutional: He appears well-developed.   HENT:   Head: Normocephalic.   Neck: Neck supple.   Cardiovascular: Normal rate.    Pulmonary/Chest: Effort normal.   Abdominal: Soft.   Genitourinary:   Genitourinary Comments: Urine dip is negative.  Prostate was smooth without nodularity. No rectal masses. 30 grams. Normal perineum.     Neurological: He is alert.   Skin: Skin is warm.     Psychiatric: He has a normal mood and affect.       Assessment:       1. BPH with urinary obstruction        Plan:       Taran was seen today for benign nodular prostatic hypertrophy.    Diagnoses and all orders for this visit:    BPH with urinary obstruction  -     US Retroperitoneal Complete (Kidney and; Future  -     Cystoscopy; Future  -     Transrectal ultrasound; Future          Continue Flomax and Proscar.  Discussed pros and cons of REZUM.  Renal US and will phone review results with patient.  Schedule cystoscopy.  Schedule TRUS no biopsy.    IAlvaro, am acting as a scribe on this patient encounter in the presence and under the supervision of Dr. Johnson.    03/14/2019 8:37 AM    I, Dr. Johnson, personally performed the services described in this documentation.   All medical record entries made by the scribe were at my direction and in my presence.   I have reviewed the chart and agree that the record is accurate and complete.   Neil Johnson MD.  8:58 AM 03/14/2019

## 2019-03-14 NOTE — LETTER
March 14, 2019      Kemi Lopez MD  [address]  [place] [address]           [facility] - Urology 4th Floor  [address]  [place] [address]  Phone: [phone]          Patient: Taran Crowell   MR Number: [id number]   YOB: 1940   Date of Visit: 3/14/2019       Dear Dr. Hall:    Thank you for referring Taran Crowell to me for evaluation. Attached you will find relevant portions of my assessment and plan of care.    If you have questions, please do not hesitate to call me. I look forward to following Taran Crowell along with you.    Sincerely,    Neil Johnson Jr., MD    Enclosure  CC:  No Recipients    If you would like to receive this communication electronically, please contact [email or web address] or [phone] to request more information on [employer] Link access.    For providers and/or their staff who would like to refer a patient to [facility], please contact us through our one-stop-shop provider referral line, [facility] , at [phone].    If you feel you have received this communication in error or would no longer like to receive these types of communications, please e-mail [email or web address]

## 2019-04-08 NOTE — OP NOTE
DATE OF PROCEDURE:  04/08/2019.    PREOPERATIVE DIAGNOSIS:  BPH with outlet obstruction.    POSTOPERATIVE DIAGNOSIS:  BPH with outlet obstruction.    OPERATION PERFORMED:  Flexible cystoscopy.    PROCEDURE IN DETAIL:  The patient was prepped and draped in supine position.    Xylocaine jelly was instilled per urethra.  The anterior urethra was normal.    Prostatic urethra was 4 cm with mild lateral lobe enlargement.  There was no   evidence of enlarged median lobe.  Bladder was heavily trabeculated.  There were   no stones, tumors, foreign bodies or active infections noted.  Both ureteral   orifices were normal size, shape and position.  Transrectal prostate   ultrasonography had been done demonstrating a 9.8 g prostate.    IMPRESSION:  Minimal BPH with outlet obstruction.    RECOMMENDATIONS:  The patient's voiding symptoms have improved and I will   increase the Flomax to twice a day and follow his symptoms.  If the patient's   symptoms worsen, I would be hesitant to do a TURP.  I would schedule him for   urodynamics and consider a TUIP if urodynamics demonstrated evidence of outlet   obstruction.      JENNIFER  dd: 04/08/2019 13:47:51 (CDT)  td: 04/08/2019 16:17:17 (CDT)  Doc ID   #[id number]  Job ID #[id number]    CC:

## 2019-04-08 NOTE — OP NOTE
DATE OF PROCEDURE:  04/08/2019    PREOPERATIVE DIAGNOSIS:  BPH with outlet obstruction.    POSTOPERATIVE DIAGNOSIS:  Lower urinary tract symptoms.    OPERATION PERFORMED:  Transrectal prostate ultrasound, no biopsy.    PROCEDURE IN DETAIL:  The patient was prepped and draped in the left lateral   decubitus position.  Sagittal and transverse views of the prostate were made.    No hypo or hyperechoic lesions were noted.  PSA was 0.72.  Prostate measured 3.0   x 1.6 x 3.8 cm for a total volume of 9.8 g.  PSA density was 0.07.  Cystoscopy   done earlier today demonstrated a heavily trabeculated bladder and a mildly   obstructing prostate.  The patient's symptoms have improved.    IMPRESSION:  Mild BPH with outlet obstruction with lower urinary tract symptoms.    RECOMMENDATIONS:  We would not recommend surgery at this time.  The patient's   symptoms are better.  He will increase his Flomax to twice a day.  I will follow   his symptoms in the office; if his symptoms worsen, then he needs simple   urodynamics.      JENNIFER  dd: 04/08/2019 13:50:16 (CDT)  td: 04/08/2019 16:24:05 (CDLAMONT)  Doc ID   #[id number]  Job ID #[id number]    CC:

## 2019-04-08 NOTE — PATIENT INSTRUCTIONS
What to Expect After a Cystoscopy  For the next 24-48 hours, you may feel a mild burning when you urinate. This burning is normal and expected. Drink plenty of water to dilute the urine to help relieve the burning sensation. You may also see a small amount of blood in your urine after the procedure.    Unless you are already taking antibiotics, you may be given an antibiotic after the test to prevent infection.    Signs and Symptoms to Report  Call the [facility] at [phone] if you develop any of the following:  · Fever of 101 degrees or higher  · Chills or persistent bleeding  · Inability to urinate

## 2019-04-10 NOTE — PROGRESS NOTES
Subjective:      Chief Complaint:   Chief Complaint   Patient presents with    Travel Consult     History of Present Illness    Patient  79 y.o. male who presents today for routine pretravel consultation.  The patient reports a past medical history of recent cystocopy, stroke in 2017, GERD, hyperlipidemia.  He is currently on a blood thinner (apixaban).  The patient reports the following medication allergies; none.  The patient reports the following food allergies; none .  The patient will be traveling to  [facility] on April 21.  The patient will be at this destination for 26 days.  The patient will be lodging on a cruise ship most of the time.  They will spend 4 days in [facility] and 4 days in [facility].  The patient has travelled to the following other countries in the past; [place] ([place]  The patient reports that they received all their childhood vaccinations.  The patient reports receipt of the following travel related vaccinations; hepatitis a X2 and hepatitis b X3.  The purpose of this trip is for vacation.    Review of Systems   All other systems reviewed and are negative.      Objective:   Physical Exam   Assessment:     Pre-Travel clinic assessment    Plan:   Patient specific risks:      Patient does not have any medical problems that would restrict his travel.    Destination specific risks:      -Infectious Disease risks:       Mosquito Borne pathogens:  Reviewed basic mosquito avoidance precautions including wearing long sleeve clothing and insect repellant.  Malarone for malaria prophylaxis for when he is [facility] and [facility].     Food Borne pathogens:  Reviewed basic hand, food and water sanitation precautions.  Patient instructed to take hand  on their trip.  Patient has had hepatitis A vaccine series.  Will give typhoid IM vaccine.     Blood Borne Pathogens:  Patient has had hepatitis b vaccine series.     Routine:  He received Tdap in 2017.

## 2019-04-10 NOTE — LETTER
April 19, 2019      Kemi Lopez MD  [address]  [facility] [address]           [facility]  [address]  [facility] [address]  Phone: [phone]  Fax: [phone]          Patient: Taran Crowell   MR Number: [id number]   YOB: 1940   Date of Visit: 4/10/2019       Dear Dr. Hall:    Thank you for referring Taran Crowell to me for evaluation. Attached you will find relevant portions of my assessment and plan of care.    If you have questions, please do not hesitate to call me. I look forward to following Taran Crowell along with you.    Sincerely,    Johnny Mcgovern MD    Enclosure  CC:  No Recipients    If you would like to receive this communication electronically, please contact [email or web address] or [phone] to request more information on [employer] Link access.    For providers and/or their staff who would like to refer a patient to [facility], please contact us through our one-stop-shop provider referral line, [facility], at [phone].    If you feel you have received this communication in error or would no longer like to receive these types of communications, please e-mail [email or web address]

## 2019-09-05 NOTE — PROGRESS NOTES
Chart review completed. Immunizations reconciled, HM modifiers updated, HM duplicate entries deleted, care team updated, old orders deleted. Pt due for lipid panel, order placed. Pt also due for shingles vaccine.

## 2019-09-19 PROBLEM — R03.0 ELEVATED BLOOD PRESSURE READING: Status: RESOLVED | Noted: 2018-01-02 | Resolved: 2019-01-01

## 2019-09-19 PROBLEM — I70.0 AORTIC ATHEROSCLEROSIS: Status: ACTIVE | Noted: 2019-01-01

## 2019-09-19 NOTE — PROGRESS NOTES
"Taran Crowell presented for a  Medicare AWV and comprehensive Health Risk Assessment today. The following components were reviewed and updated:    · Medical history  · Family History  · Social history  · Allergies and Current Medications  · Health Risk Assessment  · Health Maintenance  · Care Team     ** See Completed Assessments for Annual Wellness Visit within the encounter summary.**       The following assessments were completed:  · Living Situation  · CAGE  · Depression Screening  · Timed Get Up and Go  · Whisper Test  · Cognitive Function Screening  ·   ·   ·   · Nutrition Screening  · ADL Screening  · PAQ Screening    Vitals:    09/19/19 0813   BP: 118/80   BP Location: Right arm   Pulse: 68   Weight: 92 kg (202 lb 13.2 oz)   Height: 5' 7" (1.702 m)     Body mass index is 31.77 kg/m².  Physical Exam   Constitutional: He is oriented to person, place, and time.   Obese   HENT:   Head: Normocephalic.   Cardiovascular: Normal rate and regular rhythm.   Pulmonary/Chest: Effort normal and breath sounds normal.   Abdominal: Soft. Bowel sounds are normal. He exhibits mass.   Musculoskeletal:   Gait antalgic   Neurological: He is alert and oriented to person, place, and time.   Skin: Skin is warm and dry.   Psychiatric: He has a normal mood and affect.   Nursing note and vitals reviewed.        Diagnoses and health risks identified today and associated recommendations/orders:    1. Encounter for preventive health examination  Here for Health Risk Assessment/Annual Wellness Visit.  Health maintenance reviewed and updated. Follow up in one year.  Declined Shingrix    2. Aortic atherosclerosis  Chronic, stable on current medications. Noted CXR 5/29/17. Followed by PCP.    3. PAF (paroxysmal atrial fibrillation)  Chronic, stable on current medications. Followed by PCP, Cardiology.    4. Chronic anticoagulation - on apixaban  Chronic, stable. Followed by PCP, Cardiology.    5. History of stroke  Stable on current " medications. Followed by PCP, Neurology.    6. Mixed hyperlipidemia  Chronic, stable on current medications. Followed by PCP, Cardiology.    7. Essential tremor  Chronic, stable on current medication. Followed by PCP< Neurology.    8. Benign nodular prostatic hyperplasia  Chronic, stable on current medications. Followed by PCP, Urology.    9. Gastroesophageal reflux disease without esophagitis  Chronic, stable on current medication. Followed by PCP.    10. Osteopenia, unspecified location  Chronic, stable on current medications. Followed by PCP.    11. Complex sleep apnea syndrome  Chronic, stable with BiPAP. Followed by PCP, Sleep Medicine.      Provided Taran with a 5-10 year written screening schedule and personal prevention plan. Recommendations were developed using the USPSTF age appropriate recommendations. Education, counseling, and referrals were provided as needed. After Visit Summary printed and given to patient which includes a list of additional screenings\tests needed.    Follow up in 4 months (on 1/15/2020).with PCP    Shana Orr NP

## 2019-09-19 NOTE — PROGRESS NOTES
I offered to discuss end of life issues, including information on how to make advance directives that the patient could use to name someone who would make medical decisions on their behalf if they became too ill to make themselves.    ___Patient declined  _X_Patient reports that he has Advanced Directives

## 2019-09-19 NOTE — PATIENT INSTRUCTIONS
Counseling and Referral of Other Preventative  (Italic type indicates deductible and co-insurance are waived)    Patient Name: Taran Crowell  Today's Date: 9/19/2019    Health Maintenance       Date Due Completion Date    Lipid Panel 01/26/2019 1/26/2018    Influenza Vaccine (1) 09/01/2019 10/5/2018    Shingles Vaccine (2 of 3) 01/01/2021 (Originally 2/24/2012) 12/30/2011 - Obtain new shingles vaccine - SHINGRIX - when available    Pneumococcal Vaccine (65+ Low/Medium Risk) (2 of 2 - PPSV23) 12/05/2019 12/5/2018    TETANUS VACCINE 05/29/2027 5/29/2017    Override on 9/1/2012: Done        No orders of the defined types were placed in this encounter.    The following information is provided to all patients.  This information is to help you find resources for any of the problems found today that may be affecting your health:                Living healthy guide: www.[facility].[place].gov      Understanding Diabetes: www.diabetes.org      Eating healthy: www.cdc.gov/healthyweight      CDC home safety checklist: www.cdc.gov/steadi/patient.html      Agency on Aging: www.goea.[place].[facility]      Alcoholics anonymous (AA): www.aa.org      Physical Activity: www.anibal.nih.gov/bs6mgbb      Tobacco use: www.quitwithusla.org

## 2020-01-01 ENCOUNTER — CLINICAL SUPPORT (OUTPATIENT)
Dept: INTERNAL MEDICINE | Facility: CLINIC | Age: 80
End: 2020-01-01
Payer: MEDICARE

## 2020-01-01 ENCOUNTER — OFFICE VISIT (OUTPATIENT)
Dept: URGENT CARE | Facility: CLINIC | Age: 80
End: 2020-01-01
Payer: MEDICARE

## 2020-01-01 ENCOUNTER — TELEPHONE (OUTPATIENT)
Dept: ELECTROPHYSIOLOGY | Facility: CLINIC | Age: 80
End: 2020-01-01

## 2020-01-01 ENCOUNTER — PATIENT MESSAGE (OUTPATIENT)
Dept: INTERNAL MEDICINE | Facility: CLINIC | Age: 80
End: 2020-01-01

## 2020-01-01 ENCOUNTER — LAB VISIT (OUTPATIENT)
Dept: LAB | Facility: HOSPITAL | Age: 80
End: 2020-01-01
Attending: INTERNAL MEDICINE
Payer: MEDICARE

## 2020-01-01 ENCOUNTER — HOSPITAL ENCOUNTER (OUTPATIENT)
Dept: CARDIOLOGY | Facility: CLINIC | Age: 80
Discharge: HOME | End: 2020-03-03
Payer: MEDICARE

## 2020-01-01 ENCOUNTER — OFFICE VISIT (OUTPATIENT)
Dept: INTERNAL MEDICINE | Facility: CLINIC | Age: 80
End: 2020-01-01
Payer: MEDICARE

## 2020-01-01 ENCOUNTER — OFFICE VISIT (OUTPATIENT)
Dept: ELECTROPHYSIOLOGY | Facility: CLINIC | Age: 80
End: 2020-01-01
Payer: MEDICARE

## 2020-01-01 ENCOUNTER — HOSPITAL ENCOUNTER (INPATIENT)
Facility: HOSPITAL | Age: 80
LOS: 6 days | DRG: 189 | End: 2020-03-17
Attending: EMERGENCY MEDICINE | Admitting: EMERGENCY MEDICINE
Payer: MEDICARE

## 2020-01-01 ENCOUNTER — IMMUNIZATION (OUTPATIENT)
Dept: PHARMACY | Facility: CLINIC | Age: 80
End: 2020-01-01
Payer: MEDICARE

## 2020-01-01 ENCOUNTER — PATIENT MESSAGE (OUTPATIENT)
Dept: PHARMACY | Facility: CLINIC | Age: 80
End: 2020-01-01

## 2020-01-01 VITALS
SYSTOLIC BLOOD PRESSURE: 110 MMHG | HEIGHT: 67 IN | BODY MASS INDEX: 31.56 KG/M2 | HEART RATE: 65 BPM | DIASTOLIC BLOOD PRESSURE: 78 MMHG | WEIGHT: 201.06 LBS

## 2020-01-01 VITALS
OXYGEN SATURATION: 97 % | DIASTOLIC BLOOD PRESSURE: 72 MMHG | SYSTOLIC BLOOD PRESSURE: 128 MMHG | HEIGHT: 67 IN | HEART RATE: 55 BPM | WEIGHT: 199.94 LBS | BODY MASS INDEX: 31.38 KG/M2

## 2020-01-01 VITALS
SYSTOLIC BLOOD PRESSURE: 124 MMHG | DIASTOLIC BLOOD PRESSURE: 59 MMHG | RESPIRATION RATE: 27 BRPM | HEART RATE: 95 BPM | WEIGHT: 196.19 LBS | OXYGEN SATURATION: 18 % | HEIGHT: 69 IN | BODY MASS INDEX: 29.06 KG/M2 | TEMPERATURE: 97 F

## 2020-01-01 VITALS
BODY MASS INDEX: 31.56 KG/M2 | WEIGHT: 201.06 LBS | HEIGHT: 67 IN | OXYGEN SATURATION: 96 % | TEMPERATURE: 99 F | RESPIRATION RATE: 18 BRPM | SYSTOLIC BLOOD PRESSURE: 115 MMHG | DIASTOLIC BLOOD PRESSURE: 62 MMHG | HEART RATE: 81 BPM

## 2020-01-01 DIAGNOSIS — K62.5 BRBPR (BRIGHT RED BLOOD PER RECTUM): ICD-10-CM

## 2020-01-01 DIAGNOSIS — R06.09 DYSPNEA ON EXERTION: Primary | ICD-10-CM

## 2020-01-01 DIAGNOSIS — J18.9 PNEUMONIA DUE TO INFECTIOUS ORGANISM, UNSPECIFIED LATERALITY, UNSPECIFIED PART OF LUNG: ICD-10-CM

## 2020-01-01 DIAGNOSIS — R50.9 FEVER, UNSPECIFIED FEVER CAUSE: Primary | ICD-10-CM

## 2020-01-01 DIAGNOSIS — J96.01 ACUTE HYPOXEMIC RESPIRATORY FAILURE: ICD-10-CM

## 2020-01-01 DIAGNOSIS — M85.80 OSTEOPENIA, UNSPECIFIED LOCATION: ICD-10-CM

## 2020-01-01 DIAGNOSIS — E78.2 MIXED HYPERLIPIDEMIA: Chronic | ICD-10-CM

## 2020-01-01 DIAGNOSIS — I70.0 AORTIC ATHEROSCLEROSIS: ICD-10-CM

## 2020-01-01 DIAGNOSIS — R79.9 ABNORMAL FINDING OF BLOOD CHEMISTRY, UNSPECIFIED: ICD-10-CM

## 2020-01-01 DIAGNOSIS — G25.0 ESSENTIAL TREMOR: Chronic | ICD-10-CM

## 2020-01-01 DIAGNOSIS — I49.8 OTHER SPECIFIED CARDIAC ARRHYTHMIAS: Primary | ICD-10-CM

## 2020-01-01 DIAGNOSIS — I48.0 PAF (PAROXYSMAL ATRIAL FIBRILLATION): ICD-10-CM

## 2020-01-01 DIAGNOSIS — I48.0 PAF (PAROXYSMAL ATRIAL FIBRILLATION): Primary | ICD-10-CM

## 2020-01-01 DIAGNOSIS — Z79.01 CHRONIC ANTICOAGULATION: Chronic | ICD-10-CM

## 2020-01-01 DIAGNOSIS — K21.9 GASTROESOPHAGEAL REFLUX DISEASE WITHOUT ESOPHAGITIS: Chronic | ICD-10-CM

## 2020-01-01 DIAGNOSIS — Z12.11 SCREEN FOR COLON CANCER: ICD-10-CM

## 2020-01-01 DIAGNOSIS — G47.31 COMPLEX SLEEP APNEA SYNDROME: ICD-10-CM

## 2020-01-01 DIAGNOSIS — Z51.5 PALLIATIVE CARE ENCOUNTER: ICD-10-CM

## 2020-01-01 DIAGNOSIS — R60.9 1+ PITTING EDEMA: ICD-10-CM

## 2020-01-01 DIAGNOSIS — R42 DIZZINESS: Primary | ICD-10-CM

## 2020-01-01 DIAGNOSIS — Z71.89 ACP (ADVANCE CARE PLANNING): ICD-10-CM

## 2020-01-01 DIAGNOSIS — Z13.1 SCREENING FOR DIABETES MELLITUS: ICD-10-CM

## 2020-01-01 DIAGNOSIS — R06.00 DYSPNEA, UNSPECIFIED TYPE: ICD-10-CM

## 2020-01-01 DIAGNOSIS — I49.8 OTHER SPECIFIED CARDIAC ARRHYTHMIAS: ICD-10-CM

## 2020-01-01 DIAGNOSIS — Z86.73 HISTORY OF STROKE: ICD-10-CM

## 2020-01-01 DIAGNOSIS — N40.0 BENIGN NODULAR PROSTATIC HYPERPLASIA: ICD-10-CM

## 2020-01-01 DIAGNOSIS — J06.9 VIRAL URI: ICD-10-CM

## 2020-01-01 DIAGNOSIS — R94.31 QT PROLONGATION: ICD-10-CM

## 2020-01-01 LAB
ALBUMIN SERPL BCP-MCNC: 2.4 G/DL (ref 3.5–5.2)
ALBUMIN SERPL BCP-MCNC: 2.5 G/DL (ref 3.5–5.2)
ALBUMIN SERPL BCP-MCNC: 2.7 G/DL (ref 3.5–5.2)
ALBUMIN SERPL BCP-MCNC: 2.9 G/DL (ref 3.5–5.2)
ALBUMIN SERPL BCP-MCNC: 3 G/DL (ref 3.5–5.2)
ALBUMIN SERPL BCP-MCNC: 3.9 G/DL (ref 3.5–5.2)
ALBUMIN SERPL BCP-MCNC: 4.1 G/DL (ref 3.5–5.2)
ALLENS TEST: ABNORMAL
ALP SERPL-CCNC: 24 U/L (ref 55–135)
ALP SERPL-CCNC: 26 U/L (ref 55–135)
ALP SERPL-CCNC: 27 U/L (ref 55–135)
ALP SERPL-CCNC: 29 U/L (ref 55–135)
ALP SERPL-CCNC: 31 U/L (ref 55–135)
ALP SERPL-CCNC: 36 U/L (ref 55–135)
ALP SERPL-CCNC: 41 U/L (ref 55–135)
ALT SERPL W/O P-5'-P-CCNC: 11 U/L (ref 10–44)
ALT SERPL W/O P-5'-P-CCNC: 200 U/L (ref 10–44)
ALT SERPL W/O P-5'-P-CCNC: 43 U/L (ref 10–44)
ALT SERPL W/O P-5'-P-CCNC: 46 U/L (ref 10–44)
ALT SERPL W/O P-5'-P-CCNC: 73 U/L (ref 10–44)
ALT SERPL W/O P-5'-P-CCNC: 79 U/L (ref 10–44)
ALT SERPL W/O P-5'-P-CCNC: 96 U/L (ref 10–44)
ANION GAP SERPL CALC-SCNC: 10 MMOL/L (ref 8–16)
ANION GAP SERPL CALC-SCNC: 10 MMOL/L (ref 8–16)
ANION GAP SERPL CALC-SCNC: 11 MMOL/L (ref 8–16)
ANION GAP SERPL CALC-SCNC: 11 MMOL/L (ref 8–16)
ANION GAP SERPL CALC-SCNC: 13 MMOL/L (ref 8–16)
ANION GAP SERPL CALC-SCNC: 13 MMOL/L (ref 8–16)
ANION GAP SERPL CALC-SCNC: 14 MMOL/L (ref 8–16)
AST SERPL-CCNC: 130 U/L (ref 10–40)
AST SERPL-CCNC: 149 U/L (ref 10–40)
AST SERPL-CCNC: 166 U/L (ref 10–40)
AST SERPL-CCNC: 22 U/L (ref 10–40)
AST SERPL-CCNC: 254 U/L (ref 10–40)
AST SERPL-CCNC: 84 U/L (ref 10–40)
AST SERPL-CCNC: 90 U/L (ref 10–40)
BACTERIA #/AREA URNS AUTO: NORMAL /HPF
BACTERIA BLD CULT: NORMAL
BACTERIA BLD CULT: NORMAL
BACTERIA SPEC AEROBE CULT: NORMAL
BACTERIA SPEC AEROBE CULT: NORMAL
BASOPHILS # BLD AUTO: 0.01 K/UL (ref 0–0.2)
BASOPHILS # BLD AUTO: 0.02 K/UL (ref 0–0.2)
BASOPHILS # BLD AUTO: 0.05 K/UL (ref 0–0.2)
BASOPHILS NFR BLD: 0.1 % (ref 0–1.9)
BASOPHILS NFR BLD: 0.1 % (ref 0–1.9)
BASOPHILS NFR BLD: 0.2 % (ref 0–1.9)
BASOPHILS NFR BLD: 0.3 % (ref 0–1.9)
BASOPHILS NFR BLD: 0.3 % (ref 0–1.9)
BASOPHILS NFR BLD: 0.4 % (ref 0–1.9)
BILIRUB SERPL-MCNC: 0.4 MG/DL (ref 0.1–1)
BILIRUB SERPL-MCNC: 0.5 MG/DL (ref 0.1–1)
BILIRUB SERPL-MCNC: 0.6 MG/DL (ref 0.1–1)
BILIRUB SERPL-MCNC: 0.6 MG/DL (ref 0.1–1)
BILIRUB SERPL-MCNC: 0.7 MG/DL (ref 0.1–1)
BILIRUB SERPL-MCNC: 0.8 MG/DL (ref 0.1–1)
BILIRUB SERPL-MCNC: 0.9 MG/DL (ref 0.1–1)
BILIRUB UR QL STRIP: NEGATIVE
BUN SERPL-MCNC: 10 MG/DL (ref 8–23)
BUN SERPL-MCNC: 14 MG/DL (ref 8–23)
BUN SERPL-MCNC: 14 MG/DL (ref 8–23)
BUN SERPL-MCNC: 15 MG/DL (ref 8–23)
BUN SERPL-MCNC: 28 MG/DL (ref 8–23)
C DIFF GDH STL QL: NEGATIVE
C DIFF TOX A+B STL QL IA: NEGATIVE
CALCIUM SERPL-MCNC: 8.2 MG/DL (ref 8.7–10.5)
CALCIUM SERPL-MCNC: 8.7 MG/DL (ref 8.7–10.5)
CALCIUM SERPL-MCNC: 8.8 MG/DL (ref 8.7–10.5)
CALCIUM SERPL-MCNC: 8.9 MG/DL (ref 8.7–10.5)
CALCIUM SERPL-MCNC: 9.2 MG/DL (ref 8.7–10.5)
CALCIUM SERPL-MCNC: 9.4 MG/DL (ref 8.7–10.5)
CALCIUM SERPL-MCNC: 9.6 MG/DL (ref 8.7–10.5)
CHLORIDE SERPL-SCNC: 102 MMOL/L (ref 95–110)
CHLORIDE SERPL-SCNC: 102 MMOL/L (ref 95–110)
CHLORIDE SERPL-SCNC: 103 MMOL/L (ref 95–110)
CHLORIDE SERPL-SCNC: 103 MMOL/L (ref 95–110)
CHLORIDE SERPL-SCNC: 104 MMOL/L (ref 95–110)
CHLORIDE SERPL-SCNC: 104 MMOL/L (ref 95–110)
CHLORIDE SERPL-SCNC: 98 MMOL/L (ref 95–110)
CHOLEST SERPL-MCNC: 166 MG/DL (ref 120–199)
CHOLEST/HDLC SERPL: 2.3 {RATIO} (ref 2–5)
CLARITY UR REFRACT.AUTO: ABNORMAL
CO2 SERPL-SCNC: 21 MMOL/L (ref 23–29)
CO2 SERPL-SCNC: 22 MMOL/L (ref 23–29)
CO2 SERPL-SCNC: 23 MMOL/L (ref 23–29)
CO2 SERPL-SCNC: 26 MMOL/L (ref 23–29)
CO2 SERPL-SCNC: 27 MMOL/L (ref 23–29)
COLOR UR AUTO: YELLOW
CREAT SERPL-MCNC: 0.8 MG/DL (ref 0.5–1.4)
CREAT SERPL-MCNC: 1 MG/DL (ref 0.5–1.4)
CTP QC/QA: YES
DELSYS: ABNORMAL
DIFFERENTIAL METHOD: ABNORMAL
EOSINOPHIL # BLD AUTO: 0 K/UL (ref 0–0.5)
EOSINOPHIL # BLD AUTO: 0.3 K/UL (ref 0–0.5)
EOSINOPHIL NFR BLD: 0 % (ref 0–8)
EOSINOPHIL NFR BLD: 4 % (ref 0–8)
ERYTHROCYTE [DISTWIDTH] IN BLOOD BY AUTOMATED COUNT: 13.9 % (ref 11.5–14.5)
ERYTHROCYTE [DISTWIDTH] IN BLOOD BY AUTOMATED COUNT: 14 % (ref 11.5–14.5)
ERYTHROCYTE [DISTWIDTH] IN BLOOD BY AUTOMATED COUNT: 14.2 % (ref 11.5–14.5)
ERYTHROCYTE [DISTWIDTH] IN BLOOD BY AUTOMATED COUNT: 14.7 % (ref 11.5–14.5)
EST. GFR  (AFRICAN AMERICAN): >60 ML/MIN/1.73 M^2
EST. GFR  (NON AFRICAN AMERICAN): >60 ML/MIN/1.73 M^2
ESTIMATED AVG GLUCOSE: 108 MG/DL (ref 68–131)
FLOW: 7
FLUAV AG NPH QL: NEGATIVE
FLUBV AG NPH QL: NEGATIVE
GLUCOSE SERPL-MCNC: 101 MG/DL (ref 70–110)
GLUCOSE SERPL-MCNC: 117 MG/DL (ref 70–110)
GLUCOSE SERPL-MCNC: 120 MG/DL (ref 70–110)
GLUCOSE SERPL-MCNC: 152 MG/DL (ref 70–110)
GLUCOSE SERPL-MCNC: 170 MG/DL (ref 70–110)
GLUCOSE SERPL-MCNC: 90 MG/DL (ref 70–110)
GLUCOSE SERPL-MCNC: 94 MG/DL (ref 70–110)
GLUCOSE UR QL STRIP: NEGATIVE
GRAM STN SPEC: NORMAL
HBA1C MFR BLD HPLC: 5.4 % (ref 4–5.6)
HCO3 UR-SCNC: 17.8 MMOL/L (ref 24–28)
HCT VFR BLD AUTO: 38 % (ref 40–54)
HCT VFR BLD AUTO: 38 % (ref 40–54)
HCT VFR BLD AUTO: 38.3 % (ref 40–54)
HCT VFR BLD AUTO: 38.5 % (ref 40–54)
HCT VFR BLD AUTO: 38.5 % (ref 40–54)
HCT VFR BLD AUTO: 39.9 % (ref 40–54)
HCT VFR BLD AUTO: 42.3 % (ref 40–54)
HCT VFR BLD AUTO: 43.8 % (ref 40–54)
HCT VFR BLD CALC: 35 %PCV (ref 36–54)
HDLC SERPL-MCNC: 71 MG/DL (ref 40–75)
HDLC SERPL: 42.8 % (ref 20–50)
HEMOCCULT STL QL IA: NEGATIVE
HGB BLD-MCNC: 12.1 G/DL (ref 14–18)
HGB BLD-MCNC: 12.3 G/DL (ref 14–18)
HGB BLD-MCNC: 12.3 G/DL (ref 14–18)
HGB BLD-MCNC: 12.4 G/DL (ref 14–18)
HGB BLD-MCNC: 12.5 G/DL (ref 14–18)
HGB BLD-MCNC: 13 G/DL (ref 14–18)
HGB BLD-MCNC: 14 G/DL (ref 14–18)
HGB BLD-MCNC: 14.1 G/DL (ref 14–18)
HGB UR QL STRIP: NEGATIVE
HYALINE CASTS UR QL AUTO: 0 /LPF
IMM GRANULOCYTES # BLD AUTO: 0.02 K/UL (ref 0–0.04)
IMM GRANULOCYTES # BLD AUTO: 0.04 K/UL (ref 0–0.04)
IMM GRANULOCYTES # BLD AUTO: 0.06 K/UL (ref 0–0.04)
IMM GRANULOCYTES # BLD AUTO: 0.06 K/UL (ref 0–0.04)
IMM GRANULOCYTES # BLD AUTO: 0.08 K/UL (ref 0–0.04)
IMM GRANULOCYTES # BLD AUTO: 0.09 K/UL (ref 0–0.04)
IMM GRANULOCYTES # BLD AUTO: 0.18 K/UL (ref 0–0.04)
IMM GRANULOCYTES NFR BLD AUTO: 0.4 % (ref 0–0.5)
IMM GRANULOCYTES NFR BLD AUTO: 0.7 % (ref 0–0.5)
IMM GRANULOCYTES NFR BLD AUTO: 0.7 % (ref 0–0.5)
IMM GRANULOCYTES NFR BLD AUTO: 0.8 % (ref 0–0.5)
IMM GRANULOCYTES NFR BLD AUTO: 0.8 % (ref 0–0.5)
IMM GRANULOCYTES NFR BLD AUTO: 0.9 % (ref 0–0.5)
IMM GRANULOCYTES NFR BLD AUTO: 1.2 % (ref 0–0.5)
KETONES UR QL STRIP: NEGATIVE
LACTATE SERPL-SCNC: 0.9 MMOL/L (ref 0.5–2.2)
LACTATE SERPL-SCNC: 1 MMOL/L (ref 0.5–2.2)
LACTATE SERPL-SCNC: 2.3 MMOL/L (ref 0.5–2.2)
LDLC SERPL CALC-MCNC: 80.8 MG/DL (ref 63–159)
LEUKOCYTE ESTERASE UR QL STRIP: NEGATIVE
LYMPHOCYTES # BLD AUTO: 1 K/UL (ref 1–4.8)
LYMPHOCYTES # BLD AUTO: 1 K/UL (ref 1–4.8)
LYMPHOCYTES # BLD AUTO: 1.2 K/UL (ref 1–4.8)
LYMPHOCYTES # BLD AUTO: 1.3 K/UL (ref 1–4.8)
LYMPHOCYTES # BLD AUTO: 1.5 K/UL (ref 1–4.8)
LYMPHOCYTES # BLD AUTO: 2.6 K/UL (ref 1–4.8)
LYMPHOCYTES NFR BLD: 11 % (ref 18–48)
LYMPHOCYTES NFR BLD: 11 % (ref 18–48)
LYMPHOCYTES NFR BLD: 13.2 % (ref 18–48)
LYMPHOCYTES NFR BLD: 14.1 % (ref 18–48)
LYMPHOCYTES NFR BLD: 22.3 % (ref 18–48)
LYMPHOCYTES NFR BLD: 31 % (ref 18–48)
LYMPHOCYTES NFR BLD: 34 % (ref 18–48)
LYMPHOCYTES NFR BLD: 9.5 % (ref 18–48)
MAGNESIUM SERPL-MCNC: 1.5 MG/DL (ref 1.6–2.6)
MAGNESIUM SERPL-MCNC: 1.8 MG/DL (ref 1.6–2.6)
MAGNESIUM SERPL-MCNC: 1.8 MG/DL (ref 1.6–2.6)
MAGNESIUM SERPL-MCNC: 1.9 MG/DL (ref 1.6–2.6)
MAGNESIUM SERPL-MCNC: 2.1 MG/DL (ref 1.6–2.6)
MCH RBC QN AUTO: 31.1 PG (ref 27–31)
MCH RBC QN AUTO: 31.3 PG (ref 27–31)
MCH RBC QN AUTO: 31.6 PG (ref 27–31)
MCH RBC QN AUTO: 31.6 PG (ref 27–31)
MCH RBC QN AUTO: 31.9 PG (ref 27–31)
MCH RBC QN AUTO: 32.1 PG (ref 27–31)
MCHC RBC AUTO-ENTMCNC: 31.8 G/DL (ref 32–36)
MCHC RBC AUTO-ENTMCNC: 31.9 G/DL (ref 32–36)
MCHC RBC AUTO-ENTMCNC: 31.9 G/DL (ref 32–36)
MCHC RBC AUTO-ENTMCNC: 32.2 G/DL (ref 32–36)
MCHC RBC AUTO-ENTMCNC: 32.4 G/DL (ref 32–36)
MCHC RBC AUTO-ENTMCNC: 32.6 G/DL (ref 32–36)
MCHC RBC AUTO-ENTMCNC: 32.9 G/DL (ref 32–36)
MCHC RBC AUTO-ENTMCNC: 33.1 G/DL (ref 32–36)
MCV RBC AUTO: 96 FL (ref 82–98)
MCV RBC AUTO: 97 FL (ref 82–98)
MCV RBC AUTO: 98 FL (ref 82–98)
MICROSCOPIC COMMENT: NORMAL
MODE: ABNORMAL
MONOCYTES # BLD AUTO: 0.2 K/UL (ref 0.3–1)
MONOCYTES # BLD AUTO: 0.3 K/UL (ref 0.3–1)
MONOCYTES # BLD AUTO: 0.4 K/UL (ref 0.3–1)
MONOCYTES # BLD AUTO: 0.6 K/UL (ref 0.3–1)
MONOCYTES # BLD AUTO: 0.6 K/UL (ref 0.3–1)
MONOCYTES NFR BLD: 2.2 % (ref 4–15)
MONOCYTES NFR BLD: 3 % (ref 4–15)
MONOCYTES NFR BLD: 3.6 % (ref 4–15)
MONOCYTES NFR BLD: 3.8 % (ref 4–15)
MONOCYTES NFR BLD: 3.8 % (ref 4–15)
MONOCYTES NFR BLD: 5.8 % (ref 4–15)
MONOCYTES NFR BLD: 7.3 % (ref 4–15)
MONOCYTES NFR BLD: 7.7 % (ref 4–15)
NEUTROPHILS # BLD AUTO: 13.2 K/UL (ref 1.8–7.7)
NEUTROPHILS # BLD AUTO: 2.9 K/UL (ref 1.8–7.7)
NEUTROPHILS # BLD AUTO: 3.8 K/UL (ref 1.8–7.7)
NEUTROPHILS # BLD AUTO: 4.1 K/UL (ref 1.8–7.7)
NEUTROPHILS # BLD AUTO: 7.2 K/UL (ref 1.8–7.7)
NEUTROPHILS # BLD AUTO: 7.2 K/UL (ref 1.8–7.7)
NEUTROPHILS # BLD AUTO: 7.9 K/UL (ref 1.8–7.7)
NEUTROPHILS # BLD AUTO: 8.6 K/UL (ref 1.8–7.7)
NEUTROPHILS NFR BLD: 54.4 % (ref 38–73)
NEUTROPHILS NFR BLD: 60.1 % (ref 38–73)
NEUTROPHILS NFR BLD: 71.3 % (ref 38–73)
NEUTROPHILS NFR BLD: 81.9 % (ref 38–73)
NEUTROPHILS NFR BLD: 83.7 % (ref 38–73)
NEUTROPHILS NFR BLD: 84.3 % (ref 38–73)
NEUTROPHILS NFR BLD: 84.3 % (ref 38–73)
NEUTROPHILS NFR BLD: 85.4 % (ref 38–73)
NITRITE UR QL STRIP: NEGATIVE
NONHDLC SERPL-MCNC: 95 MG/DL
NRBC BLD-RTO: 0 /100 WBC
PCO2 BLDA: 23 MMHG (ref 35–45)
PH SMN: 7.5 [PH] (ref 7.35–7.45)
PH UR STRIP: 5 [PH] (ref 5–8)
PHOSPHATE SERPL-MCNC: 1.7 MG/DL (ref 2.7–4.5)
PHOSPHATE SERPL-MCNC: 3 MG/DL (ref 2.7–4.5)
PHOSPHATE SERPL-MCNC: 3.3 MG/DL (ref 2.7–4.5)
PHOSPHATE SERPL-MCNC: 3.4 MG/DL (ref 2.7–4.5)
PHOSPHATE SERPL-MCNC: 4.1 MG/DL (ref 2.7–4.5)
PLATELET # BLD AUTO: 106 K/UL (ref 150–350)
PLATELET # BLD AUTO: 124 K/UL (ref 150–350)
PLATELET # BLD AUTO: 141 K/UL (ref 150–350)
PLATELET # BLD AUTO: 141 K/UL (ref 150–350)
PLATELET # BLD AUTO: 150 K/UL (ref 150–350)
PLATELET # BLD AUTO: 181 K/UL (ref 150–350)
PLATELET # BLD AUTO: 191 K/UL (ref 150–350)
PLATELET # BLD AUTO: 241 K/UL (ref 150–350)
PMV BLD AUTO: 11.2 FL (ref 9.2–12.9)
PMV BLD AUTO: 11.4 FL (ref 9.2–12.9)
PMV BLD AUTO: 11.4 FL (ref 9.2–12.9)
PMV BLD AUTO: 11.7 FL (ref 9.2–12.9)
PMV BLD AUTO: 11.9 FL (ref 9.2–12.9)
PMV BLD AUTO: 12.1 FL (ref 9.2–12.9)
PMV BLD AUTO: 12.1 FL (ref 9.2–12.9)
PMV BLD AUTO: 12.3 FL (ref 9.2–12.9)
PO2 BLDA: 56 MMHG (ref 80–100)
POC BE: -5 MMOL/L
POC IONIZED CALCIUM: 1.14 MMOL/L (ref 1.06–1.42)
POC SATURATED O2: 92 % (ref 95–100)
POC TCO2: 19 MMOL/L (ref 23–27)
POTASSIUM BLD-SCNC: 3.1 MMOL/L (ref 3.5–5.1)
POTASSIUM SERPL-SCNC: 3.1 MMOL/L (ref 3.5–5.1)
POTASSIUM SERPL-SCNC: 3.5 MMOL/L (ref 3.5–5.1)
POTASSIUM SERPL-SCNC: 3.7 MMOL/L (ref 3.5–5.1)
POTASSIUM SERPL-SCNC: 3.8 MMOL/L (ref 3.5–5.1)
POTASSIUM SERPL-SCNC: 3.8 MMOL/L (ref 3.5–5.1)
POTASSIUM SERPL-SCNC: 4.2 MMOL/L (ref 3.5–5.1)
POTASSIUM SERPL-SCNC: 4.3 MMOL/L (ref 3.5–5.1)
PROT SERPL-MCNC: 6.1 G/DL (ref 6–8.4)
PROT SERPL-MCNC: 6.2 G/DL (ref 6–8.4)
PROT SERPL-MCNC: 6.2 G/DL (ref 6–8.4)
PROT SERPL-MCNC: 6.5 G/DL (ref 6–8.4)
PROT SERPL-MCNC: 6.5 G/DL (ref 6–8.4)
PROT SERPL-MCNC: 7.1 G/DL (ref 6–8.4)
PROT SERPL-MCNC: 7.7 G/DL (ref 6–8.4)
PROT UR QL STRIP: ABNORMAL
RBC # BLD AUTO: 3.87 M/UL (ref 4.6–6.2)
RBC # BLD AUTO: 3.9 M/UL (ref 4.6–6.2)
RBC # BLD AUTO: 3.93 M/UL (ref 4.6–6.2)
RBC # BLD AUTO: 3.95 M/UL (ref 4.6–6.2)
RBC # BLD AUTO: 3.95 M/UL (ref 4.6–6.2)
RBC # BLD AUTO: 4.12 M/UL (ref 4.6–6.2)
RBC # BLD AUTO: 4.39 M/UL (ref 4.6–6.2)
RBC # BLD AUTO: 4.54 M/UL (ref 4.6–6.2)
RBC #/AREA URNS AUTO: 1 /HPF (ref 0–4)
SAMPLE: ABNORMAL
SITE: ABNORMAL
SODIUM BLD-SCNC: 134 MMOL/L (ref 136–145)
SODIUM SERPL-SCNC: 134 MMOL/L (ref 136–145)
SODIUM SERPL-SCNC: 134 MMOL/L (ref 136–145)
SODIUM SERPL-SCNC: 136 MMOL/L (ref 136–145)
SODIUM SERPL-SCNC: 139 MMOL/L (ref 136–145)
SODIUM SERPL-SCNC: 139 MMOL/L (ref 136–145)
SODIUM SERPL-SCNC: 140 MMOL/L (ref 136–145)
SODIUM SERPL-SCNC: 141 MMOL/L (ref 136–145)
SP GR UR STRIP: 1.03 (ref 1–1.03)
SP02: 90
SQUAMOUS #/AREA URNS AUTO: 0 /HPF
TRIGL SERPL-MCNC: 71 MG/DL (ref 30–150)
URN SPEC COLLECT METH UR: ABNORMAL
VANCOMYCIN TROUGH SERPL-MCNC: 17.8 UG/ML (ref 10–22)
WBC # BLD AUTO: 10.44 K/UL (ref 3.9–12.7)
WBC # BLD AUTO: 15.41 K/UL (ref 3.9–12.7)
WBC # BLD AUTO: 4.81 K/UL (ref 3.9–12.7)
WBC # BLD AUTO: 5.33 K/UL (ref 3.9–12.7)
WBC # BLD AUTO: 7.54 K/UL (ref 3.9–12.7)
WBC # BLD AUTO: 8.6 K/UL (ref 3.9–12.7)
WBC # BLD AUTO: 8.6 K/UL (ref 3.9–12.7)
WBC # BLD AUTO: 9.49 K/UL (ref 3.9–12.7)
WBC #/AREA URNS AUTO: 3 /HPF (ref 0–5)

## 2020-01-01 PROCEDURE — 20000000 HC ICU ROOM

## 2020-01-01 PROCEDURE — 99999 PR PBB SHADOW E&M-EST. PATIENT-LVL I: CPT | Mod: PBBFAC,,,

## 2020-01-01 PROCEDURE — 93005 ELECTROCARDIOGRAM TRACING: CPT | Mod: PBBFAC | Performed by: INTERNAL MEDICINE

## 2020-01-01 PROCEDURE — 99211 OFF/OP EST MAY X REQ PHY/QHP: CPT | Mod: PBBFAC

## 2020-01-01 PROCEDURE — 99900035 HC TECH TIME PER 15 MIN (STAT)

## 2020-01-01 PROCEDURE — 99233 PR SUBSEQUENT HOSPITAL CARE,LEVL III: ICD-10-PCS | Mod: ,,, | Performed by: EMERGENCY MEDICINE

## 2020-01-01 PROCEDURE — 80053 COMPREHEN METABOLIC PANEL: CPT

## 2020-01-01 PROCEDURE — 99232 SBSQ HOSP IP/OBS MODERATE 35: CPT | Mod: ,,, | Performed by: INTERNAL MEDICINE

## 2020-01-01 PROCEDURE — 99999 PR PBB SHADOW E&M-EST. PATIENT-LVL IV: ICD-10-PCS | Mod: PBBFAC,,, | Performed by: INTERNAL MEDICINE

## 2020-01-01 PROCEDURE — 85025 COMPLETE CBC W/AUTO DIFF WBC: CPT

## 2020-01-01 PROCEDURE — 82803 BLOOD GASES ANY COMBINATION: CPT

## 2020-01-01 PROCEDURE — 25000003 PHARM REV CODE 250: Performed by: PHYSICIAN ASSISTANT

## 2020-01-01 PROCEDURE — 99291 CRITICAL CARE FIRST HOUR: CPT | Mod: ,,, | Performed by: NURSE PRACTITIONER

## 2020-01-01 PROCEDURE — 99223 PR INITIAL HOSPITAL CARE,LEVL III: ICD-10-PCS | Mod: ,,, | Performed by: INTERNAL MEDICINE

## 2020-01-01 PROCEDURE — 93005 ELECTROCARDIOGRAM TRACING: CPT

## 2020-01-01 PROCEDURE — 84100 ASSAY OF PHOSPHORUS: CPT

## 2020-01-01 PROCEDURE — 63600175 PHARM REV CODE 636 W HCPCS: Performed by: INTERNAL MEDICINE

## 2020-01-01 PROCEDURE — 93010 ELECTROCARDIOGRAM REPORT: CPT | Mod: ,,, | Performed by: INTERNAL MEDICINE

## 2020-01-01 PROCEDURE — 99214 PR OFFICE/OUTPT VISIT, EST, LEVL IV, 30-39 MIN: ICD-10-PCS | Mod: 25,S$GLB,, | Performed by: PHYSICIAN ASSISTANT

## 2020-01-01 PROCEDURE — 96361 HYDRATE IV INFUSION ADD-ON: CPT

## 2020-01-01 PROCEDURE — 99285 PR EMERGENCY DEPT VISIT,LEVEL V: ICD-10-PCS | Mod: ,,, | Performed by: PHYSICIAN ASSISTANT

## 2020-01-01 PROCEDURE — 93010 EKG 12-LEAD: ICD-10-PCS | Mod: ,,, | Performed by: INTERNAL MEDICINE

## 2020-01-01 PROCEDURE — 94761 N-INVAS EAR/PLS OXIMETRY MLT: CPT

## 2020-01-01 PROCEDURE — 25000003 PHARM REV CODE 250: Performed by: INTERNAL MEDICINE

## 2020-01-01 PROCEDURE — 83735 ASSAY OF MAGNESIUM: CPT

## 2020-01-01 PROCEDURE — 96374 THER/PROPH/DIAG INJ IV PUSH: CPT

## 2020-01-01 PROCEDURE — 99233 SBSQ HOSP IP/OBS HIGH 50: CPT | Mod: ,,, | Performed by: EMERGENCY MEDICINE

## 2020-01-01 PROCEDURE — 25000003 PHARM REV CODE 250: Performed by: STUDENT IN AN ORGANIZED HEALTH CARE EDUCATION/TRAINING PROGRAM

## 2020-01-01 PROCEDURE — 63600175 PHARM REV CODE 636 W HCPCS

## 2020-01-01 PROCEDURE — 36415 COLL VENOUS BLD VENIPUNCTURE: CPT

## 2020-01-01 PROCEDURE — 82330 ASSAY OF CALCIUM: CPT

## 2020-01-01 PROCEDURE — 63600175 PHARM REV CODE 636 W HCPCS: Performed by: NURSE PRACTITIONER

## 2020-01-01 PROCEDURE — 1159F PR MEDICATION LIST DOCUMENTED IN MEDICAL RECORD: ICD-10-PCS | Mod: ,,, | Performed by: INTERNAL MEDICINE

## 2020-01-01 PROCEDURE — 82800 BLOOD PH: CPT

## 2020-01-01 PROCEDURE — 63600175 PHARM REV CODE 636 W HCPCS: Performed by: STUDENT IN AN ORGANIZED HEALTH CARE EDUCATION/TRAINING PROGRAM

## 2020-01-01 PROCEDURE — 27100171 HC OXYGEN HIGH FLOW UP TO 24 HOURS

## 2020-01-01 PROCEDURE — 99214 OFFICE O/P EST MOD 30 MIN: CPT | Mod: S$PBB,,, | Performed by: INTERNAL MEDICINE

## 2020-01-01 PROCEDURE — 27100092 HC HIGH FLOW DELIVERY CANNULA

## 2020-01-01 PROCEDURE — 83605 ASSAY OF LACTIC ACID: CPT

## 2020-01-01 PROCEDURE — 36600 WITHDRAWAL OF ARTERIAL BLOOD: CPT

## 2020-01-01 PROCEDURE — 80061 LIPID PANEL: CPT

## 2020-01-01 PROCEDURE — 99285 EMERGENCY DEPT VISIT HI MDM: CPT | Mod: ,,, | Performed by: PHYSICIAN ASSISTANT

## 2020-01-01 PROCEDURE — 87040 BLOOD CULTURE FOR BACTERIA: CPT

## 2020-01-01 PROCEDURE — 80202 ASSAY OF VANCOMYCIN: CPT

## 2020-01-01 PROCEDURE — 99291 CRITICAL CARE FIRST HOUR: CPT | Mod: GC,,, | Performed by: INTERNAL MEDICINE

## 2020-01-01 PROCEDURE — 82274 ASSAY TEST FOR BLOOD FECAL: CPT

## 2020-01-01 PROCEDURE — 99214 OFFICE O/P EST MOD 30 MIN: CPT | Mod: PBBFAC,27,25 | Performed by: INTERNAL MEDICINE

## 2020-01-01 PROCEDURE — 99223 1ST HOSP IP/OBS HIGH 75: CPT | Mod: ,,, | Performed by: INTERNAL MEDICINE

## 2020-01-01 PROCEDURE — 27000221 HC OXYGEN, UP TO 24 HOURS

## 2020-01-01 PROCEDURE — 99999 PR PBB SHADOW E&M-EST. PATIENT-LVL I: ICD-10-PCS | Mod: PBBFAC,,,

## 2020-01-01 PROCEDURE — 25000242 PHARM REV CODE 250 ALT 637 W/ HCPCS: Performed by: STUDENT IN AN ORGANIZED HEALTH CARE EDUCATION/TRAINING PROGRAM

## 2020-01-01 PROCEDURE — 11000001 HC ACUTE MED/SURG PRIVATE ROOM

## 2020-01-01 PROCEDURE — 63600175 PHARM REV CODE 636 W HCPCS: Performed by: PHYSICIAN ASSISTANT

## 2020-01-01 PROCEDURE — 99999 PR PBB SHADOW E&M-EST. PATIENT-LVL IV: CPT | Mod: PBBFAC,,, | Performed by: INTERNAL MEDICINE

## 2020-01-01 PROCEDURE — 1159F MED LIST DOCD IN RCRD: CPT | Mod: ,,, | Performed by: INTERNAL MEDICINE

## 2020-01-01 PROCEDURE — 81001 URINALYSIS AUTO W/SCOPE: CPT

## 2020-01-01 PROCEDURE — 93010 ELECTROCARDIOGRAM REPORT: CPT | Mod: S$PBB,,, | Performed by: INTERNAL MEDICINE

## 2020-01-01 PROCEDURE — 71046 XR CHEST PA AND LATERAL: ICD-10-PCS | Mod: S$GLB,,, | Performed by: RADIOLOGY

## 2020-01-01 PROCEDURE — 1126F AMNT PAIN NOTED NONE PRSNT: CPT | Mod: ,,, | Performed by: INTERNAL MEDICINE

## 2020-01-01 PROCEDURE — 85014 HEMATOCRIT: CPT

## 2020-01-01 PROCEDURE — 87804 POCT INFLUENZA A/B: ICD-10-PCS | Mod: 59,QW,S$GLB, | Performed by: PHYSICIAN ASSISTANT

## 2020-01-01 PROCEDURE — 87449 NOS EACH ORGANISM AG IA: CPT

## 2020-01-01 PROCEDURE — 99999 PR PBB SHADOW E&M-EST. PATIENT-LVL III: ICD-10-PCS | Mod: PBBFAC,,, | Performed by: INTERNAL MEDICINE

## 2020-01-01 PROCEDURE — 99223 1ST HOSP IP/OBS HIGH 75: CPT | Mod: ,,, | Performed by: EMERGENCY MEDICINE

## 2020-01-01 PROCEDURE — 87040 BLOOD CULTURE FOR BACTERIA: CPT | Mod: 59

## 2020-01-01 PROCEDURE — G0009 ADMIN PNEUMOCOCCAL VACCINE: HCPCS | Mod: PBBFAC

## 2020-01-01 PROCEDURE — 1126F PR PAIN SEVERITY QUANTIFIED, NO PAIN PRESENT: ICD-10-PCS | Mod: ,,, | Performed by: INTERNAL MEDICINE

## 2020-01-01 PROCEDURE — 25000003 PHARM REV CODE 250: Performed by: NURSE PRACTITIONER

## 2020-01-01 PROCEDURE — 99232 PR SUBSEQUENT HOSPITAL CARE,LEVL II: ICD-10-PCS | Mod: ,,, | Performed by: INTERNAL MEDICINE

## 2020-01-01 PROCEDURE — 99223 PR INITIAL HOSPITAL CARE,LEVL III: ICD-10-PCS | Mod: ,,, | Performed by: EMERGENCY MEDICINE

## 2020-01-01 PROCEDURE — 87070 CULTURE OTHR SPECIMN AEROBIC: CPT

## 2020-01-01 PROCEDURE — 84132 ASSAY OF SERUM POTASSIUM: CPT

## 2020-01-01 PROCEDURE — 99214 OFFICE O/P EST MOD 30 MIN: CPT | Mod: 25,S$GLB,, | Performed by: PHYSICIAN ASSISTANT

## 2020-01-01 PROCEDURE — 99285 EMERGENCY DEPT VISIT HI MDM: CPT | Mod: 25

## 2020-01-01 PROCEDURE — 99214 PR OFFICE/OUTPT VISIT, EST, LEVL IV, 30-39 MIN: ICD-10-PCS | Mod: S$PBB,,, | Performed by: INTERNAL MEDICINE

## 2020-01-01 PROCEDURE — 87205 SMEAR GRAM STAIN: CPT

## 2020-01-01 PROCEDURE — 99291 PR CRITICAL CARE, E/M 30-74 MINUTES: ICD-10-PCS | Mod: ,,, | Performed by: NURSE PRACTITIONER

## 2020-01-01 PROCEDURE — 84295 ASSAY OF SERUM SODIUM: CPT

## 2020-01-01 PROCEDURE — 99213 OFFICE O/P EST LOW 20 MIN: CPT | Mod: PBBFAC | Performed by: INTERNAL MEDICINE

## 2020-01-01 PROCEDURE — 99291 PR CRITICAL CARE, E/M 30-74 MINUTES: ICD-10-PCS | Mod: GC,,, | Performed by: INTERNAL MEDICINE

## 2020-01-01 PROCEDURE — 83036 HEMOGLOBIN GLYCOSYLATED A1C: CPT

## 2020-01-01 PROCEDURE — 87324 CLOSTRIDIUM AG IA: CPT

## 2020-01-01 PROCEDURE — 93010 RHYTHM STRIP: ICD-10-PCS | Mod: S$PBB,,, | Performed by: INTERNAL MEDICINE

## 2020-01-01 PROCEDURE — 71046 X-RAY EXAM CHEST 2 VIEWS: CPT | Mod: S$GLB,,, | Performed by: RADIOLOGY

## 2020-01-01 PROCEDURE — 87804 INFLUENZA ASSAY W/OPTIC: CPT | Mod: QW,S$GLB,, | Performed by: PHYSICIAN ASSISTANT

## 2020-01-01 PROCEDURE — 99999 PR PBB SHADOW E&M-EST. PATIENT-LVL III: CPT | Mod: PBBFAC,,, | Performed by: INTERNAL MEDICINE

## 2020-01-01 RX ORDER — DEXMEDETOMIDINE HYDROCHLORIDE 4 UG/ML
0.4 INJECTION, SOLUTION INTRAVENOUS CONTINUOUS
Status: DISCONTINUED | OUTPATIENT
Start: 2020-01-01 | End: 2020-01-01 | Stop reason: HOSPADM

## 2020-01-01 RX ORDER — MONTELUKAST SODIUM 10 MG/1
TABLET ORAL
COMMUNITY

## 2020-01-01 RX ORDER — HYDROXYCHLOROQUINE SULFATE 200 MG/1
200 TABLET, FILM COATED ORAL 2 TIMES DAILY
Status: DISCONTINUED | OUTPATIENT
Start: 2020-01-01 | End: 2020-01-01

## 2020-01-01 RX ORDER — GLUCOSAMINE/CHONDR SU A SOD 750-600 MG
TABLET ORAL
COMMUNITY
Start: 2019-01-01

## 2020-01-01 RX ORDER — TAMSULOSIN HYDROCHLORIDE 0.4 MG/1
0.4 CAPSULE ORAL 2 TIMES DAILY
Qty: 180 CAPSULE | Refills: 3 | Status: SHIPPED | OUTPATIENT
Start: 2020-01-01

## 2020-01-01 RX ORDER — ACETAMINOPHEN 325 MG/1
650 TABLET ORAL EVERY 4 HOURS PRN
Status: DISCONTINUED | OUTPATIENT
Start: 2020-01-01 | End: 2020-01-01 | Stop reason: HOSPADM

## 2020-01-01 RX ORDER — DEXMEDETOMIDINE HYDROCHLORIDE 4 UG/ML
0.4 INJECTION, SOLUTION INTRAVENOUS CONTINUOUS
Status: DISCONTINUED | OUTPATIENT
Start: 2020-01-01 | End: 2020-01-01

## 2020-01-01 RX ORDER — LEVOTHYROXINE SODIUM 75 UG/1
TABLET ORAL
COMMUNITY

## 2020-01-01 RX ORDER — PROMETHAZINE HYDROCHLORIDE 25 MG/1
25 TABLET ORAL EVERY 6 HOURS PRN
Status: DISCONTINUED | OUTPATIENT
Start: 2020-01-01 | End: 2020-01-01 | Stop reason: HOSPADM

## 2020-01-01 RX ORDER — OXYBUTYNIN CHLORIDE 10 MG/1
TABLET, EXTENDED RELEASE ORAL
COMMUNITY

## 2020-01-01 RX ORDER — FUROSEMIDE 10 MG/ML
40 INJECTION INTRAMUSCULAR; INTRAVENOUS ONCE
Status: COMPLETED | OUTPATIENT
Start: 2020-01-01 | End: 2020-01-01

## 2020-01-01 RX ORDER — CEFTRIAXONE 1 G/1
1 INJECTION, POWDER, FOR SOLUTION INTRAMUSCULAR; INTRAVENOUS
Status: COMPLETED | OUTPATIENT
Start: 2020-01-01 | End: 2020-01-01

## 2020-01-01 RX ORDER — METOPROLOL TARTRATE 25 MG/1
12.5 TABLET ORAL 2 TIMES DAILY
Status: DISCONTINUED | OUTPATIENT
Start: 2020-01-01 | End: 2020-01-01

## 2020-01-01 RX ORDER — PANTOPRAZOLE SODIUM 40 MG/1
40 TABLET, DELAYED RELEASE ORAL DAILY
Status: DISCONTINUED | OUTPATIENT
Start: 2020-01-01 | End: 2020-01-01

## 2020-01-01 RX ORDER — SPIRONOLACTONE 25 MG/1
25 TABLET ORAL DAILY
Status: DISCONTINUED | OUTPATIENT
Start: 2020-01-01 | End: 2020-01-01

## 2020-01-01 RX ORDER — DOXAZOSIN 8 MG/1
TABLET ORAL
COMMUNITY

## 2020-01-01 RX ORDER — HYDROXYCHLOROQUINE SULFATE 200 MG/1
400 TABLET, FILM COATED ORAL 2 TIMES DAILY
Status: COMPLETED | OUTPATIENT
Start: 2020-01-01 | End: 2020-01-01

## 2020-01-01 RX ORDER — LORAZEPAM 2 MG/ML
1 INJECTION INTRAMUSCULAR
Status: DISCONTINUED | OUTPATIENT
Start: 2020-01-01 | End: 2020-01-01 | Stop reason: HOSPADM

## 2020-01-01 RX ORDER — LORAZEPAM 1 MG/1
1 TABLET ORAL EVERY 30 MIN PRN
Status: DISCONTINUED | OUTPATIENT
Start: 2020-01-01 | End: 2020-01-01

## 2020-01-01 RX ORDER — MIRTAZAPINE 15 MG/1
15 TABLET, FILM COATED ORAL NIGHTLY
Status: DISCONTINUED | OUTPATIENT
Start: 2020-01-01 | End: 2020-01-01

## 2020-01-01 RX ORDER — MORPHINE SULFATE 1 MG/ML
1 INJECTION, SOLUTION INTRAVENOUS CONTINUOUS
Status: DISCONTINUED | OUTPATIENT
Start: 2020-01-01 | End: 2020-01-01 | Stop reason: HOSPADM

## 2020-01-01 RX ORDER — LORAZEPAM 2 MG/ML
2 INJECTION INTRAMUSCULAR ONCE
Status: COMPLETED | OUTPATIENT
Start: 2020-01-01 | End: 2020-01-01

## 2020-01-01 RX ORDER — ACETAMINOPHEN 325 MG/1
650 TABLET ORAL
Status: COMPLETED | OUTPATIENT
Start: 2020-01-01 | End: 2020-01-01

## 2020-01-01 RX ORDER — TAMSULOSIN HYDROCHLORIDE 0.4 MG/1
0.4 CAPSULE ORAL DAILY
Status: DISCONTINUED | OUTPATIENT
Start: 2020-01-01 | End: 2020-01-01

## 2020-01-01 RX ORDER — LEVOTHYROXINE SODIUM 75 UG/1
75 TABLET ORAL
Status: DISCONTINUED | OUTPATIENT
Start: 2020-01-01 | End: 2020-01-01

## 2020-01-01 RX ORDER — SODIUM CHLORIDE 0.9 % (FLUSH) 0.9 %
10 SYRINGE (ML) INJECTION
Status: DISCONTINUED | OUTPATIENT
Start: 2020-01-01 | End: 2020-01-01 | Stop reason: HOSPADM

## 2020-01-01 RX ORDER — INSULIN ASPART 100 [IU]/ML
0-5 INJECTION, SOLUTION INTRAVENOUS; SUBCUTANEOUS
Status: CANCELLED | OUTPATIENT
Start: 2020-01-01

## 2020-01-01 RX ORDER — POTASSIUM CHLORIDE 7.45 MG/ML
10 INJECTION INTRAVENOUS
Status: COMPLETED | OUTPATIENT
Start: 2020-01-01 | End: 2020-01-01

## 2020-01-01 RX ORDER — LOPERAMIDE HYDROCHLORIDE 2 MG/1
2 CAPSULE ORAL
Status: DISCONTINUED | OUTPATIENT
Start: 2020-01-01 | End: 2020-01-01

## 2020-01-01 RX ORDER — IBUPROFEN 200 MG
16 TABLET ORAL
Status: DISCONTINUED | OUTPATIENT
Start: 2020-01-01 | End: 2020-01-01 | Stop reason: HOSPADM

## 2020-01-01 RX ORDER — FINASTERIDE 5 MG/1
5 TABLET, FILM COATED ORAL DAILY
Status: DISCONTINUED | OUTPATIENT
Start: 2020-01-01 | End: 2020-01-01

## 2020-01-01 RX ORDER — ONDANSETRON 2 MG/ML
4 INJECTION INTRAMUSCULAR; INTRAVENOUS EVERY 6 HOURS PRN
Status: DISCONTINUED | OUTPATIENT
Start: 2020-01-01 | End: 2020-01-01 | Stop reason: HOSPADM

## 2020-01-01 RX ORDER — ONDANSETRON 8 MG/1
8 TABLET, ORALLY DISINTEGRATING ORAL EVERY 8 HOURS PRN
Status: DISCONTINUED | OUTPATIENT
Start: 2020-01-01 | End: 2020-01-01 | Stop reason: HOSPADM

## 2020-01-01 RX ORDER — MORPHINE SULFATE 15 MG/1
15 TABLET, FILM COATED, EXTENDED RELEASE ORAL EVERY 12 HOURS
Status: DISCONTINUED | OUTPATIENT
Start: 2020-01-01 | End: 2020-01-01 | Stop reason: HOSPADM

## 2020-01-01 RX ORDER — FLUTICASONE PROPIONATE 50 MCG
2 SPRAY, SUSPENSION (ML) NASAL DAILY
Status: DISCONTINUED | OUTPATIENT
Start: 2020-01-01 | End: 2020-01-01

## 2020-01-01 RX ORDER — CEFTRIAXONE 1 G/1
1 INJECTION, POWDER, FOR SOLUTION INTRAMUSCULAR; INTRAVENOUS
Status: DISCONTINUED | OUTPATIENT
Start: 2020-01-01 | End: 2020-01-01

## 2020-01-01 RX ORDER — VANCOMYCIN 2 GRAM/500 ML IN 0.9 % SODIUM CHLORIDE INTRAVENOUS
2000 ONCE
Status: COMPLETED | OUTPATIENT
Start: 2020-01-01 | End: 2020-01-01

## 2020-01-01 RX ORDER — HALOPERIDOL 5 MG/ML
5 INJECTION INTRAMUSCULAR ONCE
Status: COMPLETED | OUTPATIENT
Start: 2020-01-01 | End: 2020-01-01

## 2020-01-01 RX ORDER — PROPOFOL 10 MG/ML
INJECTION, EMULSION INTRAVENOUS
Status: DISCONTINUED
Start: 2020-01-01 | End: 2020-01-01 | Stop reason: HOSPADM

## 2020-01-01 RX ORDER — MORPHINE SULFATE 2 MG/ML
1 INJECTION, SOLUTION INTRAMUSCULAR; INTRAVENOUS EVERY 4 HOURS PRN
Status: DISCONTINUED | OUTPATIENT
Start: 2020-01-01 | End: 2020-01-01 | Stop reason: HOSPADM

## 2020-01-01 RX ORDER — MORPHINE SULFATE/0.9% NACL/PF 1 MG/ML
0.5 PLASTIC BAG, INJECTION (ML) INTRAVENOUS CONTINUOUS
Status: DISCONTINUED | OUTPATIENT
Start: 2020-01-01 | End: 2020-01-01

## 2020-01-01 RX ORDER — IBUPROFEN 200 MG
24 TABLET ORAL
Status: DISCONTINUED | OUTPATIENT
Start: 2020-01-01 | End: 2020-01-01 | Stop reason: HOSPADM

## 2020-01-01 RX ORDER — MIRTAZAPINE 15 MG/1
15 TABLET, FILM COATED ORAL NIGHTLY
Qty: 90 TABLET | Refills: 1 | Status: SHIPPED | OUTPATIENT
Start: 2020-01-01

## 2020-01-01 RX ORDER — PROMETHAZINE HYDROCHLORIDE AND DEXTROMETHORPHAN HYDROBROMIDE 6.25; 15 MG/5ML; MG/5ML
5 SYRUP ORAL NIGHTLY PRN
Qty: 118 ML | Refills: 0 | Status: SHIPPED | OUTPATIENT
Start: 2020-01-01 | End: 2020-01-01

## 2020-01-01 RX ORDER — ATORVASTATIN CALCIUM 20 MG/1
40 TABLET, FILM COATED ORAL DAILY
Status: DISCONTINUED | OUTPATIENT
Start: 2020-01-01 | End: 2020-01-01

## 2020-01-01 RX ORDER — DOXAZOSIN 2 MG/1
8 TABLET ORAL DAILY
Status: DISCONTINUED | OUTPATIENT
Start: 2020-01-01 | End: 2020-01-01

## 2020-01-01 RX ORDER — TALC
6 POWDER (GRAM) TOPICAL NIGHTLY PRN
Status: DISCONTINUED | OUTPATIENT
Start: 2020-01-01 | End: 2020-01-01 | Stop reason: HOSPADM

## 2020-01-01 RX ORDER — CEFEPIME HYDROCHLORIDE 2 G/1
2 INJECTION, POWDER, FOR SOLUTION INTRAVENOUS
Status: DISCONTINUED | OUTPATIENT
Start: 2020-01-01 | End: 2020-01-01

## 2020-01-01 RX ORDER — AZITHROMYCIN 250 MG/1
250 TABLET, FILM COATED ORAL DAILY
Status: COMPLETED | OUTPATIENT
Start: 2020-01-01 | End: 2020-01-01

## 2020-01-01 RX ORDER — SPIRONOLACTONE 25 MG/1
TABLET ORAL
COMMUNITY

## 2020-01-01 RX ORDER — GLUCAGON 1 MG
1 KIT INJECTION
Status: DISCONTINUED | OUTPATIENT
Start: 2020-01-01 | End: 2020-01-01 | Stop reason: HOSPADM

## 2020-01-01 RX ORDER — FLECAINIDE ACETATE 50 MG/1
50 TABLET ORAL EVERY 12 HOURS
Status: DISCONTINUED | OUTPATIENT
Start: 2020-01-01 | End: 2020-01-01

## 2020-01-01 RX ORDER — DOXYCYCLINE 100 MG/1
100 CAPSULE ORAL 2 TIMES DAILY
Qty: 14 CAPSULE | Refills: 0 | Status: SHIPPED | OUTPATIENT
Start: 2020-01-01 | End: 2020-01-01

## 2020-01-01 RX ADMIN — ACETAMINOPHEN 650 MG: 325 TABLET ORAL at 04:03

## 2020-01-01 RX ADMIN — METOPROLOL TARTRATE 12.5 MG: 25 TABLET, FILM COATED ORAL at 09:03

## 2020-01-01 RX ADMIN — MIRTAZAPINE 15 MG: 15 TABLET, FILM COATED ORAL at 10:03

## 2020-01-01 RX ADMIN — FLECAINIDE ACETATE 50 MG: 50 TABLET ORAL at 11:03

## 2020-01-01 RX ADMIN — LEVOTHYROXINE SODIUM 75 MCG: 75 TABLET ORAL at 06:03

## 2020-01-01 RX ADMIN — METOPROLOL TARTRATE 12.5 MG: 25 TABLET, FILM COATED ORAL at 12:03

## 2020-01-01 RX ADMIN — AZITHROMYCIN 250 MG: 250 TABLET, FILM COATED ORAL at 10:03

## 2020-01-01 RX ADMIN — ATORVASTATIN CALCIUM 40 MG: 20 TABLET, FILM COATED ORAL at 10:03

## 2020-01-01 RX ADMIN — ACETAMINOPHEN 650 MG: 325 TABLET ORAL at 12:03

## 2020-01-01 RX ADMIN — SPIRONOLACTONE 25 MG: 25 TABLET ORAL at 10:03

## 2020-01-01 RX ADMIN — PIPERACILLIN AND TAZOBACTAM 4.5 G: 4; .5 INJECTION, POWDER, LYOPHILIZED, FOR SOLUTION INTRAVENOUS; PARENTERAL at 08:03

## 2020-01-01 RX ADMIN — AZITHROMYCIN 250 MG: 250 TABLET, FILM COATED ORAL at 08:03

## 2020-01-01 RX ADMIN — TAMSULOSIN HYDROCHLORIDE 0.4 MG: 0.4 CAPSULE ORAL at 11:03

## 2020-01-01 RX ADMIN — MORPHINE SULFATE 1 MG: 2 INJECTION, SOLUTION INTRAMUSCULAR; INTRAVENOUS at 06:03

## 2020-01-01 RX ADMIN — METHYLPREDNISOLONE SODIUM SUCCINATE 40 MG: 40 INJECTION, POWDER, FOR SOLUTION INTRAMUSCULAR; INTRAVENOUS at 10:03

## 2020-01-01 RX ADMIN — FLUTICASONE PROPIONATE 100 MCG: 50 SPRAY, METERED NASAL at 10:03

## 2020-01-01 RX ADMIN — METHYLPREDNISOLONE SODIUM SUCCINATE 40 MG: 40 INJECTION, POWDER, FOR SOLUTION INTRAMUSCULAR; INTRAVENOUS at 09:03

## 2020-01-01 RX ADMIN — PANTOPRAZOLE SODIUM 40 MG: 40 TABLET, DELAYED RELEASE ORAL at 08:03

## 2020-01-01 RX ADMIN — POTASSIUM CHLORIDE 10 MEQ: 7.46 INJECTION, SOLUTION INTRAVENOUS at 02:03

## 2020-01-01 RX ADMIN — FINASTERIDE 5 MG: 5 TABLET, FILM COATED ORAL at 08:03

## 2020-01-01 RX ADMIN — FLECAINIDE ACETATE 50 MG: 50 TABLET ORAL at 09:03

## 2020-01-01 RX ADMIN — AZITHROMYCIN 250 MG: 250 TABLET, FILM COATED ORAL at 11:03

## 2020-01-01 RX ADMIN — SODIUM CHLORIDE 1000 ML: 0.9 INJECTION, SOLUTION INTRAVENOUS at 04:03

## 2020-01-01 RX ADMIN — FLECAINIDE ACETATE 50 MG: 50 TABLET ORAL at 08:03

## 2020-01-01 RX ADMIN — APIXABAN 5 MG: 5 TABLET, FILM COATED ORAL at 10:03

## 2020-01-01 RX ADMIN — CEFTRIAXONE SODIUM 1 G: 1 INJECTION, POWDER, FOR SOLUTION INTRAMUSCULAR; INTRAVENOUS at 04:03

## 2020-01-01 RX ADMIN — FLUTICASONE PROPIONATE 100 MCG: 50 SPRAY, METERED NASAL at 07:03

## 2020-01-01 RX ADMIN — SPIRONOLACTONE 25 MG: 25 TABLET ORAL at 12:03

## 2020-01-01 RX ADMIN — CEFTRIAXONE SODIUM 1 G: 1 INJECTION, POWDER, FOR SOLUTION INTRAMUSCULAR; INTRAVENOUS at 09:03

## 2020-01-01 RX ADMIN — APIXABAN 5 MG: 5 TABLET, FILM COATED ORAL at 11:03

## 2020-01-01 RX ADMIN — METOPROLOL TARTRATE 12.5 MG: 25 TABLET, FILM COATED ORAL at 11:03

## 2020-01-01 RX ADMIN — DEXMEDETOMIDINE HYDROCHLORIDE 0.4 MCG/KG/HR: 100 INJECTION, SOLUTION, CONCENTRATE INTRAVENOUS at 10:03

## 2020-01-01 RX ADMIN — POTASSIUM CHLORIDE 10 MEQ: 7.46 INJECTION, SOLUTION INTRAVENOUS at 06:03

## 2020-01-01 RX ADMIN — HYDROXYCHLOROQUINE SULFATE 400 MG: 200 TABLET, FILM COATED ORAL at 01:03

## 2020-01-01 RX ADMIN — FUROSEMIDE 40 MG: 10 INJECTION, SOLUTION INTRAMUSCULAR; INTRAVENOUS at 04:03

## 2020-01-01 RX ADMIN — VANCOMYCIN HYDROCHLORIDE 1250 MG: 1.25 INJECTION, POWDER, LYOPHILIZED, FOR SOLUTION INTRAVENOUS at 08:03

## 2020-01-01 RX ADMIN — ACETAMINOPHEN 650 MG: 325 TABLET ORAL at 08:03

## 2020-01-01 RX ADMIN — MORPHINE SULFATE 1 MG: 2 INJECTION, SOLUTION INTRAMUSCULAR; INTRAVENOUS at 02:03

## 2020-01-01 RX ADMIN — PIPERACILLIN AND TAZOBACTAM 4.5 G: 4; .5 INJECTION, POWDER, LYOPHILIZED, FOR SOLUTION INTRAVENOUS; PARENTERAL at 02:03

## 2020-01-01 RX ADMIN — MIRTAZAPINE 15 MG: 15 TABLET, FILM COATED ORAL at 11:03

## 2020-01-01 RX ADMIN — MIRTAZAPINE 15 MG: 15 TABLET, FILM COATED ORAL at 09:03

## 2020-01-01 RX ADMIN — TAMSULOSIN HYDROCHLORIDE 0.4 MG: 0.4 CAPSULE ORAL at 12:03

## 2020-01-01 RX ADMIN — FINASTERIDE 5 MG: 5 TABLET, FILM COATED ORAL at 11:03

## 2020-01-01 RX ADMIN — FLECAINIDE ACETATE 50 MG: 50 TABLET ORAL at 10:03

## 2020-01-01 RX ADMIN — PIPERACILLIN AND TAZOBACTAM 4.5 G: 4; .5 INJECTION, POWDER, LYOPHILIZED, FOR SOLUTION INTRAVENOUS; PARENTERAL at 04:03

## 2020-01-01 RX ADMIN — FUROSEMIDE 40 MG: 10 INJECTION, SOLUTION INTRAMUSCULAR; INTRAVENOUS at 09:03

## 2020-01-01 RX ADMIN — PANTOPRAZOLE SODIUM 40 MG: 40 TABLET, DELAYED RELEASE ORAL at 10:03

## 2020-01-01 RX ADMIN — LEVOTHYROXINE SODIUM 75 MCG: 75 TABLET ORAL at 05:03

## 2020-01-01 RX ADMIN — FINASTERIDE 5 MG: 5 TABLET, FILM COATED ORAL at 10:03

## 2020-01-01 RX ADMIN — HYDROXYCHLOROQUINE SULFATE 400 MG: 200 TABLET, FILM COATED ORAL at 08:03

## 2020-01-01 RX ADMIN — PIPERACILLIN AND TAZOBACTAM 4.5 G: 4; .5 INJECTION, POWDER, LYOPHILIZED, FOR SOLUTION INTRAVENOUS; PARENTERAL at 06:03

## 2020-01-01 RX ADMIN — AZITHROMYCIN 250 MG: 250 TABLET, FILM COATED ORAL at 12:03

## 2020-01-01 RX ADMIN — TAMSULOSIN HYDROCHLORIDE 0.4 MG: 0.4 CAPSULE ORAL at 10:03

## 2020-01-01 RX ADMIN — SPIRONOLACTONE 25 MG: 25 TABLET ORAL at 11:03

## 2020-01-01 RX ADMIN — FUROSEMIDE 40 MG: 10 INJECTION, SOLUTION INTRAMUSCULAR; INTRAVENOUS at 08:03

## 2020-01-01 RX ADMIN — VANCOMYCIN HYDROCHLORIDE 1250 MG: 1.25 INJECTION, POWDER, LYOPHILIZED, FOR SOLUTION INTRAVENOUS at 10:03

## 2020-01-01 RX ADMIN — ATORVASTATIN CALCIUM 40 MG: 20 TABLET, FILM COATED ORAL at 12:03

## 2020-01-01 RX ADMIN — PANTOPRAZOLE SODIUM 40 MG: 40 TABLET, DELAYED RELEASE ORAL at 11:03

## 2020-01-01 RX ADMIN — VANCOMYCIN HYDROCHLORIDE 1250 MG: 1.25 INJECTION, POWDER, LYOPHILIZED, FOR SOLUTION INTRAVENOUS at 09:03

## 2020-01-01 RX ADMIN — POTASSIUM CHLORIDE 10 MEQ: 7.46 INJECTION, SOLUTION INTRAVENOUS at 11:03

## 2020-01-01 RX ADMIN — APIXABAN 5 MG: 5 TABLET, FILM COATED ORAL at 09:03

## 2020-01-01 RX ADMIN — Medication 6 MG: at 10:03

## 2020-01-01 RX ADMIN — LORAZEPAM 2 MG: 2 INJECTION INTRAMUSCULAR; INTRAVENOUS at 09:03

## 2020-01-01 RX ADMIN — DEXMEDETOMIDINE HYDROCHLORIDE 0.4 MCG/KG/HR: 100 INJECTION, SOLUTION, CONCENTRATE INTRAVENOUS at 04:03

## 2020-01-01 RX ADMIN — ACETAMINOPHEN 650 MG: 325 TABLET ORAL at 11:03

## 2020-01-01 RX ADMIN — Medication 6 MG: at 08:03

## 2020-01-01 RX ADMIN — DEXMEDETOMIDINE HYDROCHLORIDE 1.4 MCG/KG/HR: 100 INJECTION, SOLUTION, CONCENTRATE INTRAVENOUS at 12:03

## 2020-01-01 RX ADMIN — ATORVASTATIN CALCIUM 40 MG: 20 TABLET, FILM COATED ORAL at 08:03

## 2020-01-01 RX ADMIN — MIRTAZAPINE 15 MG: 15 TABLET, FILM COATED ORAL at 08:03

## 2020-01-01 RX ADMIN — MORPHINE SULFATE 15 MG: 15 TABLET, EXTENDED RELEASE ORAL at 11:03

## 2020-01-01 RX ADMIN — APIXABAN 5 MG: 5 TABLET, FILM COATED ORAL at 08:03

## 2020-01-01 RX ADMIN — MORPHINE SULFATE 1 MG/HR: 1 INJECTION, SOLUTION INTRAVENOUS at 09:03

## 2020-01-01 RX ADMIN — VANCOMYCIN HYDROCHLORIDE 2000 MG: 100 INJECTION, POWDER, LYOPHILIZED, FOR SOLUTION INTRAVENOUS at 10:03

## 2020-01-01 RX ADMIN — METOPROLOL TARTRATE 12.5 MG: 25 TABLET, FILM COATED ORAL at 10:03

## 2020-01-01 RX ADMIN — POTASSIUM CHLORIDE 10 MEQ: 7.46 INJECTION, SOLUTION INTRAVENOUS at 08:03

## 2020-01-01 RX ADMIN — AZITHROMYCIN MONOHYDRATE 500 MG: 500 INJECTION, POWDER, LYOPHILIZED, FOR SOLUTION INTRAVENOUS at 06:03

## 2020-01-01 RX ADMIN — PIPERACILLIN AND TAZOBACTAM 4.5 G: 4; .5 INJECTION, POWDER, LYOPHILIZED, FOR SOLUTION INTRAVENOUS; PARENTERAL at 11:03

## 2020-01-01 RX ADMIN — POTASSIUM CHLORIDE 10 MEQ: 7.46 INJECTION, SOLUTION INTRAVENOUS at 10:03

## 2020-01-01 RX ADMIN — HYDROXYCHLOROQUINE SULFATE 200 MG: 200 TABLET, FILM COATED ORAL at 10:03

## 2020-01-01 RX ADMIN — TAMSULOSIN HYDROCHLORIDE 0.4 MG: 0.4 CAPSULE ORAL at 08:03

## 2020-01-01 RX ADMIN — HALOPERIDOL LACTATE 5 MG: 5 INJECTION, SOLUTION INTRAMUSCULAR at 10:03

## 2020-01-01 RX ADMIN — METOPROLOL TARTRATE 12.5 MG: 25 TABLET, FILM COATED ORAL at 08:03

## 2020-01-01 RX ADMIN — ACETAMINOPHEN 650 MG: 325 TABLET ORAL at 10:03

## 2020-01-01 RX ADMIN — METHYLPREDNISOLONE SODIUM SUCCINATE 40 MG: 40 INJECTION, POWDER, FOR SOLUTION INTRAMUSCULAR; INTRAVENOUS at 08:03

## 2020-01-01 RX ADMIN — SPIRONOLACTONE 25 MG: 25 TABLET ORAL at 08:03

## 2020-01-01 RX ADMIN — ATORVASTATIN CALCIUM 40 MG: 20 TABLET, FILM COATED ORAL at 11:03

## 2020-01-01 RX ADMIN — METHYLPREDNISOLONE SODIUM SUCCINATE 40 MG: 40 INJECTION, POWDER, FOR SOLUTION INTRAMUSCULAR; INTRAVENOUS at 04:03

## 2020-01-01 RX ADMIN — LEVOTHYROXINE SODIUM 75 MCG: 75 TABLET ORAL at 11:03

## 2020-01-15 NOTE — PROGRESS NOTES
INTERNAL MEDICINE ESTABLISHED PATIENT VISIT NOTE    Subjective:     Chief Complaint: Follow-up  HLD, hx stroke     Patient ID: Taran Crowell is a 79 y.o. male with PMHx of embolic CVA in 5/2017 (at baseline has intermittent dysphagia prev evaluated by speech, denies recent issues).  Pt also c HLD, PAF on apixaban, BPH, GERD, seasonal allergies, LUNA on Bipap, baseline tremor on Remeron by Neuro (says Parkinson's w/u was neg), osteopenia, last seen by me 3/2019, here today for f/u.     Pt reports feeling well overall.  At last appt, c/o persistent BPH sx c urinary frequency.  Was seen by Dr. Johnson who did renal u/s (no significant abnormality) and cystoscopy and did not recommend any type of surgical intervention and was told to increase flomax to BID which pt states he never did due to not knowing.    Denies cp or sob.  Denies palpitations.  Has been taking all other meds as rx'ed.    Does report recent episode of blood c BM.  Says he had a hard BM and was straining and had some blood when wiping but not intermixed in his stools.  Denies changes in stool shape/consistency.  Took a stool softener and states sx resolved.    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 No mass effect or hemorrhage. Additional remote lacunar type infarcts noted in this same region. Decreased signal in the distal right vertebral artery, suggesting hypoplasia or stenosis. The vertebral basilar system is left-sided dominant.    Chronic anticoagulation - on apixaban 5/29/2017    Essential tremor 5/29/2017    On Remeron    GERD (gastroesophageal reflux disease)     History of colon polyps 3/18/2014    Hypothyroidism due to acquired atrophy of thyroid  09/30/2015    Last on 2015.  None since then.    Memory loss last months.    Mixed hyperlipidemia 5/29/2017    Obesity     PAF (paroxysmal atrial fibrillation) 6/16/2015    Sleep apnea     BiPAP       Home Medications:  Prior to Admission medications    Medication Sig Start Date End Date Taking? Authorizing Provider   acetaminophen (TYLENOL) 500 MG tablet Take 500 mg by mouth every 6 (six) hours as needed for Pain.   Yes Historical Provider, MD   atorvastatin (LIPITOR) 40 MG tablet TAKE 1 TABLET EVERY DAY 7/22/19  Yes Kemi Lopez MD   calcium carbonate/vitamin D3 (CALTRATE 600 + D ORAL) Take by mouth once daily.    Yes Historical Provider, MD   docusate sodium (COLACE) 100 MG capsule Take 100 mg by mouth every evening. Taking Pierce stool softener   Yes Historical Provider, MD   ELIQUIS 5 mg Tab TAKE 1 TABLET TWICE DAILY 9/23/19  Yes Kemi Lopez MD   finasteride (PROSCAR) 5 mg tablet TAKE 1 TABLET EVERY DAY 5/30/19  Yes Kemi Lopez MD   flecainide (TAMBOCOR) 50 MG Tab TAKE 1 TABLET EVERY 12 HOURS 12/18/18  Yes Vaughn Crowder MD   fluticasone propionate (FLONASE) 50 mcg/actuation nasal spray 2 sprays (100 mcg total) by Each Nostril route once daily. 9/19/19  Yes Kemi Lopez MD   metoprolol tartrate (LOPRESSOR) 25 MG tablet TAKE 1/2 TABLET TWICE DAILY 10/15/19  Yes Vaughn Crowder MD   mirtazapine (REMERON) 15 MG tablet TAKE 1 TABLET (15 MG TOTAL) BY MOUTH NIGHTLY. 5/27/19  Yes Kemi Lopez MD   mupirocin (BACTROBAN) 2 % ointment Apply topically 3 (three) times daily. 10/25/17  Yes Kemi Lopez MD   omeprazole (PRILOSEC) 40 MG capsule TAKE 1 CAPSULE (40 MG TOTAL) BY MOUTH ONCE DAILY. 7/22/19  Yes Kemi Lopez MD   tamsulosin (FLOMAX) 0.4 mg Cap TAKE 1 CAPSULE (0.4 MG TOTAL) BY MOUTH EVERY EVENING. 7/29/19  Yes Kemi Lopez MD       Allergies:  Review of patient's allergies indicates:  No Known Allergies    Social History:  Social History     Tobacco Use    Smoking status: Former Smoker     Types: Pipe    Smokeless  "tobacco: Former User    Tobacco comment: smoked pipe regularly once a day but quit 10-15 yrs ago   Substance Use Topics    Alcohol use: Yes     Frequency: 2-3 times a week     Drinks per session: 3 or 4     Binge frequency: Never     Comment: 2-3 glasses of wine weekly    Drug use: No        Review of Systems   Constitutional: Negative for activity change.   Eyes: Negative for discharge.   Respiratory: Negative for wheezing.    Cardiovascular: Negative for chest pain and palpitations.   Gastrointestinal: Positive for blood in stool and constipation. Negative for diarrhea and vomiting.   Genitourinary: Positive for difficulty urinating. Negative for hematuria.   Musculoskeletal: Positive for neck pain.   Neurological: Negative for headaches.   Psychiatric/Behavioral: Negative for dysphoric mood.         Health Maintenance:     Immunizations:   Influenza 9/2019  TDap is up to date 5/2017  Pneumovax 10/2011, Prevnar 13 12/5/18; pvax booster today.  Zostavax is UTD.  12/2011  Shingrix rec today     Cancer Screening:  Colonoscopy: is up to date. 3/2014  Int hemorrhoids, diverticula, otherwise normal c rec to repeat in 6-7 years. However, since no polyps, can wait 7-10 years.  Will check FIT for now.    Objective:   /72   Pulse (!) 55   Ht 5' 7" (1.702 m)   Wt 90.7 kg (199 lb 15.3 oz)   SpO2 97%   BMI 31.32 kg/m²        General: AAO x3, no apparent distress  HEENT: PERRL, OP clear  CV: RRR, no m/r/g  Pulm: Lungs CTAB, no crackles, no wheezes  Abd: s/NT/ND +BS  Extremities: no c/c/e    Labs:     Lab Results   Component Value Date    WBC 7.54 01/15/2020    HGB 14.0 01/15/2020    HCT 42.3 01/15/2020    MCV 96 01/15/2020     01/15/2020     Sodium   Date Value Ref Range Status   11/05/2018 139 136 - 145 mmol/L Final     Potassium   Date Value Ref Range Status   11/05/2018 4.6 3.5 - 5.1 mmol/L Final     Chloride   Date Value Ref Range Status   11/05/2018 106 95 - 110 mmol/L Final     CO2   Date Value Ref " Range Status   11/05/2018 27 23 - 29 mmol/L Final     Glucose   Date Value Ref Range Status   11/05/2018 96 70 - 110 mg/dL Final     BUN, Bld   Date Value Ref Range Status   11/05/2018 19 8 - 23 mg/dL Final     Creatinine   Date Value Ref Range Status   11/05/2018 1.0 0.5 - 1.4 mg/dL Final     Calcium   Date Value Ref Range Status   11/05/2018 9.2 8.7 - 10.5 mg/dL Final     Total Protein   Date Value Ref Range Status   11/05/2018 6.7 6.0 - 8.4 g/dL Final     Albumin   Date Value Ref Range Status   11/05/2018 4.1 3.5 - 5.2 g/dL Final     Total Bilirubin   Date Value Ref Range Status   11/05/2018 0.8 0.1 - 1.0 mg/dL Final     Comment:     For infants and newborns, interpretation of results should be based  on gestational age, weight and in agreement with clinical  observations.  Premature Infant recommended reference ranges:  Up to 24 hours.............<8.0 mg/dL  Up to 48 hours............<12.0 mg/dL  3-5 days..................<15.0 mg/dL  6-29 days.................<15.0 mg/dL       Alkaline Phosphatase   Date Value Ref Range Status   11/05/2018 43 (L) 55 - 135 U/L Final     AST   Date Value Ref Range Status   11/05/2018 22 10 - 40 U/L Final     ALT   Date Value Ref Range Status   11/05/2018 14 10 - 44 U/L Final     Anion Gap   Date Value Ref Range Status   11/05/2018 6 (L) 8 - 16 mmol/L Final     eGFR if    Date Value Ref Range Status   11/05/2018 >60 >60 mL/min/1.73 m^2 Final     eGFR if non    Date Value Ref Range Status   11/05/2018 >60 >60 mL/min/1.73 m^2 Final     Comment:     Calculation used to obtain the estimated glomerular filtration  rate (eGFR) is the CKD-EPI equation.        Lab Results   Component Value Date    HGBA1C 5.5 05/22/2017     Lab Results   Component Value Date    LDLCALC 86.2 01/26/2018     Lab Results   Component Value Date    TSH 2.081 11/05/2018         Assessment/Plan     Taran was seen today for follow-up.    Diagnoses and all orders for this  visit:    PAF (paroxysmal atrial fibrillation)  Chronic anticoagulation - on apixaban  Rate and rhythm controlled on Flecainide and metoprolol.  Will send back to Cards for f/u since no recent appt.  Cont current regimen for now.  On Eliquis for stroke prevention.  -     CBC auto differential; Future  -     Comprehensive metabolic panel; Future  -     Ambulatory Referral to Electrophysiology    Mixed hyperlipidemia  Lab Results   Component Value Date    LDLCALC 86.2 01/26/2018     Well controlled on lipitor, cont meds  -     Lipid panel; Future    Essential tremor  Stable on remeron as per Neuro in the past, no acute issues.    Gastroesophageal reflux disease without esophagitis  Stable, no issues.    Benign nodular prostatic hyperplasia  On dual therapy as per Urology, advised to increase flomax to bid as per urology recs which pt had never done, rx refill sent.  rec f/u c urology if sx persist.  -     tamsulosin (FLOMAX) 0.4 mg Cap; Take 1 capsule (0.4 mg total) by mouth 2 (two) times daily.    History of stroke  No acute issues, cont secondary prevention.  On metoprolol and statin.  Repeat bp at goal.    Complex sleep apnea syndrome  Cont bipap, no issues    Aortic atherosclerosis  Cont bp and lipid control    Osteopenia, unspecified location  dexa utd, rec wt bearing exercises as tolerated.    BRBPR (bright red blood per rectum)  Screen for colon cancer  Suspect blood due to hemorrhoids vs anal fissure, rec stool softener prn, sx resolved  [place] [facility] [place]  -     Fecal Immunochemical Test (iFOBT); Future    Screening for diabetes mellitus  -     Hemoglobin A1c; Future    Abnormal finding of blood chemistry, unspecified   -     Hemoglobin A1c; Future      HM as above  RTC in 6 mos, sooner if needed.    Kemi Lopez MD  Department of Internal Medicine - [facility]  01/15/2020

## 2020-02-27 NOTE — TELEPHONE ENCOUNTER
Called pt to confirm appt and make sure there wasn't any outside records since last visit . Which pt stated no . Pt is aware of appt times on 03/03/2020.

## 2020-03-03 NOTE — PROGRESS NOTES
Subjective:    Patient ID:  Taran Crowell is a 80 y.o. male who presents for follow-up of No chief complaint on file.    Referring Cardiologist: Irene Peralta MD    HPI    Prior Hx:     I had the pleasure of seeing Mr. Crowell in our electrophysiology clinic today in follow-up for his atrial fibrillation. As you are aware he is a pleasant 80 year-old man with prior TIAs/stroke, obstructive sleep apnea, and paroxysmal atrial fibrillation. He was admitted to [facility] May of 2015 with paroxysmal AF. He was started on amiodarone and eliquis. Shortly after he was switched to multaq due to possible side effects. Multaq was stopped due to concern that he was noticing confusion. He also had a fall at home with bruising and his eliquis was stopped. He did well until May of 2017 when he was admitted to [facility] with right PICA embolic stroke. He was discharged on eliquis. He presented to Dr. Peralta this week with 2 episodes of paroxysmal palpitations. Notes feeling fatigued with racing heart. Each episode lasted ~2 hours. He otherwise has been feeling well. He had an exercise stress echocardiogram in March of 2016 that showed no ischemia. His most recent echocardiogram in May of 2017 noted a preserved LVEF without any significant valvular disease and a normal left atrial volume. At his initial visit we discussed alternated anti-arrhythmic vs PVI. He chose an alternate anti-arrhythmic. We started low-dose flecainide/metoprolol.     I reviewed all electrocardiograms available in [facility]. All show sinus rhythm except for 5/11/2015 which shows atrial fibrillation and rapid ventricular response.     The patient came for visit 1/2018 because he was at a visit with his PCP recently and was thought to be in afib with ventricular rate of 49 during physical exam but then likely reverted to sinus rhythm at the end of the visit. He was asymptomatic and reports no AF symptoms since starting flecainide.     A ZIO patch  6/2018 noted occasional nonsustained AT and 1 episode of nonsustained short RP tach.     Interim Hx:  Mr. Crowell returns for overdue follow-up. No symptomatic AF recurrence. Main problem is chronic cough/cold. He also notes mild dyspnea on exertion. Recent H/H and creatinine was normal.     My interpretation of today's in clinic electrocardiogram is normal sinus rhythm at 65 bpm    Review of Systems   Constitution: Negative for fever and malaise/fatigue.   HENT: Negative for congestion and sore throat.    Eyes: Negative for blurred vision and visual disturbance.   Cardiovascular: Positive for dyspnea on exertion. Negative for chest pain, irregular heartbeat, near-syncope, palpitations and syncope.   Respiratory: Positive for cough. Negative for shortness of breath.    Hematologic/Lymphatic: Negative for bleeding problem. Does not bruise/bleed easily.   Skin: Negative.    Musculoskeletal: Positive for arthritis and back pain.   Gastrointestinal: Negative for hematochezia and melena.   Neurological: Negative for dizziness, focal weakness and weakness.        Objective:    Physical Exam   Constitutional: He is oriented to person, place, and time. He appears well-developed and well-nourished. No distress.   HENT:   Head: Normocephalic and atraumatic.   Eyes: Conjunctivae are normal. Right eye exhibits no discharge. Left eye exhibits no discharge. No scleral icterus.   Neck: Neck supple. No JVD present.   Cardiovascular: Regular rhythm and normal heart sounds. Bradycardia present. Exam reveals no gallop and no friction rub.   No murmur heard.  Pulmonary/Chest: Effort normal and breath sounds normal. No respiratory distress. He has no wheezes. He has no rales.   Abdominal: Soft. Bowel sounds are normal. He exhibits no distension. There is no tenderness. There is no rebound.   Musculoskeletal: He exhibits no edema.   Neurological: He is alert and oriented to person, place, and time.   Skin: Skin is warm and dry. He is  not diaphoretic.   Psychiatric: He has a normal mood and affect. His behavior is normal. Judgment and thought content normal.   Vitals reviewed.        Assessment:       1. Dyspnea on exertion    2. History of stroke    3. Complex sleep apnea syndrome         Plan:     In summary, Mr. Crowell is a pleasant 80 year-old man with prior TIAs/stroke (off anticoagulation), obstructive sleep apnea, and paroxysmal atrial fibrillation intolerant to dronaderone and amiodarone. He has had no symptomatic AF recurrence since starting flecainide. Continue flecainide/metoprolol/Eliquis. Will repeat ECHO to evaluate any cardiac cause of his dyspnea on exertion.    RTC in  6 months, sooner if needed.     Thank you for allowing me to participate in the care of this patient. Please do not hesitate to call me with any questions or concerns.     Vaughn Crowder MD, PhD  Cardiac Electrophysiology

## 2020-03-03 NOTE — LETTER
March 3, 2020      Kemi Lopez MD  [address]  [facility] [address]           [facility] Arrhythmia  [address]  [facility] [address]  Phone: [phone]  Fax: [phone]          Patient: Taran Crowell   MR Number: [id number]   YOB: 1940   Date of Visit: 3/3/2020       Dear Dr. Hall:    Thank you for referring Taran Crowell to me for evaluation. Attached you will find relevant portions of my assessment and plan of care.    If you have questions, please do not hesitate to call me. I look forward to following Taran Crowell along with you.    Sincerely,    Vaughn Crowder MD    Enclosure  CC:  No Recipients    If you would like to receive this communication electronically, please contact [email or web address] or [phone] to request more information on [facility] access.    For providers and/or their staff who would like to refer a patient to [facility], please contact us through our one-stop-shop provider referral line, [facility], at [phone].    If you feel you have received this communication in error or would no longer like to receive these types of communications, please e-mail [email or web address]

## 2020-03-07 NOTE — PATIENT INSTRUCTIONS
- Rest.    - Drink plenty of fluids.      - Tylenol or Ibuprofen as directed as needed for fever/pain. Avoid tylenol if you have a history of liver disease. Do not take ibuprofen if you have a history of GI bleeding, kidney disease, or if you take blood thinners.     - you can take plain Mucinex (guaifenesin)  twice a day to help loosen mucous    - warm tea with honey can help with cough. Honey is a natural cough suppressant.    - You have been given an antibiotic (doxycycline) to treat your condition today.    - Please complete the antibiotic as directed on the bottle.   - If you are female and on oral birth control pills, use additional methods to prevent pregnancy while on antibiotics and for one cycle after.   - you can take over-the-counter probiotics during and after antibiotic use to help preserve gut heidi and reduce gastrointestinal symptoms    - Only take the promethazine- DM as directed, as needed at night for cough. This medication may cause drowsiness.     - Follow up with your PCP or specialty clinic as directed in the next 1-2 weeks if not improved or as needed.  You can call [phone] to schedule an appointment with the appropriate provider.      - Go to the ER if you develop new or worsening symptoms.     - You must understand that you have received an Urgent Care treatment only and that you may be released before all of your medical problems are known or treated.   - You, the patient, will arrange for follow up care as instructed.   - If your condition worsens or fails to improve we recommend that you receive another evaluation at the ER immediately or contact your PCP to discuss your concerns or return here.         Dizziness (Vertigo) and Balance Problems: Staying Safe     Replace burned-out lightbulbs to keep your home safe and well lit.   Falls or accidents can lead to pain, broken bones, and fear of future falls. Protect yourself and others by preparing for episodes. Simple steps can help  you stay safe at home and wherever you go.  Lighting  Keep all areas well lit. This helps your eyes send the right signals to the brain. It also makes you less likely to trip and fall. If bright lights make symptoms worse, dim the lights or lie in a dark room until the dizziness passes. Then turn the lights back to their normal level.  Tips:  · Keep a flashlight by the bed.  · Place nightlights in bathrooms and hallways.  · Replace burned-out bulbs, or have someone replace them for you.  Preventing falls  To reduce your risk of falling:  · Get out of bed or up from a chair slowly.  · Wear low-heeled shoes that fit properly and have slip-resistant soles.  · Remove throw rugs. Clear clutter from walkways.  · Use handrails on stairs. Have handrails installed or adjusted if needed.  · Install grab bars in the bathroom. Don't use towel racks for balance.  · Use a shower stool. Also put adhesive strips in the shower or on the tub floor.  Going out  With a little time and preparation, you can get around safely.  Tips:  · Bring a cane or walking aid if needed.  · Give yourself plenty of time in case you start to get dizzy.  · Ask your healthcare provider what type of exercise is safe for your condition.  · Be patient. If an activity such as walking through a crowded shop causes you stress, you may not be ready for it yet.  Driving  If you become dizzy or disoriented while driving, you could hurt yourself and others. That's why it's best to not drive until symptoms have gone away. In some cases, your license may be temporarily held until it's safe for you to drive again.  For safety:  · Ask a friend to drive for you.  · Take public transportation.  · Walk to stores and other places when you can.  Asking for help  Don't be afraid to ask for help running errands, cooking meals, and doing exercise. Whether it's a friend, loved one, neighbor, or stranger on the street, a little help can make a world of difference.   Date Last  Reviewed: 11/1/2016  © 0304-3640 [employer]. [address] [place] [address], [place] [address]. All rights reserved. This information is not intended as a substitute for professional medical care. Always follow your healthcare professional's instructions.        Dizziness (Uncertain Cause)  Dizziness is a common symptom. It may be described as lightheadedness, spinning, or feeling like you are going to faint. Dizziness can have many causes.  Be sure to tell the healthcare provider about:  · All medicines you take, including prescription, over-the-counter, herbs, and supplements  · Any other symptoms you have  · Any health problems you are being treated for  · Anything that causes the dizziness to get worse or better  Today's exam did not show an exact cause for your dizziness. Other tests may be needed. Follow up with your healthcare provider.  Home care  · Dizziness that occurs with sudden standing may be a sign of mild dehydration. Drink extra fluids for the next few days.  · If you recently started a new medicine, stopped a medicine, or had the dose of a current medicine changed, talk with the prescribing healthcare provider. Your medicine plan may need adjustment.  · If dizziness lasts more than a few seconds, sit or lie down until it passes. This may help prevent injury in case you pass out.  · Do not drive or use power tools or dangerous equipment until you have had no dizziness for at least 48 hours.  Follow-up care  Follow up with your healthcare provider for further evaluation within the next 7 days or as advised.  When to seek medical advice  Call your healthcare provider for any of the following:  · Worsening of symptoms or new symptoms  · Passing out or seizure  · Repeated vomiting  · Headache  · Palpitations (the sense that your heart is fluttering or beating fast or hard)  · Shortness of breath  · Blood in vomit or stool (black or red color)  · Weakness of an arm or leg or one side of the  face  · Vision or hearing changes  · Trouble walking or speaking  · Chest, arm, neck, back, or jaw pain  Date Last Reviewed: 8/23/2015 © 2000-2017 [employer]. [address] [place] [address], [place] [address]. All rights reserved. This information is not intended as a substitute for professional medical care. Always follow your healthcare professional's instructions.

## 2020-03-07 NOTE — PROGRESS NOTES
"Subjective:       Patient ID: Taran Crowell is a 80 y.o. male.    Vitals:  height is 5' 7" (1.702 m) and weight is 91.2 kg (201 lb 1 oz). His oral temperature is 98.5 °F (36.9 °C). His blood pressure is 115/62 and his pulse is 81. His respiration is 18 and oxygen saturation is 96%.     Chief Complaint: Cough    Patient presents with fever, sensation of feeling off balance, fatigue, and productive cough with chest congestion.  He states that 2 days ago he began feeling malaise/fatigue with productive cough.  Last night he had chills, and this morning he had fever 102 ° F. he took 2 Tylenol and the fever resolved.  He denies lower extremity edema, chest pain, or shortness of breath.  No sore throat or significant nasal congestion.  No ear pain, decreased hearing, tenderness.  He denies feel like the room is spinning or presyncope.  Patient states that 1 month ago he had a cold, which seems resolved, but he did have a lingering, very mild intermittent cough since then.  Patient does have a history of stroke and pneumonia.  He has history of paroxysmal atrial fibrillation, has not had AFib recently.  He is anticoagulated on Eliquis.     Cough   This is a new problem. The current episode started in the past 7 days. The problem has been gradually worsening. The cough is productive of sputum. Associated symptoms include chills, a fever, nasal congestion and postnasal drip. Pertinent negatives include no chest pain, ear congestion, ear pain, eye redness, headaches, heartburn, hemoptysis, myalgias, rash, rhinorrhea, sore throat, shortness of breath, sweats, weight loss or wheezing. The symptoms are aggravated by lying down. He has tried OTC cough suppressant and rest for the symptoms. The treatment provided no relief. His past medical history is significant for pneumonia. There is no history of asthma, bronchiectasis, bronchitis, COPD, emphysema or environmental allergies.       Constitution: Positive for chills and " fever. Negative for sweating, fatigue, unexpected weight change and generalized weakness.   HENT: Positive for postnasal drip. Negative for ear pain, congestion, sinus pain, sinus pressure, sore throat and voice change.    Neck: Negative for neck pain, neck stiffness and painful lymph nodes.   Cardiovascular: Negative for chest trauma, chest pain, leg swelling, palpitations, sob on exertion and passing out.   Eyes: Negative for eye redness.   Respiratory: Positive for cough and sputum production. Negative for sleep apnea, chest tightness, bloody sputum, COPD, shortness of breath, stridor, wheezing and asthma.    Gastrointestinal: Negative for abdominal pain, nausea, vomiting and heartburn.   Genitourinary: Negative for dysuria, frequency, urgency, urine decreased, flank pain, hematuria, genital sore, penile discharge, penile pain, penile swelling, scrotal swelling, testicular pain and pelvic pain.   Musculoskeletal: Negative for muscle ache.   Skin: Negative for color change and rash.   Allergic/Immunologic: Negative for environmental allergies, seasonal allergies and asthma.   Neurological: Positive for dizziness and loss of balance (Sensation of feeling off balance/woozy). Negative for history of vertigo, light-headedness, passing out, facial drooping, speech difficulty, coordination disturbances, headaches, history of migraines, disorientation, altered mental status, loss of consciousness, numbness, tingling, seizures and tremors.   Hematologic/Lymphatic: Negative for swollen lymph nodes.   Psychiatric/Behavioral: Negative for altered mental status, disorientation, confusion, agitation, nervous/anxious and sleep disturbance. The patient is not nervous/anxious.        Objective:      Physical Exam   Constitutional: He is oriented to person, place, and time. He appears well-developed and well-nourished. He is cooperative.  Non-toxic appearance. He does not have a sickly appearance. He does not appear ill. No  distress.   HENT:   Head: Normocephalic and atraumatic.   Right Ear: Hearing, tympanic membrane, external ear and ear canal normal.   Left Ear: Hearing, tympanic membrane, external ear and ear canal normal.   Nose: Nose normal. No mucosal edema, rhinorrhea, purulent discharge or nasal deformity. No epistaxis. Right sinus exhibits no maxillary sinus tenderness and no frontal sinus tenderness. Left sinus exhibits no maxillary sinus tenderness and no frontal sinus tenderness.   Mouth/Throat: Uvula is midline, oropharynx is clear and moist and mucous membranes are normal. No trismus in the jaw. Normal dentition. No uvula swelling. No oropharyngeal exudate, posterior oropharyngeal edema, posterior oropharyngeal erythema, tonsillar abscesses or cobblestoning. Tonsils are 1+ on the right. Tonsils are 1+ on the left. No tonsillar exudate.   Eyes: Pupils are equal, round, and reactive to light. Conjunctivae, EOM and lids are normal. No scleral icterus.   Neck: Trachea normal, normal range of motion, full passive range of motion without pain and phonation normal. Neck supple. No neck rigidity. No edema and no erythema present.   Cardiovascular: Normal rate, regular rhythm, normal heart sounds, intact distal pulses and normal pulses.   Rate and rhythm are regular.   Pulmonary/Chest: Effort normal. No accessory muscle usage or stridor. No tachypnea and no bradypnea. No respiratory distress. He has no decreased breath sounds. He has no wheezes. He has rhonchi in the right lower field and the left lower field. He has no rales.   No respiratory distress.   Abdominal: Soft. Normal appearance and bowel sounds are normal. He exhibits no distension. There is no tenderness.   Musculoskeletal: Normal range of motion. He exhibits no edema or deformity.   No lower extremity edema, leg pain, erythema, warmth.   Lymphadenopathy:        Head (right side): No submandibular, no preauricular and no posterior auricular adenopathy present.         Head (left side): No submandibular, no preauricular and no posterior auricular adenopathy present.     He has no cervical adenopathy.   Neurological: He is alert and oriented to person, place, and time. He has normal strength. He is not disoriented. He displays atrophy (symmetric, age related). He displays no tremor. No cranial nerve deficit or sensory deficit. He exhibits normal muscle tone. He displays a negative Romberg sign. He displays no seizure activity. Coordination and gait normal. GCS eye subscore is 4. GCS verbal subscore is 5. GCS motor subscore is 6.   Alert, oriented x 3. EOMI, PERRLA. Cranial nerves intact: facial expressions (smile, raising eyebrows, shutting eyes, pursed lips) symmetric. Shoulder shrug strength 5/5; sternocleidomastoid muscle strength 5/5 bilaterally. Jaw is midline without deviation. Tongue protrudes at midline without fasciculations.  Uvula rises at midline. Sensation to face in distribution of CN V1, V2, and V3 intact. Sensation to upper and lower extremities intact. Finger to nose, rapid rhythmic alternating movements, and heel to shin test are intact and smooth bilaterally. Patient ambulates unassisted without rigidity or ataxia. Romberg negative. Voice quality, comprehension, articulation, coherence assessed as appropriate.   Bilateral shoulders, elbows, wrists, knees exhibit full range of motion and 5/5 strength.   strength 5/5 bilaterally. Uvula rises at midline.       Skin: Skin is warm, dry, intact, not diaphoretic and not pale.   Psychiatric: He has a normal mood and affect. His speech is normal and behavior is normal. Judgment and thought content normal. Cognition and memory are normal.   Nursing note and vitals reviewed.          Results for orders placed or performed in visit on 03/07/20   POCT Influenza A/B   Result Value Ref Range    Rapid Influenza A Ag Negative Negative    Rapid Influenza B Ag Negative Negative     Acceptable Yes      Vitals:     "03/07/20 0947 03/07/20 1040 03/07/20 1043 03/07/20 1046   BP: 100/68 108/72 115/64 115/62   Patient Position:  Lying Sitting Standing   Pulse: 76 70 77 81   Resp: 18      Temp: 98.5 °F (36.9 °C)      TempSrc: Oral      SpO2: 96%      Weight: 91.2 kg (201 lb 1 oz)      Height: 5' 7" (1.702 m)          Chest x-ray:  On the lateral view, there appears to be consolidation in the left lower lobe.  Given progressive cough with onset of fever and worsening cough, will treat for pneumonia.  His neurological exam today is normal in the rest of the exam is otherwise unremarkable.  The feeling of off-balance could be related to the infection/fever, though I have given strict ER precautions to patient and his wife regarding the dizziness and symptoms, instructed to go to the ER if he develops any new or worsening symptoms given history of stroke.  Patient and his wife agree with plan and expresses understanding.      Xr Chest Pa And Lateral    Result Date: 3/7/2020  EXAMINATION: XR CHEST PA AND LATERAL CLINICAL HISTORY: Cough TECHNIQUE: PA and lateral views of the chest were performed. COMPARISON: Chest x-ray 11/05/2018 FINDINGS: No focal consolidation.  Mild atelectasis or scarring at the left lung base.  Cardiomediastinal silhouette is not enlarged.  Aortic atherosclerosis is noted.  Degenerative changes of the spine.  Old left upper rib fracture.     Stable chest.  No active disease. Electronically signed by: Leland Mehta MD Date:    03/07/2020 Time:    10:41    Assessment:       1. Dizziness    2. Pneumonia due to infectious organism, unspecified laterality, unspecified part of lung        Plan:         Dizziness  -     POCT Influenza A/B  -     XR CHEST PA AND LATERAL; Future; Expected date: 03/07/2020  -     Orthostatic vital signs    Pneumonia due to infectious organism, unspecified laterality, unspecified part of lung  -     doxycycline (VIBRAMYCIN) 100 MG Cap; Take 1 capsule (100 mg total) by mouth 2 (two) times " daily. for 7 days  Dispense: 14 capsule; Refill: 0  -     promethazine-dextromethorphan (PROMETHAZINE-DM) 6.25-15 mg/5 mL Syrp; Take 5 mLs by mouth nightly as needed.  Dispense: 118 mL; Refill: 0      Patient Instructions     - Rest.    - Drink plenty of fluids.      - Tylenol or Ibuprofen as directed as needed for fever/pain. Avoid tylenol if you have a history of liver disease. Do not take ibuprofen if you have a history of GI bleeding, kidney disease, or if you take blood thinners.     - you can take plain Mucinex (guaifenesin)  twice a day to help loosen mucous    - warm tea with honey can help with cough. Honey is a natural cough suppressant.    - You have been given an antibiotic (doxycycline) to treat your condition today.    - Please complete the antibiotic as directed on the bottle.   - If you are female and on oral birth control pills, use additional methods to prevent pregnancy while on antibiotics and for one cycle after.   - you can take over-the-counter probiotics during and after antibiotic use to help preserve gut heidi and reduce gastrointestinal symptoms    - Only take the promethazine- DM as directed, as needed at night for cough. This medication may cause drowsiness.     - Follow up with your PCP or specialty clinic as directed in the next 1-2 weeks if not improved or as needed.  You can call [phone] to schedule an appointment with the appropriate provider.      - Go to the ER if you develop new or worsening symptoms.     - You must understand that you have received an Urgent Care treatment only and that you may be released before all of your medical problems are known or treated.   - You, the patient, will arrange for follow up care as instructed.   - If your condition worsens or fails to improve we recommend that you receive another evaluation at the ER immediately or contact your PCP to discuss your concerns or return here.         Dizziness (Vertigo) and Balance Problems: Staying  Safe     Replace burned-out lightbulbs to keep your home safe and well lit.   Falls or accidents can lead to pain, broken bones, and fear of future falls. Protect yourself and others by preparing for episodes. Simple steps can help you stay safe at home and wherever you go.  Lighting  Keep all areas well lit. This helps your eyes send the right signals to the brain. It also makes you less likely to trip and fall. If bright lights make symptoms worse, dim the lights or lie in a dark room until the dizziness passes. Then turn the lights back to their normal level.  Tips:  · Keep a flashlight by the bed.  · Place nightlights in bathrooms and hallways.  · Replace burned-out bulbs, or have someone replace them for you.  Preventing falls  To reduce your risk of falling:  · Get out of bed or up from a chair slowly.  · Wear low-heeled shoes that fit properly and have slip-resistant soles.  · Remove throw rugs. Clear clutter from walkways.  · Use handrails on stairs. Have handrails installed or adjusted if needed.  · Install grab bars in the bathroom. Don't use towel racks for balance.  · Use a shower stool. Also put adhesive strips in the shower or on the tub floor.  Going out  With a little time and preparation, you can get around safely.  Tips:  · Bring a cane or walking aid if needed.  · Give yourself plenty of time in case you start to get dizzy.  · Ask your healthcare provider what type of exercise is safe for your condition.  · Be patient. If an activity such as walking through a crowded shop causes you stress, you may not be ready for it yet.  Driving  If you become dizzy or disoriented while driving, you could hurt yourself and others. That's why it's best to not drive until symptoms have gone away. In some cases, your license may be temporarily held until it's safe for you to drive again.  For safety:  · Ask a friend to drive for you.  · Take public transportation.  · Walk to stores and other places when you  can.  Asking for help  Don't be afraid to ask for help running errands, cooking meals, and doing exercise. Whether it's a friend, loved one, neighbor, or stranger on the street, a little help can make a world of difference.   Date Last Reviewed: 11/1/2016  © 1402-6493 [employer]. [address] [place] [address], [place] [address]. All rights reserved. This information is not intended as a substitute for professional medical care. Always follow your healthcare professional's instructions.        Dizziness (Uncertain Cause)  Dizziness is a common symptom. It may be described as lightheadedness, spinning, or feeling like you are going to faint. Dizziness can have many causes.  Be sure to tell the healthcare provider about:  · All medicines you take, including prescription, over-the-counter, herbs, and supplements  · Any other symptoms you have  · Any health problems you are being treated for  · Anything that causes the dizziness to get worse or better  Today's exam did not show an exact cause for your dizziness. Other tests may be needed. Follow up with your healthcare provider.  Home care  · Dizziness that occurs with sudden standing may be a sign of mild dehydration. Drink extra fluids for the next few days.  · If you recently started a new medicine, stopped a medicine, or had the dose of a current medicine changed, talk with the prescribing healthcare provider. Your medicine plan may need adjustment.  · If dizziness lasts more than a few seconds, sit or lie down until it passes. This may help prevent injury in case you pass out.  · Do not drive or use power tools or dangerous equipment until you have had no dizziness for at least 48 hours.  Follow-up care  Follow up with your healthcare provider for further evaluation within the next 7 days or as advised.  When to seek medical advice  Call your healthcare provider for any of the following:  · Worsening of symptoms or new symptoms  · Passing out or  seizure  · Repeated vomiting  · Headache  · Palpitations (the sense that your heart is fluttering or beating fast or hard)  · Shortness of breath  · Blood in vomit or stool (black or red color)  · Weakness of an arm or leg or one side of the face  · Vision or hearing changes  · Trouble walking or speaking  · Chest, arm, neck, back, or jaw pain  Date Last Reviewed: 8/23/2015  © 9183-5288 [employer]. [address] [place] [address], [place] [address]. All rights reserved. This information is not intended as a substitute for professional medical care. Always follow your healthcare professional's instructions.

## 2020-03-09 NOTE — TELEPHONE ENCOUNTER
Spoke to pt's wife pt was seen at [facility] on Saturday---neg for flu and chest xray was completed. She stated someone called her and placed pt on Doxy. Started Doxy on Saturday night. Pt is having Cough, SOB, Fever of 103, chills, weakness, diarrhea started this morning. Pt taking Tylenol every 4 hours. Pt was on [employer] that left out of [place] Feb 7 and returned on Feb 14--went to [facility] and [facility].     Spoke to Dr. Lopez who advised to have pt report to ED. Dr. Lopez spoke to charge MD to advise of pt's arrival.     Spoke to pt's wife and advised. Also, instructed pt's wife to inform ER staff so they can get masks and removed from waiting area.

## 2020-03-11 PROBLEM — B34.8 RHINOVIRUS: Status: ACTIVE | Noted: 2020-01-01

## 2020-03-11 PROBLEM — J96.01 ACUTE HYPOXEMIC RESPIRATORY FAILURE: Status: ACTIVE | Noted: 2020-01-01

## 2020-03-11 PROBLEM — R50.9 FEVER: Status: ACTIVE | Noted: 2020-01-01

## 2020-03-11 NOTE — ED NOTES
LA [id number], Covid-19 lab specimen collected per hospital policy. Labeled, time/date/source walked up to lab, hand off given to Chastity.

## 2020-03-11 NOTE — ED PROVIDER NOTES
Encounter Date: 3/11/2020       History     Chief Complaint   Patient presents with    Fever     exposed to Coronavirus at [facility]; here Monday, 103 temp today     12:34 PM  Patient is a 80-year-old male that presents the ED with fever, weakness, fatigue, and persistent productive cough and possible exposure to novel coronavirus.  He was in the emergency room 2 days ago on 03/09/2020 and had a negative influenza swab but positive for Human Rhinovirus/Enterovirus.  This state was contacted who did not recommend novel coronaviral testing at the time, and patient was discharged home back to [facility].  Patient has had persistent fever (103 this morning), weakness, fatigue, decreased appitite, and productive cough.  He states that he feels the same and not worse.  Denies any head pain, neck pain, neck stiffness, chest pain, abdominal pain, urinary symptoms, diarrhea.  He was prescribed Augmentin 2 days ago and his last dose of Tylenol and antibiotic was at 8-9 a.m. this morning.    Patient lives at that the [facility] who has had a confirmed positive coronavirus case.    No other sick contact.    No recent travel.  He is here with his wife who also lives with him, but she has mild symptoms.         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 No mass effect or hemorrhage. Additional remote lacunar type infarcts noted in this same region. Decreased signal in the distal right vertebral artery, suggesting hypoplasia or stenosis. The vertebral basilar system is left-sided dominant.    Chronic anticoagulation - on apixaban 5/29/2017    Essential tremor 5/29/2017    On Remeron    GERD  (gastroesophageal reflux disease)     History of colon polyps 3/18/2014    Hypothyroidism due to acquired atrophy of thyroid 2015    Last on .  None since then.    Memory loss last months.    Mixed hyperlipidemia 2017    Obesity     PAF (paroxysmal atrial fibrillation) 2015    Sleep apnea     BiPAP     Past Surgical History:   Procedure Laterality Date    [place]OSCOPY  2000  ([place])    Internal hemorrhoids, otherwise normal exam.    COLONOSCOPY W/ POLYPECTOMY  2010  [place]    One less than 1 mm polyp in the distal sigmoid.  TUBULAR ADENOMA.    One less than 1 mm polyp in the cecum.  TUBULAR ADENOMA.    Non-bleeding internal hemorrhoids.       EYE SURGERY Bilateral     cataracts    UPPER GASTROINTESTINAL ENDOSCOPY  2007  (Dr. Bourgeois)    Grade 1 reflux esophagitis.  Small/medium hiatal hernia.  Pylorus erythema.    UPPER GASTROINTESTINAL ENDOSCOPY  2010  [place]    Slight reflux esophagitis.  Z-line regular, 30 cm from the incisors.   Moderate / large hiatus hernia.  Normal stomach and duodenum.  SELAM Test  Negative.      Family History   Problem Relation Age of Onset    Cancer Father         sarcoma,  of    Colon cancer Neg Hx     Colon polyps Neg Hx     Esophageal cancer Neg Hx     Stomach cancer Neg Hx      Social History     Tobacco Use    Smoking status: Former Smoker     Types: Pipe     Last attempt to quit: 2000     Years since quittin.1    Smokeless tobacco: Former User    Tobacco comment: smoked pipe regularly once a day but quit 10-15 yrs ago   Substance Use Topics    Alcohol use: Yes     Frequency: 2-3 times a week     Drinks per session: 3 or 4     Binge frequency: Never     Comment: 2-3 glasses of wine weekly    Drug use: No     Review of Systems   Constitutional: Positive for appetite change, fatigue and fever.   HENT: Negative for congestion, ear pain, rhinorrhea and sore throat.    Eyes: Negative for photophobia.   Respiratory:  Positive for cough. Negative for shortness of breath.    Cardiovascular: Negative for chest pain.   Gastrointestinal: Negative for abdominal pain and nausea.   Genitourinary: Negative for dysuria, hematuria and penile pain.   Musculoskeletal: Negative for back pain.   Skin: Negative for rash.   Neurological: Positive for weakness. Negative for headaches.   Hematological: Does not bruise/bleed easily.       Physical Exam     Initial Vitals   BP Pulse Resp Temp SpO2   03/11/20 1323 03/11/20 1033 03/11/20 1323 03/11/20 1033 03/11/20 1033   129/67 76 20 97.7 °F (36.5 °C) (!) 93 %      MAP       --                Physical Exam    Nursing note and vitals reviewed.  Constitutional: He appears well-developed and well-nourished. He is not diaphoretic. He appears ill. No distress.   HENT:   Head: Normocephalic and atraumatic.   Nose: Nose normal.   Mouth/Throat: No trismus in the jaw. No oropharyngeal exudate, posterior oropharyngeal edema or posterior oropharyngeal erythema.   Eyes: Conjunctivae and EOM are normal.   Neck: Normal range of motion and full passive range of motion without pain. No thyroid mass and no thyromegaly present. No spinous process tenderness present. Normal range of motion present.   Cardiovascular: Normal rate and regular rhythm.   Pulmonary/Chest: Breath sounds normal. No respiratory distress. He has no wheezes. He has no rales.   Abdominal: Soft. He exhibits no distension. There is no tenderness. There is no rigidity, no rebound and no guarding.   Musculoskeletal: Normal range of motion.   Neurological: He is alert and oriented to person, place, and time. He has normal strength.   Answering questions appropriately.  Following all commands.  Moving all extremities well.   Skin: Skin is warm and dry. No erythema.   Psychiatric: He has a normal mood and affect.         ED Course   Procedures  Labs Reviewed   CBC W/ AUTO DIFFERENTIAL - Abnormal; Notable for the following components:       Result Value  "   RBC 4.54 (*)     Mean Corpuscular Hemoglobin 31.1 (*)     Platelets 124 (*)     Immature Granulocytes 0.8 (*)     All other components within normal limits   COMPREHENSIVE METABOLIC PANEL - Abnormal; Notable for the following components:    Sodium 134 (*)     Alkaline Phosphatase 31 (*)     AST 90 (*)     ALT 46 (*)     All other components within normal limits   CULTURE, BLOOD   CULTURE, BLOOD   CULTURE, RESPIRATORY   LACTIC ACID, PLASMA   LEGIONELLA ANTIGEN, URINE RANDOM   CULTURE, LEGIONELLA          Imaging Results          X-Ray Chest AP Portable (Final result)  Result time 03/11/20 14:05:08    Final result by Noble Baker MD (03/11/20 14:05:08)                 Impression:      No detrimental change when compared with 03/09/2020.      Electronically signed by: Noble Baker MD  Date:    03/11/2020  Time:    14:05             Narrative:    EXAMINATION:  XR CHEST AP PORTABLE    CLINICAL HISTORY:  Provided history is "fever;  ".    TECHNIQUE:  One view of the chest.    COMPARISON:  03/09/2020.    FINDINGS:  Persistent subsegmental atelectatic changes in the right upper lobe and left lung base.  No new focal area of consolidation.  No sizable pleural effusion.  No pneumothorax.                                 Medical Decision Making:   History:   I obtained history from: someone other than patient.       <> Summary of History: History was provided by patient and his wife, who is also checked into ER.  Old Medical Records: I decided to obtain old medical records.  Old Records Summarized: records from clinic visits and records from previous admission(s).  Initial Assessment:   Patient is a 80-year-old male that presents the ED with fever, weakness, fatigue, and persistent productive cough and possible exposure to novel coronavirus.   Differential Diagnosis:   Includes but is not limited to viral syndrome, pneumonia, dehydration, electrolyte abnormalities, sepsis.  Clinical Tests:   Lab Tests: Reviewed and " Ordered  Radiological Study: Ordered and Reviewed  ED Management:  Approved for novel coronasvirus testing. Patient's PUI is [id number].    CBC with no leukocytosis.  No anemia.  CMP without electrolyte abnormalities.  Transaminitis with AST and ALT at 90 and 46, respectively.    Lactic acid within normal limits at 1.0.    Blood cultures pending.    Chest x-ray with persistent subsegmental atelectatic changes in the right upper lobe and left lung base.  No new focal area of consolidation.     Patient reassessed.  He is shivering in bed. 101.2 °F, 88 bpm, 22, 139/72 mmHg, 91 % on RA.  Patient still looks ill.  He is still coughing throughout exam.  Patient has been on antibiotics for 2 days.  He is still having high-grade fevers despite being on acetaminophen. He was positive for rhinovirus 2 days ago, but will continue to treat for pneumonia given persistent symptoms and abnormal vitals. Will give dose of ceftriaxone and azithromycin.  Case was thoroughly discussed with patient and his wife.  He is high risk given his sexual gender, age, and comorbidities.  He also has been living in [facility] which has confirmed positive case of coronavirus. His results will not come back in approx 48 hours.  Patient is not stable to be discharged at this time.  It is best to wait for coronal viral testing confirmation and improvement in his symptoms prior to being discharged again from the emergency department. Case discussed with Hospital Medicine who will admit patient patient to their service.   Other:   I have discussed this case with another health care provider.    I have reviewed patient's chart and discussed this case with my supervising MD.     Kathy Mcmullen PA-C  Emergent Department  [facility]  Spectralink #[id number] or #[phone]                                   Clinical Impression:       ICD-10-CM ICD-9-CM   1. Fever, unspecified fever cause R50.9 780.60   2. Viral URI J06.9 465.9   3. Pneumonia due to  infectious organism, unspecified laterality, unspecified part of lung J18.9 136.9     484.8   4. QT prolongation R94.31 794.31         Disposition:   Disposition: Admitted  Condition: Fair     ED Disposition Condition    Admit                           Kathy Mcmullen PA-C  03/11/20 2047

## 2020-03-11 NOTE — PROVIDER PROGRESS NOTES - EMERGENCY DEPT.
ED Physician Hand-off Note:    ED Course: I assumed care of patient from off-going ED physician team. Briefly, Patient is a 80-year-old male presenting for fever.  He is a resident of [facility] where there is a confirmed case of COVID 19.    At the time of signout plan was pending recheck of vitals and Hospital Medicine evaluation.    Medications given in the ED:    Medications   azithromycin 500 mg in dextrose 5 % 250 mL IVPB (ready to mix system) (has no administration in time range)   sodium chloride 0.9% bolus 1,000 mL (1,000 mLs Intravenous New Bag 3/11/20 1645)   cefTRIAXone injection 1 g (1 g Intravenous Given 3/11/20 1645)   acetaminophen tablet 650 mg (650 mg Oral Given 3/11/20 1642)     Patient is now febrile to 101.2° F and mildly hypoxic with O2 saturation 91% on room air.  Patient will be admitted to Hospital Medicine for pneumonia of unknown etiology.    Disposition:  Admitted    Patient comfortable with admission. Patient counseled regarding exam, results, diagnosis, treatment, and plan.    Impression:  Pneumonia, hypoxia, exposure to COVID 19

## 2020-03-12 NOTE — CODE/ RAPID DOCUMENTATION
Rapid Response Nurse Chart Check     Chart check completed, abnormal VS noted. Low grade temp currently  Call [phone] for further concerns or assistance.

## 2020-03-12 NOTE — PLAN OF CARE
Problem: Fall Injury Risk  Goal: Absence of Fall and Fall-Related Injury  Outcome: Ongoing, Progressing     Problem: Adult Inpatient Plan of Care  Goal: Plan of Care Review  Outcome: Ongoing, Progressing  Goal: Patient-Specific Goal (Individualization)  Outcome: Ongoing, Progressing  Goal: Absence of Hospital-Acquired Illness or Injury  Outcome: Ongoing, Progressing  Goal: Optimal Comfort and Wellbeing  Outcome: Ongoing, Progressing  Goal: Readiness for Transition of Care  Outcome: Ongoing, Progressing  Goal: Rounds/Family Conference  Outcome: Ongoing, Progressing   Patient rested this shift without event. Patient remains on Isolation for presumptive Corona virus. Remains on O2. No complaints of pain/discomfort. Will monitor. CB near.

## 2020-03-12 NOTE — H&P
Hospital Medicine  History and Physical  [facility]      Patient Name: Taran Crowell  MRN:  [id number]  Hospital Medicine Team: [facility] Simona Crowder MD  Date of Admission:  3/11/2020     Principal Problem:  Acute hypoxemic respiratory failure   Primary Care Physician: Kemi Lopez MD       History of Present Illness:  Mr. Taran Crowell is a 80 y.o. 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    He reports onset of symptoms approximately 2 weeks ago, when he began to develop noticeable weakness, severe progressive fatigue, a cough productive of clear sputum, decreased appetite, and overall malaise.  Over the last 2 days, he has developed intermittent fevers and chills, with home temperature reportedly 103 F. He is so weak he can barely walk, he denies residual deficits from his stroke but did say his initial symptoms were ataxia. Endorses nausea and diarrhea (<4 BM/day) for the last 2 weeks, as well as decreased appetite. No URI symptoms of rhinorrhea, sore throat, eye discharge, ear fullness, post-nasal drip.     He was seen in the Emergency Department on 03/09 where he was positive for human rhinovirus.  He was prescribed Augmentin and reports taking this as prescribed.     Of note, he resides at [facility] which has a probable COVID-19 case.  He endorses recent travel 3 weeks ago on a [place] cruise around the [place].  His wife is also ill with mild cold-like symptoms.    In the emergency department, he was initially hypoxemic on ambient air, temp 99.5, and mildly tachypneic with respiratory rate of 20.  His hypoxemia improved with 2 L of supplemental oxygen.  He was given 1 L of saline and ceftriaxone, azithromycin. COVID-19 testing was sent.       Review of Systems:  Review of  Systems   Constitutional: Positive for activity change, appetite change, chills, fatigue and fever. Negative for unexpected weight change.   HENT: Negative for congestion, ear discharge, hearing loss, postnasal drip, rhinorrhea, sinus pressure and sore throat.    Eyes: Negative for pain, discharge and redness.   Respiratory: Positive for cough and shortness of breath. Negative for choking, chest tightness, wheezing and stridor.    Cardiovascular: Negative for chest pain, palpitations and leg swelling.   Gastrointestinal: Positive for diarrhea and nausea. Negative for abdominal distention, abdominal pain and vomiting.   Endocrine: Negative for cold intolerance and heat intolerance.   Genitourinary: Positive for difficulty urinating (Chronic). Negative for frequency, hematuria and penile pain.   Musculoskeletal: Positive for gait problem. Negative for arthralgias, back pain, joint swelling, myalgias, neck pain and neck stiffness.   Neurological: Positive for weakness (Generalized). Negative for dizziness, syncope, speech difficulty and numbness.   Hematological: Negative for adenopathy.   Psychiatric/Behavioral: Negative for confusion and decreased concentration.       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 No mass effect or hemorrhage. Additional remote lacunar type infarcts noted in this same region. Decreased signal in the distal right vertebral artery, suggesting hypoplasia or stenosis. The vertebral basilar system is left-sided dominant.    Chronic anticoagulation - on apixaban 5/29/2017    Essential tremor 5/29/2017    On Remeron    GERD (gastroesophageal reflux disease)     History of colon polyps 3/18/2014     Hypothyroidism due to acquired atrophy of thyroid 2015    Last on .  None since then.    Memory loss last months.    Mixed hyperlipidemia 2017    Obesity     PAF (paroxysmal atrial fibrillation) 2015    Sleep apnea     BiPAP       Past Surgical History:   Past Surgical History:   Procedure Laterality Date    COLONOSCOPY  2000  ([place])    Internal hemorrhoids, otherwise normal exam.    COLONOSCOPY W/ POLYPECTOMY  2010  [place]    One less than 1 mm polyp in the distal sigmoid.  TUBULAR ADENOMA.    One less than 1 mm polyp in the cecum.  TUBULAR ADENOMA.    Non-bleeding internal hemorrhoids.       EYE SURGERY Bilateral     cataracts    UPPER GASTROINTESTINAL ENDOSCOPY  2007  (Dr. Bourgeois)    Grade 1 reflux esophagitis.  Small/medium hiatal hernia.  Pylorus erythema.    UPPER GASTROINTESTINAL ENDOSCOPY  2010  [facility]    Slight reflux esophagitis.  Z-line regular, 30 cm from the incisors.   Moderate / large hiatus hernia.  Normal stomach and duodenum.  SELAM Test  Negative.        Social History:  Social History     Tobacco Use    Smoking status: Former Smoker     Types: Pipe     Last attempt to quit: 2000     Years since quittin.1    Smokeless tobacco: Former User    Tobacco comment: smoked pipe regularly once a day but quit 10-15 yrs ago   Substance Use Topics    Alcohol use: Yes     Frequency: 2-3 times a week     Drinks per session: 3 or 4     Binge frequency: Never     Comment: 2-3 glasses of wine weekly    Drug use: No       Family History:   Family History   Problem Relation Age of Onset    Cancer Father         sarcoma,  of    Colon cancer Neg Hx     Colon polyps Neg Hx     Esophageal cancer Neg Hx     Stomach cancer Neg Hx        Medications:   No current facility-administered medications on file prior to encounter.      Current Outpatient Medications on File Prior to Encounter   Medication Sig Dispense Refill    amoxicillin-clavulanate  875-125mg (AUGMENTIN) 875-125 mg per tablet Take 1 tablet by mouth 2 (two) times daily. 14 tablet 0    atorvastatin (LIPITOR) 40 MG tablet TAKE 1 TABLET EVERY DAY 90 tablet 3    doxazosin (CARDURA) 8 MG Tab doxazosin 8 mg tablet      ELIQUIS 5 mg Tab TAKE 1 TABLET TWICE DAILY 180 tablet 3    finasteride (PROSCAR) 5 mg tablet TAKE 1 TABLET EVERY DAY 90 tablet 3    flecainide (TAMBOCOR) 50 MG Tab TAKE 1 TABLET EVERY 12 HOURS 180 tablet 11    levothyroxine (SYNTHROID) 75 MCG tablet Synthroid 75 mcg tablet      metoprolol tartrate (LOPRESSOR) 25 MG tablet TAKE 1/2 TABLET TWICE DAILY 90 tablet 11    mirtazapine (REMERON) 15 MG tablet TAKE 1 TABLET (15 MG TOTAL) BY MOUTH NIGHTLY. 90 tablet 1    montelukast (SINGULAIR) 10 mg tablet montelukast 10 mg tablet      tamsulosin (FLOMAX) 0.4 mg Cap Take 1 capsule (0.4 mg total) by mouth 2 (two) times daily. 180 capsule 3    acetaminophen (TYLENOL) 500 MG tablet Take 500 mg by mouth every 6 (six) hours as needed for Pain.      calcium carbonate/vitamin D3 (CALTRATE 600 + D ORAL) Take by mouth once daily.       docusate sodium (COLACE) 100 MG capsule Take 100 mg by mouth every evening. Taking Pierce stool softener      fluticasone propionate (FLONASE) 50 mcg/actuation nasal spray 2 sprays (100 mcg total) by Each Nostril route once daily. 3 Bottle 4    lutein-zeaxanthin (OCUVITE LUTEIN 25) 25-5 mg Cap       mupirocin (BACTROBAN) 2 % ointment Apply topically 3 (three) times daily. 15 g 0    omeprazole (PRILOSEC) 40 MG capsule TAKE 1 CAPSULE (40 MG TOTAL) BY MOUTH ONCE DAILY. 90 capsule 3    oxybutynin (DITROPAN-XL) 10 MG 24 hr tablet oxybutynin chloride ER 10 mg tablet,extended release 24 hr      promethazine-dextromethorphan (PROMETHAZINE-DM) 6.25-15 mg/5 mL Syrp Take 5 mLs by mouth nightly as needed. 118 mL 0    spironolactone (ALDACTONE) 25 MG tablet spironolactone 25 mg tablet      [DISCONTINUED] doxycycline (VIBRAMYCIN) 100 MG Cap Take 1 capsule (100 mg  total) by mouth 2 (two) times daily. for 7 days 14 capsule 0       Allergies: Patient has No Known Allergies.      Physical Exam:      Intake/Output Summary (Last 24 hours) at 3/11/2020 2026  Last data filed at 3/11/2020 1916  Gross per 24 hour   Intake 1250 ml   Output --   Net 1250 ml     Wt Readings from Last 3 Encounters:   03/11/20 90.7 kg (200 lb)   03/09/20 90.7 kg (200 lb)   03/07/20 91.2 kg (201 lb 1 oz)       BP (!) 140/69 (BP Location: Left arm, Patient Position: Sitting)   Pulse 77   Temp 99.5 °F (37.5 °C) (Oral)   Resp 20   Wt 90.7 kg (200 lb)   SpO2 95%   BMI 31.32 kg/m²   Physical Exam   Constitutional: He is oriented to person, place, and time.   Elderly man, lying back in bed, ill-appearing  Nasal cannula in place   HENT:   Head: Normocephalic.   Mouth/Throat: Oropharynx is clear and moist. No oropharyngeal exudate.   Eyes: Conjunctivae are normal. Right eye exhibits no discharge. Left eye exhibits no discharge. No scleral icterus.   Neck: Normal range of motion. No JVD present.   Cardiovascular: Normal rate, regular rhythm, normal heart sounds and intact distal pulses. Exam reveals no gallop and no friction rub.   No murmur heard.  Pulmonary/Chest: Effort normal. No stridor. No respiratory distress.   Faint crackles bilaterally   Abdominal: Soft. Bowel sounds are normal. He exhibits no distension. There is no tenderness. There is no guarding.   Musculoskeletal: Normal range of motion. He exhibits no edema.   Strength 5/5 in bilateral elbow flexion and hip flexion   Lymphadenopathy:     He has no cervical adenopathy.   Neurological: He is alert and oriented to person, place, and time. Coordination normal.   Intention tremor present, extinguishes with rest   Skin: Skin is warm. Capillary refill takes less than 2 seconds. No rash noted.       Laboratory:    Recent Labs   Lab 03/09/20  1328 03/11/20  1234   WBC 5.21 4.81   HGB 13.8* 14.1   HCT 42.4 43.8   * 124*     Recent Labs   Lab  "03/09/20  1328 03/11/20  1234   * 134*   K 4.0 3.5    98   CO2 20* 26   BUN 15 14   CREATININE 1.1 1.0    101   CALCIUM 9.1 9.6     Recent Labs   Lab 03/09/20  1328 03/11/20  1234   ALKPHOS 33* 31*   ALT 22 46*   AST 53* 90*   ALBUMIN 3.7 3.9   PROT 6.8 7.7   BILITOT 0.5 0.6      No results for input(s): POCTGLUCOSE in the last 168 hours.  No results for input(s): CPK, CPKMB, MB, TROPONINI in the last 72 hours.    Recent Labs     03/11/20  1234   LACTATE 1.0        Microbiology:  Microbiology Results (last 7 days)     Procedure Component Value Units Date/Time    Blood culture #2 **CANNOT BE ORDERED STAT** [[id number]] Collected:  03/11/20 1337    Order Status:  Sent Specimen:  Blood from Peripheral, Hand, Left Updated:  03/11/20 1356    Blood culture #1 **CANNOT BE ORDERED STAT** [[id number]] Collected:  03/11/20 1235    Order Status:  Sent Specimen:  Blood from Peripheral, Antecubital, Left Updated:  03/11/20 1254            Cardiac:      Results for orders placed during the hospital encounter of 11/12/18   Transthoracic echo (TTE) complete (Cupid Only)    Narrative · Concentric remodeling with LVEF of 60%  · RV size is normal with normal RV systolic function  · Normal diastolic function  · Mild MR with mitral annular calcification  · LAE  · Mild TR         Imaging:    Imaging Results          X-Ray Chest AP Portable (Final result)  Result time 03/11/20 14:05:08    Final result by Noble Baker MD (03/11/20 14:05:08)                 Impression:      No detrimental change when compared with 03/09/2020.      Electronically signed by: Noble Baker MD  Date:    03/11/2020  Time:    14:05             Narrative:    EXAMINATION:  XR CHEST AP PORTABLE    CLINICAL HISTORY:  Provided history is "fever;  ".    TECHNIQUE:  One view of the chest.    COMPARISON:  03/09/2020.    FINDINGS:  Persistent subsegmental atelectatic changes in the right upper lobe and left lung base.  No new focal area of " consolidation.  No sizable pleural effusion.  No pneumothorax.                                  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       Acute hypoxemic respiratory failure  Rhinovirus/enterovirus bronchits  Chest film with right upper lobe and left lower lobe atelectasis, otherwise no overt consolidations.  Given recent possible COVID-19 at his nursing home, high level of concern for this etiology. Most likely this is a viral bronchitis from recent rhinovirus/enterovirus, particularly with his gastroenteritis symptoms.    versus bacterial pneumonia.  Given his interval worsening, it is reasonable to treat for a secondary bacterial pneumonia.  He also has hyponatremia, will test for Legionella.  No hypotension or signs/symptoms of sepsis.  - influenza negative  - RIP 3/9 with rhinovirus/enterovirus  - blood cultures pending  - sputum culture  - Legionella testing  - if hypoxemia worsens, will get CT chest  - status post azithromycin 500 mg IV in the ED, continue 250 mg p.o. Daily (QTc <500)  - continue ceftriaxone 1 g Q 24hr  - incentive spirometer    Under investigation for COVID-19  Sick contact at his nursing home.  Recent international travel within 2 weeks of onset of symptoms, not to a high risk country  - follow up COVID-19 test (sent 3/11)    Viral gastroenteritis  -  loperamide p.r.n.    Paroxysmal atrial fibrillation  Follows with Vaughn Crowder  - continue apixaban 5 mg p.o. B.i.d.  - continue flecainide 50 mg p.o. B.i.d.  - continue metoprolol tartrate 12.5 mg p.o. B.i.d.  - ECG in the a.m. to check QTC given QT prolonging medications    GERD, chronic  - continue home omeprazole (pantoprazole formulary 40 mg p.o. Daily)    BPH, chronic  - continue home tamsulosin 0.4 mg daily  - hold doxazosin, will confirm with pharmacy in the morning, patient is unsure    Hypothyroidism, chronic, POA stable  - continue home levothyroxine 75 mcg p.o. Daily    Dyslipidemia  - continue home atorvastatin 40 mg p.o. Daily    History of stroke  - no residual signs or symptoms  - continue anticoagulation and see statin as above    Weakness  Subjectively weak on admission, but strength is overall normal  - will discuss PT and OT consultation in the a.m.       Checklist:    FEN: cardiac diet  TLD: PIV  VTE PPx: apixaban    Goals of Care: full code  Disposition: to NH pending resolution of hypoxemia, may need post-acute care pending PT and OT evaluations      Simona Crowder M.D., M.P.H.  Department of Hospital Medicine  [facility]  (pager) [phone] (spect) [phone]

## 2020-03-12 NOTE — PLAN OF CARE
CM met with patient for discharge planning.  Patient states he lives with his wife in a 5th floor apartment at [facility] with an elevator.  Patient is independent normally with ADL's.  Plan is to discharge home with home health.    Pharmacy:    [facility] - [place], [place] - [address]  [address] [address]  Phone: [phone] Fax: [phone]    [id number] [place], [place] - [address] [facility]  [address]  [facility] [address]  Phone: [phone] Fax: [phone]    PCP:  Kemi Lopez MD    Payor: MEDICARE / Plan: MEDICARE PART A & B / Product Type: [facility] /      03/12/20 1039   Discharge Assessment   Assessment Type Discharge Planning Assessment   Confirmed/corrected address and phone number on facesheet? Yes   Assessment information obtained from? Patient   Expected Length of Stay (days) 3   Communicated expected length of stay with patient/caregiver yes   Prior to hospitilization cognitive status: Alert/Oriented   Prior to hospitalization functional status: Independent   Current cognitive status: Alert/Oriented   Current Functional Status: Independent   Facility Arrived From: From [facility] to Emergency room   Lives With spouse   Able to Return to Prior Arrangements yes   Is patient able to care for self after discharge? Unable to determine at this time (comments)   Patient's perception of discharge disposition home or selfcare   Readmission Within the Last 30 Days no previous admission in last 30 days   Patient currently being followed by outpatient case management? No   Patient currently receives any other outside agency services? No   Equipment Currently Used at Home none   Do you have any problems affording any of your prescribed medications? No   Is the patient taking medications as prescribed? yes   Does the patient have transportation home? Yes   Transportation Anticipated family or friend  will provide   Does the patient receive services at the [facility]? No   Discharge Plan A Home Health   Discharge Plan B Skilled Nursing Facility   DME Needed Upon Discharge  none   Patient/Family in Agreement with Plan yes

## 2020-03-12 NOTE — PROGRESS NOTES
Hospital Medicine  Progress Note  [facility]      Patient Name: Taran Crowell  MRN:  [id number]  Hospital Medicine [place] [facility] [place] [facility] [place] Simona Crowder MD  Date of Admission:  3/11/2020     Expected Discharge Date: 3/14/2020  Length of Stay:  LOS: 1 day     Code Status: FULL CODE    Principal Problem:  Acute hypoxemic respiratory failure      History of Present Illness:  Mr. Taran Crowell is a 80 y.o. 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     He reports onset of symptoms approximately 2 weeks ago, when he began to develop noticeable weakness, severe progressive fatigue, a cough productive of clear sputum, decreased appetite, and overall malaise.  Over the last 2 days, he has developed intermittent fevers and chills, with home temperature reportedly 103 F. He is so weak he can barely walk, he denies residual deficits from his stroke but did say his initial symptoms were ataxia. Endorses nausea and diarrhea (<4 BM/day) for the last 2 weeks, as well as decreased appetite. No URI symptoms of rhinorrhea, sore throat, eye discharge, ear fullness, post-nasal drip.      He was seen in the Emergency Department on 03/09 where he was positive for human rhinovirus.  He was prescribed Augmentin and reports taking this as prescribed.      Of note, he resides at [facility] which has a probable COVID-19 case.  He endorses recent travel 3 weeks ago on a [place] cruise around the [facility].  His wife is also ill with mild cold-like symptoms.     In the emergency department, he was initially hypoxemic on ambient air, temp 99.5, and mildly tachypneic with respiratory rate of 20.  His hypoxemia improved with 2 L of supplemental oxygen.  He was given 1 L of saline and ceftriaxone, azithromycin. COVID-19 testing was  sent.      Hospital Course:    Interval History:     Admitted overnight, breathing feels better today, appetite improved, weakness is better. No acute events overnight, afebrile, oxygenating >95% on 2L in the morning. Cough is productive. Sitting up in the chair on my evaluation, off supplemental oxygen. Labs with improved LFTs    Review of Systems:  Review of Systems   Constitutional: Positive for activity change and fatigue. Negative for chills and fever.   HENT: Negative for rhinorrhea and sinus pressure.    Eyes: Negative for pain, discharge and redness.   Respiratory: Positive for cough (productive) and shortness of breath (improved). Negative for choking, chest tightness, wheezing and stridor.    Cardiovascular: Negative for chest pain and palpitations.   Gastrointestinal: Positive for diarrhea and nausea. Negative for abdominal distention, abdominal pain and vomiting.   Endocrine: Negative for cold intolerance and heat intolerance.   Genitourinary: Positive for difficulty urinating (Chronic). Negative for frequency.   Musculoskeletal: Negative for arthralgias, back pain and myalgias.   Neurological: Positive for weakness (Generalized, improved). Negative for dizziness.   Hematological: Negative for adenopathy.   Psychiatric/Behavioral: Negative for confusion and decreased concentration.         Inpatient Medications:    Current Facility-Administered Medications:     acetaminophen tablet 650 mg, 650 mg, Oral, Q4H PRN, Simona Crowder MD, 650 mg at 03/12/20 1159    apixaban tablet 5 mg, 5 mg, Oral, BID, Simona Crowder MD, 5 mg at 03/12/20 1159    atorvastatin tablet 40 mg, 40 mg, Oral, Daily, Simona Crowder MD, 40 mg at 03/12/20 1200    azithromycin tablet 250 mg, 250 mg, Oral, Daily, Simona Crowder MD, 250 mg at 03/12/20 1200    cefTRIAXone injection 1 g, 1 g, Intravenous, Q24H, Simona Crowder MD, 1 g at 03/12/20 1600    dextrose 10% (D10W) Bolus, 12.5 g, Intravenous, PRN, Simona LOREDO  "MD Vel    dextrose 10% (D10W) Bolus, 25 g, Intravenous, PRN, Simona Crowder MD    finasteride tablet 5 mg, 5 mg, Oral, Daily, Simona Crowder MD, 5 mg at 03/12/20 1159    flecainide tablet 50 mg, 50 mg, Oral, Q12H, Simona Crowder MD, 50 mg at 03/12/20 1159    glucagon (human recombinant) injection 1 mg, 1 mg, Intramuscular, PRN, Simona Crowder MD    glucose chewable tablet 16 g, 16 g, Oral, PRN, Simona Crowder MD    glucose chewable tablet 24 g, 24 g, Oral, PRN, Simona Crowder MD    levothyroxine tablet 75 mcg, 75 mcg, Oral, Before breakfast, Simona Crowder MD, 75 mcg at 03/12/20 0604    metoprolol tartrate (LOPRESSOR) split tablet 12.5 mg, 12.5 mg, Oral, BID, Simona Crowder MD, 12.5 mg at 03/12/20 1200    mirtazapine tablet 15 mg, 15 mg, Oral, Nightly, Simona Crowder MD, 15 mg at 03/11/20 2323    ondansetron disintegrating tablet 8 mg, 8 mg, Oral, Q8H PRN, Simona Crowder MD    pantoprazole EC tablet 40 mg, 40 mg, Oral, Daily, Simona Crowder MD, 40 mg at 03/12/20 1159    sodium chloride 0.9% flush 10 mL, 10 mL, Intravenous, PRN, Zoya Lu PA-C    sodium chloride 0.9% flush 10 mL, 10 mL, Intravenous, PRN, Simona Crowder MD    spironolactone tablet 25 mg, 25 mg, Oral, Daily, Simona Crowder MD, 25 mg at 03/12/20 1200    tamsulosin 24 hr capsule 0.4 mg, 0.4 mg, Oral, Daily, Simona Crowder MD, 0.4 mg at 03/12/20 1200      Physical Exam:      Intake/Output Summary (Last 24 hours) at 3/12/2020 1805  Last data filed at 3/12/2020 1100  Gross per 24 hour   Intake 730 ml   Output --   Net 730 ml     Wt Readings from Last 3 Encounters:   03/11/20 91.5 kg (201 lb 11.5 oz)   03/09/20 90.7 kg (200 lb)   03/07/20 91.2 kg (201 lb 1 oz)       /71 (BP Location: Left arm, Patient Position: Sitting)   Pulse 68   Temp 98.2 °F (36.8 °C) (Oral)   Resp 18   Ht 5' 9" (1.753 m)   Wt 91.5 kg (201 lb 11.5 oz)   SpO2 95%   BMI 29.79 kg/m²   Physical " Exam   Constitutional: He is oriented to person, place, and time.   Sitting upright in the chair next to the bed, off NC, appears much improved from prior   HENT:   Mouth/Throat: Oropharynx is clear and moist. No oropharyngeal exudate.   Eyes: Conjunctivae are normal. No scleral icterus.   Neck: No JVD present.   Cardiovascular: Normal rate, regular rhythm, normal heart sounds and intact distal pulses. Exam reveals no gallop and no friction rub.   No murmur heard.  Pulmonary/Chest: Effort normal and breath sounds normal. No stridor. No respiratory distress.   Faint crackles bilaterally   Abdominal: Soft. Bowel sounds are normal. He exhibits no distension. There is no tenderness. There is no guarding.   Musculoskeletal: Normal range of motion. He exhibits no edema.   Neurological: He is alert and oriented to person, place, and time.   Skin: Skin is warm. Capillary refill takes less than 2 seconds. No rash noted.       Laboratory:    Recent Labs   Lab 03/09/20  1328 03/11/20  1234 03/12/20  0732   WBC 5.21 4.81 5.33   HGB 13.8* 14.1 12.4*   HCT 42.4 43.8 38.3*   * 124* 106*     Recent Labs   Lab 03/09/20  1328 03/11/20  1234 03/12/20  0732   * 134* 134*   K 4.0 3.5 3.8    98 102   CO2 20* 26 21*   BUN 15 14 10   CREATININE 1.1 1.0 0.8    101 90   CALCIUM 9.1 9.6 8.2*   MG  --   --  1.8   PHOS  --   --  3.0     Recent Labs   Lab 03/09/20  1328 03/11/20  1234 03/12/20  0732   ALKPHOS 33* 31* 24*   ALT 22 46* 43   AST 53* 90* 84*   ALBUMIN 3.7 3.9 3.0*   PROT 6.8 7.7 6.1   BILITOT 0.5 0.6 0.4      No results for input(s): POCTGLUCOSE in the last 168 hours.  No results for input(s): CPK, CPKMB, MB, TROPONINI in the last 72 hours.    Recent Labs     03/11/20  1234   LACTATE 1.0        No results for input(s): POCTGLUCOSE in the last 168 hours.  Lab Results   Component Value Date    HGBA1C 5.4 01/15/2020         Microbiology:  Microbiology Results (last 7 days)     Procedure Component Value Units  "Date/Time    Blood culture #1 **CANNOT BE ORDERED STAT** [[id number]] Collected:  03/11/20 1235    Order Status:  Completed Specimen:  Blood from Peripheral, Antecubital, Left Updated:  03/12/20 1412     Blood Culture, Routine No Growth to date      No Growth to date    Culture, Respiratory with Gram Stain [[id number]] Collected:  03/11/20 2147    Order Status:  Completed Specimen:  Respiratory from Sputum, Expectorated Updated:  03/12/20 0338     Gram Stain (Respiratory) <10 epithelial cells per low power field.     Gram Stain (Respiratory) Rare WBC's     Gram Stain (Respiratory) Rare Gram positive cocci     Gram Stain (Respiratory) Rare Gram negative rods     Gram Stain (Respiratory) Rare Gram positive rods    Blood culture #2 **CANNOT BE ORDERED STAT** [[id number]] Collected:  03/11/20 1337    Order Status:  Completed Specimen:  Blood from Peripheral, Hand, Left Updated:  03/12/20 0115     Blood Culture, Routine No Growth to date            Imaging  Results for orders placed during the hospital encounter of 11/12/18   Transthoracic echo (TTE) complete (Cupid Only)    Narrative · Concentric remodeling with LVEF of 60%  · RV size is normal with normal RV systolic function  · Normal diastolic function  · Mild MR with mitral annular calcification  · LAE  · Mild TR           X-Ray Chest AP Portable  Narrative: EXAMINATION:  XR CHEST AP PORTABLE    CLINICAL HISTORY:  Provided history is "fever;  ".    TECHNIQUE:  One view of the chest.    COMPARISON:  03/09/2020.    FINDINGS:  Persistent subsegmental atelectatic changes in the right upper lobe and left lung base.  No new focal area of consolidation.  No sizable pleural effusion.  No pneumothorax.  Impression: No detrimental change when compared with 03/09/2020.    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           Acute hypoxemic respiratory failure  Rhinovirus/enterovirus bronchits  Chest film with right upper lobe and left lower lobe atelectasis, otherwise no overt consolidations.  Given recent possible COVID-19 at his nursing home, high level of concern for this etiology. Most likely this is a viral bronchitis from recent rhinovirus/enterovirus, particularly with his gastroenteritis symptoms.    versus bacterial pneumonia.  Given his interval worsening, it is reasonable to treat for a secondary bacterial pneumonia.  He also has hyponatremia, will test for Legionella.  No hypotension or signs/symptoms of sepsis.  - influenza negative  - RIP 3/9 with rhinovirus/enterovirus  - blood cultures NGTD  - sputum culture: <10 epi cells, rare WBCs, polymicrobial  - Legionella testing pending  - status post azithromycin 500 mg IV in the ED, continue 250 mg p.o. daily (QTc <500)  - continue ceftriaxone 1 g Q 24hr  - incentive spirometer    Person under investigation for COVID-19  Presumptive case at his nursing home.  Recent international travel within 2 weeks of onset of symptoms, not to a high risk country  - [facility] notified  - COVID-19 testing sent 3/11  - Lists of hospitals in the [place] #[id number]  - Infection Control notified  - Airborne and Droplet Precautions  - Limit all visitors  - Coronavirus notes   - Supplemental O2 to maintain SpO2 >92%   - avoid BiPAP for respiratory failure due to  aerosolization -> intubate     - avoid steroids unless septic shock due to increased duration of viral replication   - gentle fluid resuscitation due to worsening of hypoxemia/ARDS        Viral gastroenteritis, poa, resolved  - loperamide p.r.n.     Paroxysmal atrial fibrillation  Follows with Vaughn Crowder  - continue apixaban 5 mg p.o. B.i.d.  - continue flecainide 50 mg p.o. B.i.d.  - continue metoprolol tartrate 12.5 mg p.o. B.i.d.  - ECG 3/12 with QTc 423     GERD, chronic  - continue home omeprazole (pantoprazole formulary 40 mg p.o. Daily)     BPH, chronic  - continue home tamsulosin 0.4 mg daily  - hold doxazosin, will confirm with pharmacy in the morning, patient is unsure     Hypothyroidism, chronic, POA stable  - continue home levothyroxine 75 mcg p.o. Daily     Dyslipidemia  - continue home atorvastatin 40 mg p.o. Daily     History of stroke  - no residual signs or symptoms  - continue anticoagulation and see statin as above     Weakness  Subjectively weak on admission, but overall improving.   - will discuss PT and OT consultation in the a.m. If he doesn't continue to improve        Checklist:    FEN: cardiac diet  TLD: PIV  VTE PPx: apixaban    Goals of Care: full code  Disposition: to NH pending resolution of hypoxemia, may need post-acute care pending PT and OT evaluations        Simona Crowder M.D., M.P.H.  Department of Hospital Medicine  [facility]  (pager) [phone] (spect) [phone]

## 2020-03-12 NOTE — PLAN OF CARE
Problem: Fall Injury Risk  Goal: Absence of Fall and Fall-Related Injury  Outcome: Ongoing, Progressing     Problem: Adult Inpatient Plan of Care  Goal: Plan of Care Review  Outcome: Ongoing, Progressing  Flowsheets (Taken 3/12/2020 1818)  Plan of Care Reviewed With: patient  Goal: Patient-Specific Goal (Individualization)  Outcome: Ongoing, Progressing  Flowsheets (Taken 3/12/2020 1818)  Individualized Care Needs: none  Anxieties, Fears or Concerns: none  Patient-Specific Goals (Include Timeframe): To go home ASAP.  Goal: Absence of Hospital-Acquired Illness or Injury  Outcome: Ongoing, Progressing  Goal: Optimal Comfort and Wellbeing  Outcome: Ongoing, Progressing  Goal: Readiness for Transition of Care  Outcome: Ongoing, Progressing  Goal: Rounds/Family Conference  Outcome: Ongoing, Progressing     Aox4; VSS; Safety maintained; No complaints of pain; Temp controlled with PRNs; Will continue to monitor

## 2020-03-13 PROBLEM — E03.9 HYPOTHYROIDISM: Status: ACTIVE | Noted: 2020-01-01

## 2020-03-13 NOTE — PLAN OF CARE
"Reviewed Plan of Care with patient. Patient verbalized understanding.  Patient has been sitting up in chair this shift. Patient is receiving 2L/NC.   No c/o CP or SOB.  Patient is very anxious about "going home".  Patient was started on IVPB antibiotics. AFrebrile.  VSS.  Call bell in reach.  Patient on contact precaution, Airborne and droplet.  Safety measures in place.  No c/o fallls or injuries. Will continue to monitor patient's care.    "

## 2020-03-13 NOTE — PLAN OF CARE
Problem: Fall Injury Risk  Goal: Absence of Fall and Fall-Related Injury  Outcome: Ongoing, Progressing     Problem: Adult Inpatient Plan of Care  Goal: Plan of Care Review  Outcome: Ongoing, Progressing  Goal: Patient-Specific Goal (Individualization)  Outcome: Ongoing, Progressing  Goal: Absence of Hospital-Acquired Illness or Injury  Outcome: Ongoing, Progressing  Goal: Optimal Comfort and Wellbeing  Outcome: Ongoing, Progressing  Goal: Readiness for Transition of Care  Outcome: Ongoing, Progressing  Goal: Rounds/Family Conference  Outcome: Ongoing, Progressing   Patient rested in bed this shift. Patient temp WnL at this time. No acute distress noted. Patient remains on isolation precautions. Will monitor. CB near.

## 2020-03-13 NOTE — TELEPHONE ENCOUNTER
Spoke to pt's wife who states [facility] called her to tell her that pt was COVID 19+.  Pt herself is in isolation at [facility].  Advised pt to leave her number with [facility] inpatient nurse for Hospitalist to call her with an update when possible and that this tends to occur after morning rounds.

## 2020-03-13 NOTE — PROGRESS NOTES
Hospital Medicine  Progress Note  [facility]      Patient Name: Taran Crowell  MRN:  [id number]  Hospital Medicine Team: [facility] HOSP MED V Simona Crowder MD  Date of Admission:  3/11/2020     Expected Discharge Date: 3/14/2020  Length of Stay:  LOS: 2 days     Code Status: FULL CODE    Principal Problem:  Acute hypoxemic respiratory failure      History of Present Illness:  Mr. Taran Crowell is a 80 y.o. 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     He reports onset of symptoms approximately 2 weeks ago, when he began to develop noticeable weakness, severe progressive fatigue, a cough productive of clear sputum, decreased appetite, and overall malaise.  Over the last 2 days, he has developed intermittent fevers and chills, with home temperature reportedly 103 F. He is so weak he can barely walk, he denies residual deficits from his stroke but did say his initial symptoms were ataxia. Endorses nausea and diarrhea (<4 BM/day) for the last 2 weeks, as well as decreased appetite. No URI symptoms of rhinorrhea, sore throat, eye discharge, ear fullness, post-nasal drip.      He was seen in the Emergency Department on 03/09 where he was positive for human rhinovirus.  He was prescribed Augmentin and reports taking this as prescribed.      Of note, he resides at [facility] which has a probable COVID-19 case.  He endorses recent travel 3 weeks ago on a [place] cruise around the [facility].  His wife is also ill with mild cold-like symptoms.     In the emergency department, he was initially hypoxemic on ambient air, temp 99.5, and mildly tachypneic with respiratory rate of 20.  His hypoxemia improved with 2 L of supplemental oxygen.  He was given 1 L of saline and ceftriaxone, azithromycin. COVID-19 testing was  sent.      Hospital Course:  Mr. Crowell was admitted to [facility] Medicine for management of acute hypoxemic respiratory failure and febrile illness in the setting of rhino virus positive RIP    Interval History:     Admitted overnight, breathing feels better today, appetite improved, weakness is better. No acute events overnight, afebrile, oxygenating >95% on 2L in the morning. Cough is productive. Sitting up in the chair on my evaluation, off supplemental oxygen. Labs with improved LFTs    * Note I evaluated this patient approximately 30 minutes prior to decline. On my eval, he is sitting up in the chair, conversing easily with me. Bilateral breath sounds were present and he had no overt wheezes or crackles.     Review of Systems:  Review of Systems   Constitutional: Positive for appetite change (better) and fatigue. Negative for activity change, chills and fever.   HENT: Negative for rhinorrhea and sinus pressure.    Eyes: Negative for pain, discharge and redness.   Respiratory: Positive for cough (productive) and shortness of breath (improved). Negative for choking, chest tightness, wheezing and stridor.    Cardiovascular: Negative for chest pain and palpitations.   Gastrointestinal: Negative for abdominal distention, abdominal pain, diarrhea, nausea and vomiting.   Endocrine: Negative for cold intolerance and heat intolerance.   Genitourinary: Positive for difficulty urinating (Chronic). Negative for frequency.   Musculoskeletal: Negative for arthralgias, back pain and myalgias.   Neurological: Positive for weakness (Generalized, improved). Negative for dizziness.   Psychiatric/Behavioral: Negative for confusion and decreased concentration.         Inpatient Medications:    Current Facility-Administered Medications:     acetaminophen tablet 650 mg, 650 mg, Oral, Q4H PRN, Simona Crowder MD, 650 mg at 03/13/20 0019    apixaban tablet 5 mg, 5 mg, Oral, BID, Simona Crowder MD, 5 mg at 03/12/20 5012     atorvastatin tablet 40 mg, 40 mg, Oral, Daily, Simona Crowder MD, 40 mg at 03/12/20 1200    azithromycin tablet 250 mg, 250 mg, Oral, Daily, Simona Crowder MD, 250 mg at 03/12/20 1200    cefTRIAXone injection 1 g, 1 g, Intravenous, Q24H, Simona Crowder MD, 1 g at 03/12/20 1600    dextrose 10% (D10W) Bolus, 12.5 g, Intravenous, PRN, Simona Crowder MD    dextrose 10% (D10W) Bolus, 25 g, Intravenous, PRN, Simona Crowder MD    finasteride tablet 5 mg, 5 mg, Oral, Daily, Simona Crowder MD, 5 mg at 03/12/20 1159    flecainide tablet 50 mg, 50 mg, Oral, Q12H, Simona Crowder MD, 50 mg at 03/12/20 2103    glucagon (human recombinant) injection 1 mg, 1 mg, Intramuscular, PRN, Simona Crowder MD    glucose chewable tablet 16 g, 16 g, Oral, PRN, Simona Crowder MD    glucose chewable tablet 24 g, 24 g, Oral, PRN, Simona Crowder MD    levothyroxine tablet 75 mcg, 75 mcg, Oral, Before breakfast, Simona Crowder MD, 75 mcg at 03/13/20 0610    metoprolol tartrate (LOPRESSOR) split tablet 12.5 mg, 12.5 mg, Oral, BID, Simona Crowder MD, 12.5 mg at 03/12/20 2103    mirtazapine tablet 15 mg, 15 mg, Oral, Nightly, Simona Crowder MD, 15 mg at 03/12/20 2103    ondansetron disintegrating tablet 8 mg, 8 mg, Oral, Q8H PRN, Simona Crowder MD    pantoprazole EC tablet 40 mg, 40 mg, Oral, Daily, Simona Crowder MD, 40 mg at 03/12/20 1159    sodium chloride 0.9% flush 10 mL, 10 mL, Intravenous, PRN, Zoya Lu PA-C    sodium chloride 0.9% flush 10 mL, 10 mL, Intravenous, PRN, Simona Crowder MD    spironolactone tablet 25 mg, 25 mg, Oral, Daily, Simona Crowder MD, 25 mg at 03/12/20 1200    tamsulosin 24 hr capsule 0.4 mg, 0.4 mg, Oral, Daily, Simona Crowder MD, 0.4 mg at 03/12/20 1200      Physical Exam:      Intake/Output Summary (Last 24 hours) at 3/13/2020 0834  Last data filed at 3/12/2020 1800  Gross per 24 hour   Intake 480 ml   Output --   Net  "480 ml     Wt Readings from Last 3 Encounters:   03/11/20 91.5 kg (201 lb 11.5 oz)   03/09/20 90.7 kg (200 lb)   03/07/20 91.2 kg (201 lb 1 oz)       BP (!) 147/70 (BP Location: Left arm, Patient Position: Lying)   Pulse 64   Temp 98.3 °F (36.8 °C) (Oral)   Resp 17   Ht 5' 9" (1.753 m)   Wt 91.5 kg (201 lb 11.5 oz)   SpO2 96%   BMI 29.79 kg/m²   Physical Exam   Constitutional: He is oriented to person, place, and time.   Elderly man, sitting up in chair at the bedside, conversant and interactive, no distress   Eyes: Conjunctivae are normal. No scleral icterus.   Neck: No JVD present.   Cardiovascular: Normal rate, regular rhythm, normal heart sounds and intact distal pulses. Exam reveals no gallop and no friction rub.   No murmur heard.  Pulmonary/Chest: Effort normal and breath sounds normal. No stridor. No respiratory distress.   Faint crackles bilaterally   Abdominal: Soft. Bowel sounds are normal. He exhibits no distension. There is no tenderness. There is no guarding.   Musculoskeletal: Normal range of motion. He exhibits no edema.   Neurological: He is alert and oriented to person, place, and time.   Skin: Skin is warm. Capillary refill takes less than 2 seconds. No rash noted.        Laboratory:    Recent Labs   Lab 03/09/20  1328 03/11/20  1234 03/12/20  0732   WBC 5.21 4.81 5.33   HGB 13.8* 14.1 12.4*   HCT 42.4 43.8 38.3*   * 124* 106*     Recent Labs   Lab 03/09/20  1328 03/11/20  1234 03/12/20  0732   * 134* 134*   K 4.0 3.5 3.8    98 102   CO2 20* 26 21*   BUN 15 14 10   CREATININE 1.1 1.0 0.8    101 90   CALCIUM 9.1 9.6 8.2*   MG  --   --  1.8   PHOS  --   --  3.0     Recent Labs   Lab 03/09/20  1328 03/11/20  1234 03/12/20  0732   ALKPHOS 33* 31* 24*   ALT 22 46* 43   AST 53* 90* 84*   ALBUMIN 3.7 3.9 3.0*   PROT 6.8 7.7 6.1   BILITOT 0.5 0.6 0.4      No results for input(s): POCTGLUCOSE in the last 168 hours.  No results for input(s): CPK, CPKMB, MB, TROPONINI in the " "last 72 hours.    Recent Labs     03/11/20  1234   LACTATE 1.0        No results for input(s): POCTGLUCOSE in the last 168 hours.  Lab Results   Component Value Date    HGBA1C 5.4 01/15/2020         Microbiology:  Microbiology Results (last 7 days)     Procedure Component Value Units Date/Time    Blood culture #2 **CANNOT BE ORDERED STAT** [[id number]] Collected:  03/11/20 1337    Order Status:  Completed Specimen:  Blood from Peripheral, Hand, Left Updated:  03/12/20 1812     Blood Culture, Routine No Growth to date      No Growth to date    Blood culture #1 **CANNOT BE ORDERED STAT** [[id number]] Collected:  03/11/20 1235    Order Status:  Completed Specimen:  Blood from Peripheral, Antecubital, Left Updated:  03/12/20 1412     Blood Culture, Routine No Growth to date      No Growth to date    Culture, Respiratory with Gram Stain [[id number]] Collected:  03/11/20 2147    Order Status:  Completed Specimen:  Respiratory from Sputum, Expectorated Updated:  03/12/20 0338     Gram Stain (Respiratory) <10 epithelial cells per low power field.     Gram Stain (Respiratory) Rare WBC's     Gram Stain (Respiratory) Rare Gram positive cocci     Gram Stain (Respiratory) Rare Gram negative rods     Gram Stain (Respiratory) Rare Gram positive rods            Imaging  Results for orders placed during the hospital encounter of 11/12/18   Transthoracic echo (TTE) complete (Cupid Only)    Narrative · Concentric remodeling with LVEF of 60%  · RV size is normal with normal RV systolic function  · Normal diastolic function  · Mild MR with mitral annular calcification  · LAE  · Mild TR           X-Ray Chest AP Portable  Narrative: EXAMINATION:  XR CHEST AP PORTABLE    CLINICAL HISTORY:  Provided history is "fever;  ".    TECHNIQUE:  One view of the chest.    COMPARISON:  03/09/2020.    FINDINGS:  Persistent subsegmental atelectatic changes in the right upper lobe and left lung base.  No new focal area of consolidation.  No sizable pleural " effusion.  No pneumothorax.  Impression: No detrimental change when compared with 03/09/2020.    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           Acute hypoxemic respiratory failure  Rhinovirus/enterovirus bronchits  Chest film with right upper lobe and left lower lobe atelectasis, otherwise no overt consolidations.  Given recent possible COVID-19 at his nursing home, high level of concern for this etiology. Most likely this is a viral bronchitis from recent rhinovirus/enterovirus, particularly with his gastroenteritis symptoms.    versus bacterial pneumonia.  Given his interval worsening, it is reasonable to treat for a secondary bacterial pneumonia.  He also has hyponatremia, will test for Legionella.  No hypotension or signs/symptoms of sepsis.  - influenza negative  - RIP 3/9 with rhinovirus/enterovirus  - blood cultures NGTD  - sputum culture: <10 epi cells, rare WBCs, polymicrobial  - Legionella testing pending  - status post azithromycin 500 mg IV in the ED, continue 250 mg p.o. daily (QTc <500)  - continue ceftriaxone 1 g Q 24hr  - incentive spirometer    Person under investigation for COVID-19  Presumptive case at his nursing home.  Recent international travel within 2 weeks of onset of  symptoms, not to a high risk country  - [facility] notified  - COVID-19 testing sent 3/11  - PUI #[id number]  - Infection Control notified  - Airborne and Droplet Precautions  - Limit all visitors  - Coronavirus notes   - Supplemental O2 to maintain SpO2 >92%   - avoid BiPAP for respiratory failure due to aerosolization -> intubate     - avoid steroids unless septic shock due to increased duration of viral replication   - gentle fluid resuscitation due to worsening of hypoxemia/ARDS        Viral gastroenteritis, poa, resolved  - loperamide p.r.n.     Paroxysmal atrial fibrillation  Follows with Vaughn Crowder  - continue apixaban 5 mg p.o. B.i.d.  - continue flecainide 50 mg p.o. B.i.d.  - continue metoprolol tartrate 12.5 mg p.o. B.i.d.  - ECG 3/12 with QTc 423     GERD, chronic  - continue home omeprazole (pantoprazole formulary 40 mg p.o. Daily)     BPH, chronic  - continue home tamsulosin 0.4 mg daily  - hold doxazosin, will confirm with pharmacy in the morning, patient is unsure     Hypothyroidism, chronic, POA stable  - continue home levothyroxine 75 mcg p.o. Daily     Dyslipidemia  - continue home atorvastatin 40 mg p.o. Daily     History of stroke  - no residual signs or symptoms  - continue anticoagulation and see statin as above     Weakness  Subjectively weak on admission, but overall improving.   - will discuss PT and OT consultation in the a.m. If he doesn't continue to improve        Checklist:    FEN: cardiac diet  TLD: PIV  VTE PPx: apixaban    Goals of Care: full code  Disposition:  patient escalated to MICU due to abrupt respiratory failure with hypoxemia to 75%        Simona Crowder M.D., M.P.H.  Department of Hospital Medicine  [facility]  (pager) [phone] (spect) [phone]

## 2020-03-13 NOTE — PLAN OF CARE
[facility] notified [facility] hospital medicine that this patient has tested negative for COVID-19.

## 2020-03-14 PROBLEM — R06.00 DYSPNEA: Status: ACTIVE | Noted: 2020-01-01

## 2020-03-14 PROBLEM — Z51.5 PALLIATIVE CARE BY SPECIALIST: Status: ACTIVE | Noted: 2020-01-01

## 2020-03-14 NOTE — PLAN OF CARE
CMICU DAILY GOALS       A: Awake    RASS: Goal -  0  Actual -  0   Restraint necessity:  NA  B: Breath   SBT: NA   C: Coordinate A & B, analgesics/sedatives   Pain: NA     SAT: Not intubated  D: Delirium   CAM-ICU: Overall CAM-ICU: Negative  E: Early Mobility   MOVE Screen: NA    Activity: Activity Management: bedrest maintained per order  FAS: Feeding/Nutrition   Diet order: Diet/Nutrition Received: 2 gram sodium, low saturated fat/low cholesterol,   Fluid restriction:    T: Thrombus   DVT prophylaxis: VTE Required Core Measure: Pharmacological prophylaxis initiated/maintained  H: HOB Elevation   Head of Bed (HOB): HOB at 30-45 degrees  U: Ulcer Prophylaxis   GI: yes  G: Glucose control   NA     S: Skin   Bundle compliance: yes     Date: PRN  B: Bowel Function   no issues   I: Indwelling Catheters   [place]una necessity:     CVC necessity: No   IPAD offered: Not appropriate  D: De-escalation Antibx   No  Plan for the day   Monitor respi status  Family/Goals of care/Code Status   Code Status: Full Code     No acute events throughout day, VS and assessment per flow sheet, patient progressing towards goals as tolerated, plan of care reviewed with Taran Crowell and family, all concerns addressed, will continue to monitor.

## 2020-03-14 NOTE — EICU
Rounding (Video Assessment):  Yes    Intervention Initiated From:  COR / EICU    Anthony Communicated with Bedside Nurse regarding:  Respiratory    Nurse Notified:  No    Doctor Notified:  No    Comments: Vitals stable.  No distress

## 2020-03-14 NOTE — HPI
Mr. Taran Crowell is a 80 y.o. 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 Patient resides at [facility] which has confirmed COVID -19 case. Patient has recent travel history which about 3 weeks ago patient went on a [place] cruise around the [facility]. Patient's wife is also ill with mild cold like symptoms.      History mostly collected from the medical chart. Patient reports onset of symptoms approximately 2 weeks ago he began to develop weakness, severe progressive fatigue, a cough productive of clear sputum, decreased appetite, and overall malaise.  Over the last 2 days, he has developed intermittent fevers and chills, with home temperature reportedly 103 F. He became so weak that he can barely walk, he denies residual deficits from his stroke but did say his initial symptoms were ataxia. Endorses nausea and diarrhea (<4 BM/day) for the last 2 weeks, as well as decreased appetite. Patient denied rhinorrhea, sore throat, eye discharge, ear ache and post-nasal drip. Patient had an ED visit on 03/09 where he was positive for human rhinovirus.  He was prescribed Augmentin and reports taking this as prescribed.      In the emergency department, he was initially hypoxemic on ambient air, temp 99.5, and mildly tachypneic with respiratory rate of 20.  His hypoxemia improved with 2 L of supplemental oxygen.  He was given 1 L of saline and ceftriaxone, azithromycin. Patient was admitted to hospital medicine team and COVID-19 testing confirmed positive. On 3/13 evening patient ambulated to go to bathroom and had a minor fall which increased his oxygen requirement to 10LNC and saturation dip below 90%. Patient also has fever of 102 and transfer to MICU.

## 2020-03-14 NOTE — PROGRESS NOTES
Pharmacokinetic Initial Assessment: IV Vancomycin    Assessment/Plan:  - Vancomycin 2000 mg IV x1 loading dose, then 1250 mg IV q12h.  - Trough on 3/16 @0830 prior to 5th total dose, goal 15-20 ug/mL.    Pharmacy will continue to follow and monitor vancomycin.      Please contact pharmacy at extension [phone] with any questions regarding this assessment.     Thank you for the consult,   Claudio Espino     Patient brief summary:  Taran Crowell is a 80 y.o. male initiated on antimicrobial therapy with IV Vancomycin for treatment of suspected lower respiratory infection    Drug Allergies:   Review of patient's allergies indicates:  No Known Allergies    Actual Body Weight:   91.5 kg    Renal Function:   Estimated Creatinine Clearance: 82.3 mL/min (based on SCr of 0.8 mg/dL).,     Dialysis Method (if applicable):  N/A

## 2020-03-14 NOTE — CONSULTS
Palliative Consult Brief note:     Discussed with Dr. Mcintosh, pt, family, and nursing.     Full consult note to follow.     Plan:   Change code status to DNR in accordance to patient's values and previous declared end of life preferences.     Cont best supportive care available    Cont open communication with family based on patient status    In the event of clinical deterioration, the family and patient would want to avoid inappropriate prolongation of the dying process and aggressive symptom management.     Will remain available. Please call with questions.     Papa Marley MD  Palliative Medicine  [phone]

## 2020-03-14 NOTE — ASSESSMENT & PLAN NOTE
Most likely due to Viral vs bacterial infection   - Tylenol 650mg PRN for fever greater than 101   - Blood culture 3/13 drawn

## 2020-03-14 NOTE — ASSESSMENT & PLAN NOTE
On going supportive care  Consider bedside fan; cooler room temps  Low dose morphine for dyspnea refractory to other treatment or if significantly declining

## 2020-03-14 NOTE — ASSESSMENT & PLAN NOTE
with Rhino virus positive and COVID -19 virus confirmed positive admitted to ICU service for acute hypoxemic respiratory failure requiring increase oxygen requirement. Patient is a resident of [facility] where there were positive cases of COVID-19 virus. Patient had fever of 102F and oxygen requiring 10L nasal canula in ICU service for close monitoring. Initial CXR showed RUL and LLL atelectasis seen. Most likely cause is viral bronchitis from COVID-19 and Rhinovirus versus bacterial PNA. Patient flu negative, blood culture NGTD, patient blood pressure WNL but tachy of >110.     Plan    - Continue Azithromycin and Ceftriaxone  - On Nasal canula   - hyponatremia, test for Legionella.    - RIP 3/9 with rhinovirus/enterovirus  - Fever of 102, repeat of blood cultures  - sputum culture: <10 epi cells, rare WBCs, polymicrobial   - Legionella testing pending  - incentive spirometer  - Airborne and droplet contact isolation      Person with COVID-19  Presumptive case at his nursing home.  Recent international travel within 2 weeks of onset of symptoms, not to a high risk country  - COVID-19 testing positive  - PUI #[id number]  - Infection Control notified  - Limit all visitors  - Supplemental O2 to maintain SpO2 >92%  - avoid BiPAP for respiratory failure due to aerosolization -> intubate instead of using BiPAP  - avoid steroids unless septic shock due to increased duration of viral replication  - only gentle fluid resuscitation due to worsening of hypoxemia/ARDS

## 2020-03-14 NOTE — EICU
Rounding (Video Assessment):  Yes    Intervention Initiated From:  COR / EICU    Anthony Communicated with Bedside Nurse regarding:  Other    Nurse Notified:  No    Doctor Notified:  No    Comments: Pt seen with non rebreather on no distress noted.

## 2020-03-14 NOTE — H&P
[facility]  Critical Care Medicine  History & Physical    Patient Name: Taran Crowell  MRN: [id number]  Admission Date: 3/11/2020  Hospital Length of Stay: 3 days  Code Status: Full Code  Attending Physician: Brijesh Mcintosh MD   Primary Care Provider: Kemi Lopez MD   Principal Problem: Acute hypoxemic respiratory failure    Subjective:     HPI:  Mr. Taran Crowell is a 80 y.o. 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 Patient resides at [facility] which has confirmed COVID -19 case. Patient has recent travel history which about 3 weeks ago patient went on a [place] cruise around the [place]. Patient's wife is also ill with mild cold like symptoms.      History mostly collected from the medical chart. Patient reports onset of symptoms approximately 2 weeks ago he began to develop weakness, severe progressive fatigue, a cough productive of clear sputum, decreased appetite, and overall malaise.  Over the last 2 days, he has developed intermittent fevers and chills, with home temperature reportedly 103 F. He became so weak that he can barely walk, he denies residual deficits from his stroke but did say his initial symptoms were ataxia. Endorses nausea and diarrhea (<4 BM/day) for the last 2 weeks, as well as decreased appetite. Patient denied rhinorrhea, sore throat, eye discharge, ear ache and post-nasal drip. Patient had an ED visit on 03/09 where he was positive for human rhinovirus.  He was prescribed Augmentin and reports taking this as prescribed.      In the emergency department, he was initially hypoxemic on ambient air, temp 99.5, and mildly tachypneic with respiratory rate of 20.  His hypoxemia improved with 2 L of supplemental oxygen.  He was given 1 L of saline and  ceftriaxone, azithromycin. Patient was admitted to hospital medicine team and COVID-19 testing confirmed positive. On 3/13 evening patient ambulated to go to bathroom and had a minor fall which increased his oxygen requirement to 10LNC and saturation dip below 90%. Patient also has fever of 102 and transfer to MICU.     [facility]ICU [facility]:  [facility] acute hypoxemic respiratory failure and febrile illness in the setting of rhino virus positive RIP  and COVID-19 positive. Patient started on Azithromycin and Ceftriaxone for lower respiratory infection. Evening of 3/13/20, patient had a fall ambulating alone to the bathroom and fell. Patient oxygen requirement elevated to 10L so patient was transfer to ICU for closer monitoring.      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 No mass effect or hemorrhage. Additional remote lacunar type infarcts noted in this same region. Decreased signal in the distal right vertebral artery, suggesting hypoplasia or stenosis. The vertebral basilar system is left-sided dominant.    Chronic anticoagulation - on apixaban 5/29/2017    Essential tremor 5/29/2017    On Remeron    GERD (gastroesophageal reflux disease)     History of colon polyps 3/18/2014    Hypothyroidism due to acquired atrophy of thyroid 09/30/2015    Last on 2015.  None since then.    Memory loss last months.    Mixed hyperlipidemia 5/29/2017    Obesity     PAF (paroxysmal atrial fibrillation) 6/16/2015    Sleep apnea     BiPAP       Past Surgical History:   Procedure Laterality Date    COLONOSCOPY  6/23/2000  ([place])    Internal hemorrhoids, otherwise normal exam.    COLONOSCOPY W/  POLYPECTOMY  2010  [place]    One less than 1 mm polyp in the distal sigmoid.  TUBULAR ADENOMA.    One less than 1 mm polyp in the cecum.  TUBULAR ADENOMA.    Non-bleeding internal hemorrhoids.       EYE SURGERY Bilateral     cataracts    UPPER GASTROINTESTINAL ENDOSCOPY  2007  (Dr. Bourgeois)    Grade 1 reflux esophagitis.  Small/medium hiatal hernia.  Pylorus erythema.    UPPER GASTROINTESTINAL ENDOSCOPY  2010  [place]    Slight reflux esophagitis.  Z-line regular, 30 cm from the incisors.   Moderate / large hiatus hernia.  Normal stomach and duodenum.  SELAM Test  Negative.        Review of patient's allergies indicates:  No Known Allergies    Family History     Problem Relation (Age of Onset)    Cancer Father        Tobacco Use    Smoking status: Former Smoker     Types: Pipe     Last attempt to quit: 2000     Years since quittin.1    Smokeless tobacco: Former User    Tobacco comment: smoked pipe regularly once a day but quit 10-15 yrs ago   Substance and Sexual Activity    Alcohol use: Yes     Frequency: 2-3 times a week     Drinks per session: 3 or 4     Binge frequency: Never     Comment: 2-3 glasses of wine weekly    Drug use: No    Sexual activity: Not on file      Review of Systems   Constitutional: Positive for fatigue and fever. Negative for chills and diaphoresis.   HENT: Negative for congestion, ear pain, postnasal drip, sinus pain and sore throat.    Respiratory: Positive for cough and shortness of breath. Negative for choking, wheezing and stridor.    Cardiovascular: Negative for chest pain, palpitations and leg swelling.   Gastrointestinal: Negative for abdominal distention, abdominal pain, constipation, diarrhea, nausea and vomiting.   Genitourinary: Positive for difficulty urinating. Negative for dysuria.   Musculoskeletal: Negative for arthralgias.   Skin: Negative for color change.   Neurological: Positive for weakness. Negative for dizziness, light-headedness and  headaches.   Psychiatric/Behavioral: Negative for agitation, behavioral problems and confusion.     Objective:     Vital Signs (Most Recent):  Temp: (!) 102.9 °F (39.4 °C) (03/13/20 2015)  Pulse: 68 (03/13/20 2200)  Resp: (!) 23 (03/13/20 2200)  BP: (!) 114/59 (03/13/20 2200)  SpO2: (!) 91 % (03/13/20 2200) Vital Signs (24h Range):  Temp:  [98.3 °F (36.8 °C)-102.9 °F (39.4 °C)] 102.9 °F (39.4 °C)  Pulse:  [] 68  Resp:  [17-38] 23  SpO2:  [90 %-96 %] 91 %  BP: (114-147)/(59-82) 114/59   Weight: 91.5 kg (201 lb 11.5 oz)  Body mass index is 29.79 kg/m².      Intake/Output Summary (Last 24 hours) at 3/13/2020 2244  Last data filed at 3/13/2020 1800  Gross per 24 hour   Intake 240 ml   Output 400 ml   Net -160 ml       Physical Exam   Constitutional: He is oriented to person, place, and time. He appears well-developed and well-nourished. No distress.   HENT:   Head: Normocephalic and atraumatic.   Mouth/Throat: Oropharynx is clear and moist.   Eyes: Pupils are equal, round, and reactive to light. EOM are normal.   Neck: Normal range of motion. Neck supple.   Cardiovascular: Normal rate, regular rhythm, normal heart sounds and intact distal pulses.   No murmur heard.  Pulmonary/Chest: No stridor. He is in respiratory distress. He has no wheezes.   On 10 LNC, decrease breath sound throughout lung field. Crackle bilateral.   Abdominal: Soft. Bowel sounds are normal. He exhibits no distension.   Musculoskeletal: Normal range of motion. He exhibits no edema.   Neurological: He is alert and oriented to person, place, and time.   Skin: Skin is warm and dry. Capillary refill takes less than 2 seconds.   Psychiatric: He has a normal mood and affect. His behavior is normal. Judgment and thought content normal.       Vents:     Lines/Drains/Airways     Peripheral Intravenous Line                 Peripheral IV - Single Lumen 03/13/20 2018 20 G Right Antecubital less than 1 day         Peripheral IV - Single Lumen 03/13/20 2019  22 G Left Hand less than 1 day              Significant Labs:    CBC/Anemia Profile:  Recent Labs   Lab 03/12/20  0732 03/13/20 1946 03/13/20 2023   WBC 5.33  --  8.60  8.60   HGB 12.4*  --  12.3*  12.3*   HCT 38.3* 35* 38.5*  38.5*   *  --  141*  141*   MCV 98  --  98  98   RDW 14.0  --  14.2  14.2        Chemistries:  Recent Labs   Lab 03/12/20  0732 03/13/20 2023   * 139   K 3.8 3.8    104   CO2 21* 22*   BUN 10 15   CREATININE 0.8 1.0   CALCIUM 8.2* 8.9   ALBUMIN 3.0* 2.9*   PROT 6.1 6.5   BILITOT 0.4 0.6   ALKPHOS 24* 29*   ALT 43 79*   AST 84* 149*   MG 1.8 1.5*   PHOS 3.0 1.7*       ABGs:   Recent Labs   Lab 03/13/20 1946   PH 7.498*   PCO2 23.0*   HCO3 17.8*   POCSATURATED 92*   BE -5     Blood Culture: No results for input(s): LABBLOO in the last 48 hours.  CMP:   Recent Labs   Lab 03/12/20  0732 03/13/20 2023   * 139   K 3.8 3.8    104   CO2 21* 22*   GLU 90 120*   BUN 10 15   CREATININE 0.8 1.0   CALCIUM 8.2* 8.9   PROT 6.1 6.5   ALBUMIN 3.0* 2.9*   BILITOT 0.4 0.6   ALKPHOS 24* 29*   AST 84* 149*   ALT 43 79*   ANIONGAP 11 13   EGFRNONAA >60.0 >60.0     Lactic Acid:   Recent Labs   Lab 03/13/20 2023   LACTATE 2.3*     Urine Culture: No results for input(s): LABURIN in the last 48 hours.  Urine Studies: No results for input(s): COLORU, APPEARANCEUA, PHUR, SPECGRAV, PROTEINUA, GLUCUA, KETONESU, BILIRUBINUA, OCCULTUA, NITRITE, UROBILINOGEN, LEUKOCYTESUR, RBCUA, WBCUA, BACTERIA, SQUAMEPITHEL, HYALINECASTS in the last 48 hours.    Invalid input(s): ANNA  All pertinent labs within the past 24 hours have been reviewed.    Significant Imaging: I have reviewed all pertinent imaging results/findings within the past 24 hours.    Assessment/Plan:     Neuro  History of stroke  Currently no residual signs or symptoms  - continue anticoagulation and see statin as above    Pulmonary  * Acute hypoxemic respiratory failure   with Rhino virus positive and COVID -19  virus confirmed positive admitted to ICU service for acute hypoxemic respiratory failure requiring increase oxygen requirement. Patient is a resident of [facility] where there were positive cases of COVID-19 virus. Patient had fever of 102F and oxygen requiring 10L nasal canula in ICU service for close monitoring. Initial CXR showed RUL and LLL atelectasis seen. Most likely cause is viral bronchitis from COVID-19 and Rhinovirus versus bacterial PNA. Patient flu negative, blood culture NGTD, patient blood pressure WNL but tachy of >110.     Plan    - Continue Azithromycin and Ceftriaxone  - On Nasal canula   - hyponatremia, test for Legionella.    - RIP 3/9 with rhinovirus/enterovirus  - Fever of 102, repeat of blood cultures  - sputum culture: <10 epi cells, rare WBCs, polymicrobial   - Legionella testing pending  - incentive spirometer  - Airborne and droplet contact isolation      Person with COVID-19  Presumptive case at his nursing home.  Recent international travel within 2 weeks of onset of symptoms, not to a high risk country  - COVID-19 testing positive  - I #[id number]  - Infection Control notified  - Limit all visitors  - Supplemental O2 to maintain SpO2 >92%  - avoid BiPAP for respiratory failure due to aerosolization -> intubate  instead of using BiPAP  - avoid steroids unless septic shock due to increased duration of viral replication  - only gentle fluid resuscitation due to worsening of hypoxemia/ARDS    Cardiac/Vascular  Mixed hyperlipidemia  Continue home statin     PAF (paroxysmal atrial fibrillation)  Patient with history of A-Fib on home Eliquis, Flecainide and Metoprolol. Current in NSR     Plan  - continue apixaban 5 mg BID  - continue flecainide 50 mg BID  - continue metoprolol tartrate 12.5 mg BID  - ECG 3/12 NSR with QTc 423    Renal/  BPH (benign prostatic hyperplasia)  -Continue home finasteride   -Continue home tamsulosin     ID  Rhinovirus  - see acute respiratory failure      Hematology  Chronic anticoagulation - on apixaban  - Continue with Apixaban for A-Fib     Endocrine  Hypothyroidism  TSH level with in normal.   - Continue synthroid 75mcg     GI  Gastroesophageal reflux disease without esophagitis  Continue PPI 40mg Daily     Other  Fever  Most likely due to Viral vs bacterial infection   - Tylenol 650mg PRN for fever greater than 101   - Blood culture 3/13 drawn     Complex sleep apnea syndrome  Will limit CPAP and BiPAP use with confirmed COVID -19 confirmed virus.         Critical Care Daily Checklist:    A: Awake: RASS Goal/Actual Goal:    Actual: Torres Agitation Sedation Scale (RASS): Alert and calm   B: Spontaneous Breathing Trial Performed?     C: SAT & SBT Coordinated?  N/A                      D: Delirium: CAM-ICU Overall CAM-ICU: Negative   E: Early Mobility Performed? No   F: Feeding Goal:    Status:     Current Diet Order   Procedures    Diet [facility]; Isolation Tray - Styrofoam     Order Specific Question:   Indicate patient location for additional diet options:     Answer[facility]     Order Specific Question:   Tray type:     Answer:   Isolation Tray - Styrofoam      AS: Analgesia/Sedation N/A   T: Thromboembolic Prophylaxis Eliquis   H: HOB > 300 Yes   U: Stress Ulcer Prophylaxis (if needed) None   G: Glucose Control None   B: Bowel Function Stool Occurrence: 1   I: Indwelling Catheter (Lines & Acuna) Necessity None   D: De-escalation of Antimicrobials/Pharmacotherapies Continue Antibiotic    Plan for the day/ETD Monitor respiratory status     Code Status:  Family/Goals of Care: Full Code         Critical secondary to Patient has a condition that poses threat to life and bodily function: Severe Respiratory Distress     Critical care was time spent personally by me on the following activities: development of treatment plan with patient or surrogate and bedside caregivers, discussions with consultants, evaluation of patient's response  to treatment, examination of patient, ordering and performing treatments and interventions, ordering and review of laboratory studies, ordering and review of radiographic studies, pulse oximetry, re-evaluation of patient's condition. This critical care time did not overlap with that of any other provider or involve time for any procedures.     VERONICA Sanchez MD  Critical Care Medicine  [facility]

## 2020-03-14 NOTE — HOSPITAL COURSE
[facility] failure and febrile illness in the setting of rhino virus positive RIP and COVID-19 positive. Patient started on Azithromycin and Ceftriaxone for lower respiratory infection. Evening of 3/13/20, patient had a fall ambulating alone to the bathroom and fell. Patient oxygen requirement elevated to 10L so patient was transfer to ICU for closer monitoring.

## 2020-03-14 NOTE — HPI
Mr. Taran Crowell is a 80 y.o. 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 Patient resides at [facility] which has confirmed COVID -19 case. Patient has recent travel history which about 3 weeks ago patient went on a [place] cruise around the [facility]. Patient's wife is also ill with mild cold like symptoms.      History mostly collected from the medical chart. Patient reports onset of symptoms approximately 2 weeks ago he began to develop weakness, severe progressive fatigue, a cough productive of clear sputum, decreased appetite, and overall malaise.  Over the last 2 days, he has developed intermittent fevers and chills, with home temperature reportedly 103 F. He became so weak that he can barely walk, he denies residual deficits from his stroke but did say his initial symptoms were ataxia. Endorses nausea and diarrhea (<4 BM/day) for the last 2 weeks, as well as decreased appetite. Patient denied rhinorrhea, sore throat, eye discharge, ear ache and post-nasal drip. Patient had an ED visit on 03/09 where he was positive for human rhinovirus.  He was prescribed Augmentin and reports taking this as prescribed.      In the emergency department, he was initially hypoxemic on ambient air, temp 99.5, and mildly tachypneic with respiratory rate of 20.  His hypoxemia improved with 2 L of supplemental oxygen.  He was given 1 L of saline and ceftriaxone, azithromycin. Patient was admitted to hospital medicine team and COVID-19 testing confirmed positive. On 3/13 evening patient ambulated to go to bathroom and had a minor fall which increased his oxygen requirement to 10LNC and saturation dip below 90%. Patient also has fever of 102 and transfer to MICU.      Hospital/ICU Course:  [facility] management of acute hypoxemic respiratory failure and febrile illness in the setting of rhino virus positive RIP  and COVID-19 positive. Patient started on Azithromycin and Ceftriaxone for lower respiratory infection. Evening of 3/13/20, patient had a fall ambulating alone to the bathroom and fell. Patient oxygen requirement elevated to 10L so patient was transfer to ICU for closer monitoring.     In this setting, palliative medicine was consulted to help with medical decision making and aid in the formation of goals of care.

## 2020-03-14 NOTE — SUBJECTIVE & OBJECTIVE
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 No mass effect or hemorrhage. Additional remote lacunar type infarcts noted in this same region. Decreased signal in the distal right vertebral artery, suggesting hypoplasia or stenosis. The vertebral basilar system is left-sided dominant.    Chronic anticoagulation - on apixaban 5/29/2017    Essential tremor 5/29/2017    On Remeron    GERD (gastroesophageal reflux disease)     History of colon polyps 3/18/2014    Hypothyroidism due to acquired atrophy of thyroid 09/30/2015    Last on 2015.  None since then.    Memory loss last months.    Mixed hyperlipidemia 5/29/2017    Obesity     PAF (paroxysmal atrial fibrillation) 6/16/2015    Sleep apnea     BiPAP       Past Surgical History:   Procedure Laterality Date    COLONOSCOPY  6/23/2000  ([place])    Internal hemorrhoids, otherwise normal exam.    COLONOSCOPY W/ POLYPECTOMY  2/12/2010  [place]    One less than 1 mm polyp in the distal sigmoid.  TUBULAR ADENOMA.    One less than 1 mm polyp in the cecum.  TUBULAR ADENOMA.    Non-bleeding internal hemorrhoids.       EYE SURGERY Bilateral     cataracts    UPPER GASTROINTESTINAL ENDOSCOPY  2/7/2007  (Dr. Bourgeois)    Grade 1 reflux esophagitis.  Small/medium hiatal hernia.  Pylorus erythema.    UPPER GASTROINTESTINAL ENDOSCOPY  2/12/2010  [place]    Slight reflux esophagitis.  Z-line regular, 30 cm from the incisors.   Moderate / large hiatus hernia.  Normal stomach and duodenum.  SELAM Test  Negative.        Review of patient's allergies indicates:  No Known Allergies    Medications:  Continuous Infusions:  Scheduled Meds:   apixaban  5 mg Oral  BID    atorvastatin  40 mg Oral Daily    azithromycin  250 mg Oral Daily    finasteride  5 mg Oral Daily    flecainide  50 mg Oral Q12H    levothyroxine  75 mcg Oral Before breakfast    metoprolol tartrate  12.5 mg Oral BID    mirtazapine  15 mg Oral Nightly    pantoprazole  40 mg Oral Daily    piperacillin-tazobactam (ZOSYN) IVPB  4.5 g Intravenous Q8H    spironolactone  25 mg Oral Daily    tamsulosin  0.4 mg Oral Daily    vancomycin (VANCOCIN) IVPB  15 mg/kg Intravenous Q12H    vancomycin (VANCOCIN) IVPB  2,000 mg Intravenous Once     PRN Meds:acetaminophen, Dextrose 10% Bolus, Dextrose 10% Bolus, glucagon (human recombinant), glucose, glucose, melatonin, ondansetron, promethazine, sodium chloride 0.9%, sodium chloride 0.9%    Family History     Problem Relation (Age of Onset)    Cancer Father        Tobacco Use    Smoking status: Former Smoker     Types: Pipe     Last attempt to quit: 2000     Years since quittin.1    Smokeless tobacco: Former User    Tobacco comment: smoked pipe regularly once a day but quit 10-15 yrs ago   Substance and Sexual Activity    Alcohol use: Yes     Frequency: 2-3 times a week     Drinks per session: 3 or 4     Binge frequency: Never     Comment: 2-3 glasses of wine weekly    Drug use: No    Sexual activity: Not on file       Review of Systems  Objective:     Vital Signs (Most Recent):  Temp: 99.3 °F (37.4 °C) (20 1130)  Pulse: 86 (20 1144)  Resp: (!) 30 (20 1144)  BP: 121/80 (20 1144)  SpO2: (!) 89 % (20 1144) Vital Signs (24h Range):  Temp:  [97.9 °F (36.6 °C)-102.9 °F (39.4 °C)] 99.3 °F (37.4 °C)  Pulse:  [] 86  Resp:  [17-63] 30  SpO2:  [88 %-99 %] 89 %  BP: (114-175)/() 121/80     Weight: 89 kg (196 lb 3.4 oz)  Body mass index is 28.98 kg/m².    Review of Symptoms  Symptom Assessment (ESAS 0-10 scale)   ESAS 0 1 2 3 4 5 6 7 8 9 10   Pain              Dyspnea              Anxiety              Nausea               Depression               Anorexia              Fatigue              Insomnia              Restlessness               Agitation              CAM / Delirium __ --  ___+   Constipation     __ --  ___+   Diarrhea           __ --  ___+  Bowel Management Plan (BMP): Yes    Comments: none    Pain Assessment: 0    OME in 24 hours: 0    Performance Status: 70      Physical Exam    Significant Labs:   Coagulation: No results for input(s): PT, INR, APTT in the last 48 hours.  Lactic acid:   Recent Labs   Lab 03/13/20 2023 03/14/20  0035   LACTATE 2.3* 0.9     LFT:   Recent Labs   Lab 03/14/20  0304   *   ALKPHOS 26*   BILITOT 0.5     CBC:   Recent Labs   Lab 03/14/20 0304   WBC 10.44   HGB 12.1*   HCT 38.0*   MCV 98        BMP:  Recent Labs   Lab 03/14/20 0304   *      K 4.2      CO2 23   BUN 14   CREATININE 0.8   CALCIUM 8.8   MG 1.9     LFT:  Lab Results   Component Value Date     (H) 03/14/2020    ALKPHOS 26 (L) 03/14/2020    BILITOT 0.5 03/14/2020     Albumin:   Albumin   Date Value Ref Range Status   03/14/2020 2.7 (L) 3.5 - 5.2 g/dL Final     Protein:   Total Protein   Date Value Ref Range Status   03/14/2020 6.2 6.0 - 8.4 g/dL Final     Lactic acid:   Lab Results   Component Value Date    LACTATE 0.9 03/14/2020    LACTATE 2.3 (H) 03/13/2020       Significant Imaging: I have reviewed all pertinent imaging results/findings within the past 24 hours.    Advance Care Planning   Advanced Directives::  Living Will: Yes. Copy on chart: No  LaPOST: No  Do Not Resuscitate Status: Yes; as of today's conversation  Medical Power of : Yes. Agent's Name: wife . Agent's Contact Number:   Linh Crowell Spouse [phone]         Decision-Making Capacity: Patient answered questions, Family answered questions       Living Arrangements: Lives [facility]    Psychosocial/Cultural:  Patient's most important priorities:  receiving best chance to recover  without burdensome and non beneficial treatments     Patient's biggest concerns/fears:  suffering    Previous death/end of life care history:  Father in law  a few years ago and family was torn    Patient's goals/hopes:  To get better    Spiritual: did not address today

## 2020-03-14 NOTE — EICU
Rounding (Video Assessment):  Yes    Intervention Initiated From:  COR / EICU    Anthony Communicated with Bedside Nurse regarding:  Respiratory    Nurse Notified:  No    Doctor Notified:  No    Comments: covid 19 rounding, VSS:  60, 17, 119/57, 92%

## 2020-03-14 NOTE — EICU
Rounding (Video Assessment):  Yes    Intervention Initiated From:  [facility] / EICU    Anthony Communicated with Bedside Nurse regarding:  Other    Nurse Notified:  No    Doctor Notified:  No    Comments:

## 2020-03-14 NOTE — CODE/ RAPID DOCUMENTATION
"RAPID RESPONSE NURSE NOTE     Admit Date: 3/11/2020  LOS: 2  Code Status: Full Code   Date of Consult: 2020  : 1940  Age: 80 y.o.  Weight:   Wt Readings from Last 1 Encounters:   20 91.5 kg (201 lb 11.5 oz)     Sex: male  Race: White   Bed: [address] [place] [address] A:   MRN: [id number]  Time Rapid Response Team page Received:   Time Rapid Response Team at Bedside:   Time Rapid Response Team left Bedside:   Was the patient discharged from an ICU this admission?   no  Was the patient discharged from a PACU within last 24 hours?  no  Did the patient receive conscious sedation/general anesthesia within last 24 hours?  no  Was the patient in the ED within the past 24 hours?  no  Was the patient started on NIPPV within the past 24 hours?  no  Did this progress into an ARC or CPA:  no  Attending Physician: Simona Crowder MD  Primary Service: [facility] MED V  Consult Requested By: Simona Crowder MD     SITUATION     Notified by code pager.  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    ASSESSMENT/INTERVENTIONS     /79 (BP Location: Left arm, Patient Position: Lying)   Pulse (!) 117   Temp 98.6 °F (37 °C) (Oral)   Resp 17   Ht 5' 9" (1.753 m)   Wt 91.5 kg (201 lb 11.5 oz)   SpO2 (!) 90%   BMI 29.79 kg/m²     What did you find: Per bedside RN, patient had ambulated to bathroom and desaturated to 70s. Patient was previously on 2L NC. Of note, patient " is COVID19 positive. On assessment, patient was sitting in chair on 7L NC with SpO2 91% HR 90-100s. Patient endorses moderate shortness of breath. Dr. Mcintosh with CCM at bedside to assess patient. ABG completed:    Results for KURT CARRILLO (MRN [id number]) as of 3/13/2020 20:23   Ref. Range 3/13/2020 19:46   POC PH Latest Ref Range: 7.35 - 7.45  7.498 (H)   POC PCO2 Latest Ref Range: 35 - 45 mmHg 23.0 (LL)   POC PO2 Latest Ref Range: 80 - 100 mmHg 56 (LL)   POC BE Latest Ref Range: -2 to 2 mmol/L -5   POC HCO3 Latest Ref Range: 24 - 28 mmol/L 17.8 (L)   POC SATURATED O2 Latest Ref Range: 95 - 100 % 92 (L)   POC TCO2 Latest Ref Range: 23 - 27 mmol/L 19 (L)   Flow Unknown 7   Sample Unknown ARTERIAL   DelSys Unknown Nasal Can   Allens Test Unknown Pass   Site Unknown RR   Mode Unknown SPONT     Decision made to transfer patient to ICU. House Supervisor REINIER Stephenson and ICU CN Carmen notified of COVID status and transfer. O2 increased to 10L NC, patient placed on portable telemetry monitor, and N95 mask placed on patient for transfer. [facility] RRS[facility] Murray and RRT Yoana.        RECOMMENDATIONS     We recommend: ICU admission    FOLLOW-UP/CONTINGENCY PLAN     Call the Rapid Response Nurse, Susi Mancilla, RN at x [phone] for additional questions or concerns.    PHYSICIAN ESCALATION     Orders received and case discussed with Dr. Mcintosh and Magen Saunders.    Disposition: Tx in ICU bed 6087A.

## 2020-03-14 NOTE — SUBJECTIVE & OBJECTIVE
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 No mass effect or hemorrhage. Additional remote lacunar type infarcts noted in this same region. Decreased signal in the distal right vertebral artery, suggesting hypoplasia or stenosis. The vertebral basilar system is left-sided dominant.    Chronic anticoagulation - on apixaban 5/29/2017    Essential tremor 5/29/2017    On Remeron    GERD (gastroesophageal reflux disease)     History of colon polyps 3/18/2014    Hypothyroidism due to acquired atrophy of thyroid 09/30/2015    Last on 2015.  None since then.    Memory loss last months.    Mixed hyperlipidemia 5/29/2017    Obesity     PAF (paroxysmal atrial fibrillation) 6/16/2015    Sleep apnea     BiPAP       Past Surgical History:   Procedure Laterality Date    COLONOSCOPY  6/23/2000  ([place])    Internal hemorrhoids, otherwise normal exam.    COLONOSCOPY W/ POLYPECTOMY  2/12/2010  [place]    One less than 1 mm polyp in the distal sigmoid.  TUBULAR ADENOMA.    One less than 1 mm polyp in the cecum.  TUBULAR ADENOMA.    Non-bleeding internal hemorrhoids.       EYE SURGERY Bilateral     cataracts    UPPER GASTROINTESTINAL ENDOSCOPY  2/7/2007  (Dr. Bourgeois)    Grade 1 reflux esophagitis.  Small/medium hiatal hernia.  Pylorus erythema.    UPPER GASTROINTESTINAL ENDOSCOPY  2/12/2010  [facility]    Slight reflux esophagitis.  Z-line regular, 30 cm from the incisors.   Moderate / large hiatus hernia.  Normal stomach and duodenum.  SELAM Test  Negative.        Review of patient's allergies indicates:  No Known Allergies    Family History     Problem Relation (Age of Onset)    Cancer Father        Tobacco Use    Smoking status: Former Smoker     Types: Pipe      Last attempt to quit: 2000     Years since quittin.1    Smokeless tobacco: Former User    Tobacco comment: smoked pipe regularly once a day but quit 10-15 yrs ago   Substance and Sexual Activity    Alcohol use: Yes     Frequency: 2-3 times a week     Drinks per session: 3 or 4     Binge frequency: Never     Comment: 2-3 glasses of wine weekly    Drug use: No    Sexual activity: Not on file      Review of Systems   Constitutional: Positive for fatigue and fever. Negative for chills and diaphoresis.   HENT: Negative for congestion, ear pain, postnasal drip, sinus pain and sore throat.    Respiratory: Positive for cough and shortness of breath. Negative for choking, wheezing and stridor.    Cardiovascular: Negative for chest pain, palpitations and leg swelling.   Gastrointestinal: Negative for abdominal distention, abdominal pain, constipation, diarrhea, nausea and vomiting.   Genitourinary: Positive for difficulty urinating. Negative for dysuria.   Musculoskeletal: Negative for arthralgias.   Skin: Negative for color change.   Neurological: Positive for weakness. Negative for dizziness, light-headedness and headaches.   Psychiatric/Behavioral: Negative for agitation, behavioral problems and confusion.     Objective:     Vital Signs (Most Recent):  Temp: (!) 102.9 °F (39.4 °C) (20)  Pulse: 68 (20)  Resp: (!) 23 (20)  BP: (!) 114/59 (20)  SpO2: (!) 91 % (20) Vital Signs (24h Range):  Temp:  [98.3 °F (36.8 °C)-102.9 °F (39.4 °C)] 102.9 °F (39.4 °C)  Pulse:  [] 68  Resp:  [17-38] 23  SpO2:  [90 %-96 %] 91 %  BP: (114-147)/(59-82) 114/59   Weight: 91.5 kg (201 lb 11.5 oz)  Body mass index is 29.79 kg/m².      Intake/Output Summary (Last 24 hours) at 3/13/2020 2834  Last data filed at 3/13/2020 1800  Gross per 24 hour   Intake 240 ml   Output 400 ml   Net -160 ml       Physical Exam   Constitutional: He is oriented to person, place, and time. He  appears well-developed and well-nourished. No distress.   HENT:   Head: Normocephalic and atraumatic.   Mouth/Throat: Oropharynx is clear and moist.   Eyes: Pupils are equal, round, and reactive to light. EOM are normal.   Neck: Normal range of motion. Neck supple.   Cardiovascular: Normal rate, regular rhythm, normal heart sounds and intact distal pulses.   No murmur heard.  Pulmonary/Chest: No stridor. He is in respiratory distress. He has no wheezes.   On 10 LNC, decrease breath sound throughout lung field. Crackle bilateral.   Abdominal: Soft. Bowel sounds are normal. He exhibits no distension.   Musculoskeletal: Normal range of motion. He exhibits no edema.   Neurological: He is alert and oriented to person, place, and time.   Skin: Skin is warm and dry. Capillary refill takes less than 2 seconds.   Psychiatric: He has a normal mood and affect. His behavior is normal. Judgment and thought content normal.       Vents:     Lines/Drains/Airways     Peripheral Intravenous Line                 Peripheral IV - Single Lumen 03/13/20 2018 20 G Right Antecubital less than 1 day         Peripheral IV - Single Lumen 03/13/20 2019 22 G Left Hand less than 1 day              Significant Labs:    CBC/Anemia Profile:  Recent Labs   Lab 03/12/20  0732 03/13/20 1946 03/13/20 2023   WBC 5.33  --  8.60  8.60   HGB 12.4*  --  12.3*  12.3*   HCT 38.3* 35* 38.5*  38.5*   *  --  141*  141*   MCV 98  --  98  98   RDW 14.0  --  14.2  14.2        Chemistries:  Recent Labs   Lab 03/12/20  0732 03/13/20 2023   * 139   K 3.8 3.8    104   CO2 21* 22*   BUN 10 15   CREATININE 0.8 1.0   CALCIUM 8.2* 8.9   ALBUMIN 3.0* 2.9*   PROT 6.1 6.5   BILITOT 0.4 0.6   ALKPHOS 24* 29*   ALT 43 79*   AST 84* 149*   MG 1.8 1.5*   PHOS 3.0 1.7*       ABGs:   Recent Labs   Lab 03/13/20 1946   PH 7.498*   PCO2 23.0*   HCO3 17.8*   POCSATURATED 92*   BE -5     Blood Culture: No results for input(s): LABBLOO in the last 48  hours.  CMP:   Recent Labs   Lab 03/12/20  0732 03/13/20 2023   * 139   K 3.8 3.8    104   CO2 21* 22*   GLU 90 120*   BUN 10 15   CREATININE 0.8 1.0   CALCIUM 8.2* 8.9   PROT 6.1 6.5   ALBUMIN 3.0* 2.9*   BILITOT 0.4 0.6   ALKPHOS 24* 29*   AST 84* 149*   ALT 43 79*   ANIONGAP 11 13   EGFRNONAA >60.0 >60.0     Lactic Acid:   Recent Labs   Lab 03/13/20 2023   LACTATE 2.3*     Urine Culture: No results for input(s): LABURIN in the last 48 hours.  Urine Studies: No results for input(s): COLORU, APPEARANCEUA, PHUR, SPECGRAV, PROTEINUA, GLUCUA, KETONESU, BILIRUBINUA, OCCULTUA, NITRITE, UROBILINOGEN, LEUKOCYTESUR, RBCUA, WBCUA, BACTERIA, SQUAMEPITHEL, HYALINECASTS in the last 48 hours.    Invalid input(s): ANNA  All pertinent labs within the past 24 hours have been reviewed.    Significant Imaging: I have reviewed all pertinent imaging results/findings within the past 24 hours.

## 2020-03-14 NOTE — CONSULTS
[facility]  Palliative Medicine  Consult Note    Patient Name: Taran Crowell  MRN: [id number]  Admission Date: 3/11/2020  Hospital Length of Stay: 3 days  Code Status: Full Code   Attending Provider: Brijesh Mcintosh MD  Consulting Provider: Papa Marley MD  Primary Care Physician: Kemi Lopez MD  Principal Problem:Acute hypoxemic respiratory failure    Patient information was obtained from patient, spouse/SO, relative(s) and past medical records.      Consults  Assessment/Plan:     Dyspnea  On going supportive care  Consider bedside fan; cooler room temps  Low dose morphine for dyspnea refractory to other treatment or if significantly declining    Palliative care by specialist  80 male highly functional male with h/o CVA admitted with hypoxemic respiratory failure due to COVID-19 and multilobar PNA    1) Symptoms: dyspnea: ongoing supportive care; refractory symptoms consider low dose morphine 2 mg q 1 hour prn; fan in room; cooler air temps    2) Code status: based on today's discussion with pt and family; DNR/DNI placed on chart based on pt's values and treatment recommendations    3) Psychosocial: highly functional, still teaching law; lived and functioned independently    4) Medicolegal: has AD/LW (not on chart); wife is HCPOA    5) Spiritual: Advent    6) Goals of care/discussion:  As below    7) Plan:   Plan:   Change code status to DNR in accordance to patient's values and previous declared end of life preferences.      Cont best supportive care available     Cont open communication with family based on patient status     In the event of clinical deterioration, the family and patient would want to avoid inappropriate prolongation of the dying process and aggressive symptom management.      Will remain available. Please call with questions.     Papa Marley MD  Palliative Medicine  [phone]          Thank you for your consult. I will follow-up with patient. Please contact  us if you have any additional questions.    Subjective:     HPI:   Mr. Taran Crowell is a 80 y.o. 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 Patient resides at [facility] which has confirmed COVID -19 case. Patient has recent travel history which about 3 weeks ago patient went on a [place] cruise around the [facility]. Patient's wife is also ill with mild cold like symptoms.      History mostly collected from the medical chart. Patient reports onset of symptoms approximately 2 weeks ago he began to develop weakness, severe progressive fatigue, a cough productive of clear sputum, decreased appetite, and overall malaise.  Over the last 2 days, he has developed intermittent fevers and chills, with home temperature reportedly 103 F. He became so weak that he can barely walk, he denies residual deficits from his stroke but did say his initial symptoms were ataxia. Endorses nausea and diarrhea (<4 BM/day) for the last 2 weeks, as well as decreased appetite. Patient denied rhinorrhea, sore throat, eye discharge, ear ache and post-nasal drip. Patient had an ED visit on 03/09 where he was positive for human rhinovirus.  He was prescribed Augmentin and reports taking this as prescribed.      In the emergency department, he was initially hypoxemic on ambient air, temp 99.5, and mildly tachypneic with respiratory rate of 20.  His hypoxemia improved with 2 L of supplemental oxygen.  He was given 1 L of saline and ceftriaxone, azithromycin. Patient was admitted to hospital medicine team and COVID-19 testing confirmed positive. On 3/13 evening patient ambulated to go to bathroom and had a minor fall which increased his oxygen requirement to 10LNC and saturation dip below 90%. Patient also has fever of 102 and transfer to  MICU.      Hospital/ICU Course:  [facility] and febrile illness in the setting of rhino virus positive RIP  and COVID-19 positive. Patient started on Azithromycin and Ceftriaxone for lower respiratory infection. Evening of 3/13/20, patient had a fall ambulating alone to the bathroom and fell. Patient oxygen requirement elevated to 10L so patient was transfer to ICU for closer monitoring.     In this setting, palliative medicine was consulted to help with medical decision making and aid in the formation of goals of care.       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 No mass effect or hemorrhage. Additional remote lacunar type infarcts noted in this same region. Decreased signal in the distal right vertebral artery, suggesting hypoplasia or stenosis. The vertebral basilar system is left-sided dominant.    Chronic anticoagulation - on apixaban 5/29/2017    Essential tremor 5/29/2017    On Remeron    GERD (gastroesophageal reflux disease)     History of colon polyps 3/18/2014    Hypothyroidism due to acquired atrophy of thyroid 09/30/2015    Last on 2015.  None since then.    Memory loss last months.    Mixed hyperlipidemia 5/29/2017    Obesity     PAF (paroxysmal atrial fibrillation) 6/16/2015    Sleep apnea     BiPAP       Past Surgical History:   Procedure Laterality Date    COLONOSCOPY  6/23/2000  ([place])    Internal hemorrhoids, otherwise normal exam.    COLONOSCOPY W/ POLYPECTOMY  2/12/2010  [facility]    One less than 1 mm polyp in the distal sigmoid.   TUBULAR ADENOMA.    One less than 1 mm polyp in the cecum.  TUBULAR ADENOMA.    Non-bleeding internal hemorrhoids.       EYE SURGERY Bilateral     cataracts    UPPER GASTROINTESTINAL ENDOSCOPY  2007  (Dr. Bourgeois)    Grade 1 reflux esophagitis.  Small/medium hiatal hernia.  Pylorus erythema.    UPPER GASTROINTESTINAL ENDOSCOPY  2010  [facility]    Slight reflux esophagitis.  Z-line regular, 30 cm from the incisors.   Moderate / large hiatus hernia.  Normal stomach and duodenum.  SELAM Test  Negative.        Review of patient's allergies indicates:  No Known Allergies    Medications:  Continuous Infusions:  Scheduled Meds:   apixaban  5 mg Oral BID    atorvastatin  40 mg Oral Daily    azithromycin  250 mg Oral Daily    finasteride  5 mg Oral Daily    flecainide  50 mg Oral Q12H    levothyroxine  75 mcg Oral Before breakfast    metoprolol tartrate  12.5 mg Oral BID    mirtazapine  15 mg Oral Nightly    pantoprazole  40 mg Oral Daily    piperacillin-tazobactam (ZOSYN) IVPB  4.5 g Intravenous Q8H    spironolactone  25 mg Oral Daily    tamsulosin  0.4 mg Oral Daily    vancomycin (VANCOCIN) IVPB  15 mg/kg Intravenous Q12H    vancomycin (VANCOCIN) IVPB  2,000 mg Intravenous Once     PRN Meds:acetaminophen, Dextrose 10% Bolus, Dextrose 10% Bolus, glucagon (human recombinant), glucose, glucose, melatonin, ondansetron, promethazine, sodium chloride 0.9%, sodium chloride 0.9%    Family History     Problem Relation (Age of Onset)    Cancer Father        Tobacco Use    Smoking status: Former Smoker     Types: Pipe     Last attempt to quit: 2000     Years since quittin.1    Smokeless tobacco: Former User    Tobacco comment: smoked pipe regularly once a day but quit 10-15 yrs ago   Substance and Sexual Activity    Alcohol use: Yes     Frequency: 2-3 times a week     Drinks per session: 3 or 4     Binge frequency: Never     Comment: 2-3 glasses of wine weekly    Drug use: No    Sexual activity: Not  on file       Review of Systems  Objective:     Vital Signs (Most Recent):  Temp: 99.3 °F (37.4 °C) (03/14/20 1130)  Pulse: 86 (03/14/20 1144)  Resp: (!) 30 (03/14/20 1144)  BP: 121/80 (03/14/20 1144)  SpO2: (!) 89 % (03/14/20 1144) Vital Signs (24h Range):  Temp:  [97.9 °F (36.6 °C)-102.9 °F (39.4 °C)] 99.3 °F (37.4 °C)  Pulse:  [] 86  Resp:  [17-63] 30  SpO2:  [88 %-99 %] 89 %  BP: (114-175)/() 121/80     Weight: 89 kg (196 lb 3.4 oz)  Body mass index is 28.98 kg/m².    Review of Symptoms  Symptom Assessment (ESAS 0-10 scale)   ESAS 0 1 2 3 4 5 6 7 8 9 10   Pain              Dyspnea              Anxiety              Nausea              Depression               Anorexia              Fatigue              Insomnia              Restlessness               Agitation              CAM / Delirium __ --  ___+   Constipation     __ --  ___+   Diarrhea           __ --  ___+  Bowel Management Plan (BMP): Yes    Comments: none    Pain Assessment: 0    OME in 24 hours: 0    Performance Status: 70      Physical Exam  Constitutional: He appears well-developed and well-nourished. No distress.   HENT:   Head: Normocephalic and atraumatic.   Nose: Nose normal.   HF02   Eyes: Pupils are equal, round, and reactive to light. EOM are normal.   Neck: Normal range of motion. Neck supple.   Cardiovascular: Exam reveals no friction rub.   No murmur heard.  Tachycardic and regular   Pulmonary/Chest: He is in respiratory distress.   Slight increase in WOB; coarse BS throughout   Abdominal: Soft. Bowel sounds are normal. He exhibits distension. He exhibits no mass. There is no tenderness. There is no rebound and no guarding.   Genitourinary:   Genitourinary Comments: Acuna with clear urine   Musculoskeletal: Normal range of motion.   Neurological: He is alert.   Confused today   Skin: Skin is warm and dry. Capillary refill takes less than 2 seconds. He is not diaphoretic.   Psychiatric:   Calm      Significant Labs:   Coagulation:  No results for input(s): PT, INR, APTT in the last 48 hours.  Lactic acid:   Recent Labs   Lab 20  0035   LACTATE 2.3* 0.9     LFT:   Recent Labs   Lab 20  0304   *   ALKPHOS 26*   BILITOT 0.5     CBC:   Recent Labs   Lab 20  0304   WBC 10.44   HGB 12.1*   HCT 38.0*   MCV 98        BMP:  Recent Labs   Lab 20  0304   *      K 4.2      CO2 23   BUN 14   CREATININE 0.8   CALCIUM 8.8   MG 1.9     LFT:  Lab Results   Component Value Date     (H) 2020    ALKPHOS 26 (L) 2020    BILITOT 0.5 2020     Albumin:   Albumin   Date Value Ref Range Status   2020 2.7 (L) 3.5 - 5.2 g/dL Final     Protein:   Total Protein   Date Value Ref Range Status   2020 6.2 6.0 - 8.4 g/dL Final     Lactic acid:   Lab Results   Component Value Date    LACTATE 0.9 2020    LACTATE 2.3 (H) 2020       Significant Imaging: I have reviewed all pertinent imaging results/findings within the past 24 hours.    Advance Care Planning   Advanced Directives::  Living Will: Yes. Copy on chart: No  LaPOST: No  Do Not Resuscitate Status: Yes; as of today's conversation  Medical Power of : Yes. Agent's Name: wife . Agent's Contact Number:   Linh Crowell Spouse [phone]         Decision-Making Capacity: Patient answered questions, Family answered questions       Living Arrangements: Lives [facility] at [facility]    Psychosocial/Cultural:  Patient's most important priorities:  receiving best chance to recover without burdensome and non beneficial treatments     Patient's biggest concerns/fears:  suffering    Previous death/end of life care history:  Father in law  a few years ago and family was torn    Patient's goals/hopes:  To get better    Spiritual: did not address today      > 50% of 80 min visit spent in chart review, face to face discussion of goals of care,  symptom assessment, coordination of care  and emotional support.    Papa Marley MD  Palliative Medicine  [facility]

## 2020-03-14 NOTE — ASSESSMENT & PLAN NOTE
Patient with history of A-Fib on home Eliquis, Flecainide and Metoprolol. Current in NSR     Plan  - continue apixaban 5 mg BID  - continue flecainide 50 mg BID  - continue metoprolol tartrate 12.5 mg BID  - ECG 3/12 NSR with QTc 423

## 2020-03-15 PROBLEM — Z71.89 ACP (ADVANCE CARE PLANNING): Status: ACTIVE | Noted: 2020-01-01

## 2020-03-15 NOTE — PROGRESS NOTES
[facility]  Palliative Medicine  Progress Note    Patient Name: Taran Crowell  MRN: [id number]  Admission Date: 3/11/2020  Hospital Length of Stay: 4 days  Code Status: DNR   Attending Provider: Brijesh Mcintosh MD  Consulting Provider: Papa Marley MD  Primary Care Physician: Kemi Lopez MD  Principal Problem:Acute hypoxemic respiratory failure    Patient information was obtained from patient, relative(s) and notes.      Assessment/Plan:     ACP (advance care planning)  Obtained copy of advance directives and placed on chart    Dyspnea  On going supportive care  Consider bedside fan; cooler room temps  Low dose morphine for dyspnea refractory to other treatment or if significantly declining    Palliative care by specialist  80 male highly functional male with h/o CVA admitted with hypoxemic respiratory failure due to COVID-19 and multilobar PNA    1) Symptoms: dyspnea: ongoing supportive care; for refractory symptoms consider low dose morphine 2 mg q 1 hour prn; fan in room; cooler air temps    2) Code status: based on today's discussion with pt and family; DNR/DNI placed on chart based on pt's values and treatment recommendations;     3) Psychosocial: highly functional, still teaching law; lived and functioned independently; 4 adult children lvies locally (Laine-daughter- is an NP in urgent care/occupational health/former oncology nurse)    4) Medicolegal: has AD/LW (placed on chart); wife is HCPOA    5) Spiritual: [employer]  visited last night for anointing of the sick    6) Goals of care/discussion:  Updated with Dr. Mcintosh to the patient and family. Pt hopes for improvement but both he and the family are aware his condition remains critical and tenuous at best.  Discussed the treatment plan for the day, care plan for best and worse case scenarios.     The pt's wife also tested positive for Covid-19 and taking precautions to care for herself and minimize exposure to others.      Answered all questions.     7) Plan:   Plan:   code status to DNR in accordance to patient's values and previous declared end of life preferences.      Cont best supportive care available     Cont open communication with family based on patient status     In the event of clinical deterioration, the family and patient would want to avoid inappropriate prolongation of the dying process and aggressive symptom management.      Will remain available. Please call with questions.     Papa Marley MD  Palliative Medicine  [phone]          I will follow-up with patient. Please contact us if you have any additional questions.    Subjective:     Chief Complaint:   Chief Complaint   Patient presents with    Fever     exposed to Coronavirus at [facility]; here Monday, 103 temp today       HPI:   Mr. Taran Crowell is a 80 y.o. 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 Patient resides at [facility] which has confirmed COVID -19 case. Patient has recent travel history which about 3 weeks ago patient went on a [place] cruise around the [place]. Patient's wife is also ill with mild cold like symptoms.      History mostly collected from the medical chart. Patient reports onset of symptoms approximately 2 weeks ago he began to develop weakness, severe progressive fatigue, a cough productive of clear sputum, decreased appetite, and overall malaise.  Over the last 2 days, he has developed intermittent fevers and chills, with home temperature reportedly 103 F. He became so weak that he can barely walk, he denies residual deficits from his stroke but did say his initial symptoms were ataxia. Endorses nausea and diarrhea (<4 BM/day) for the last 2 weeks, as well as decreased appetite. Patient denied rhinorrhea, sore throat,  eye discharge, ear ache and post-nasal drip. Patient had an ED visit on 03/09 where he was positive for human rhinovirus.  He was prescribed Augmentin and reports taking this as prescribed.      In the emergency department, he was initially hypoxemic on ambient air, temp 99.5, and mildly tachypneic with respiratory rate of 20.  His hypoxemia improved with 2 L of supplemental oxygen.  He was given 1 L of saline and ceftriaxone, azithromycin. Patient was admitted to hospital medicine team and COVID-19 testing confirmed positive. On 3/13 evening patient ambulated to go to bathroom and had a minor fall which increased his oxygen requirement to 10LNC and saturation dip below 90%. Patient also has fever of 102 and transfer to MICU.      Hospital/ICU Course:  Mr. Crowell was  initally Hospital Medicine for management of acute hypoxemic respiratory failure and febrile illness in the setting of rhino virus positive RIP  and COVID-19 positive. Patient started on Azithromycin and Ceftriaxone for lower respiratory infection. Evening of 3/13/20, patient had a fall ambulating alone to the bathroom and fell. Patient oxygen requirement elevated to 10L so patient was transfer to ICU for closer monitoring.     In this setting, palliative medicine was consulted to help with medical decision making and aid in the formation of goals of care.       Hospital Course:  No notes on file    Interval History: discussed with ICU team, family and patient; requiring higher O2 levels on HFNC at 100% 30L.  Pt without specific complaints and feels he is actually feeling better.      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 No mass effect or hemorrhage. Additional remote lacunar type  infarcts noted in this same region. Decreased signal in the distal right vertebral artery, suggesting hypoplasia or stenosis. The vertebral basilar system is left-sided dominant.    Chronic anticoagulation - on apixaban 5/29/2017    Essential tremor 5/29/2017    On Remeron    GERD (gastroesophageal reflux disease)     History of colon polyps 3/18/2014    Hypothyroidism due to acquired atrophy of thyroid 09/30/2015    Last on 2015.  None since then.    Memory loss last months.    Mixed hyperlipidemia 5/29/2017    Obesity     PAF (paroxysmal atrial fibrillation) 6/16/2015    Sleep apnea     BiPAP       Past Surgical History:   Procedure Laterality Date    COLON[place]  6/23/2000  ([place])    Internal hemorrhoids, otherwise normal exam.    COLONOSCOPY W/ POLYPECTOMY  2/12/2010  [facility]    One less than 1 mm polyp in the distal sigmoid.  TUBULAR ADENOMA.    One less than 1 mm polyp in the cecum.  TUBULAR ADENOMA.    Non-bleeding internal hemorrhoids.       EYE SURGERY Bilateral     cataracts    UPPER GASTROINTESTINAL ENDOSCOPY  2/7/2007  (Dr. Bourgeois)    Grade 1 reflux esophagitis.  Small/medium hiatal hernia.  Pylorus erythema.    UPPER GASTROINTESTINAL ENDOSCOPY  2/12/2010  [facility]    Slight reflux esophagitis.  Z-line regular, 30 cm from the incisors.   Moderate / large hiatus hernia.  Normal stomach and duodenum.  SELAM Test  Negative.        Review of patient's allergies indicates:  No Known Allergies    Medications:  Continuous Infusions:  Scheduled Meds:   apixaban  5 mg Oral BID    atorvastatin  40 mg Oral Daily    finasteride  5 mg Oral Daily    flecainide  50 mg Oral Q12H    fluticasone propionate  2 spray Each Nostril Daily    [START ON 3/16/2020] hydroxychloroquine  200 mg Oral BID    hydroxychloroquine  400 mg Oral BID    levothyroxine  75 mcg Oral Before breakfast    methylPREDNISolone sodium succinate  40 mg Intravenous Q12H    metoprolol tartrate  12.5 mg Oral BID    mirtazapine  15 mg  Oral Nightly    pantoprazole  40 mg Oral Daily    piperacillin-tazobactam (ZOSYN) IVPB  4.5 g Intravenous Q8H    spironolactone  25 mg Oral Daily    tamsulosin  0.4 mg Oral Daily    vancomycin (VANCOCIN) IVPB  15 mg/kg Intravenous Q12H     PRN Meds:acetaminophen, Dextrose 10% Bolus, Dextrose 10% Bolus, glucagon (human recombinant), glucose, glucose, melatonin, ondansetron, promethazine, sodium chloride 0.9%, sodium chloride 0.9%    Family History     Problem Relation (Age of Onset)    Cancer Father        Tobacco Use    Smoking status: Former Smoker     Types: Pipe     Last attempt to quit: 2000     Years since quittin.1    Smokeless tobacco: Former User    Tobacco comment: smoked pipe regularly once a day but quit 10-15 yrs ago   Substance and Sexual Activity    Alcohol use: Yes     Frequency: 2-3 times a week     Drinks per session: 3 or 4     Binge frequency: Never     Comment: 2-3 glasses of wine weekly    Drug use: No    Sexual activity: Not on file       Review of Systems  Objective:     Vital Signs (Most Recent):  Temp: 97.6 °F (36.4 °C) (03/15/20 1200)  Pulse: 64 (03/15/20 1200)  Resp: (!) 30 (03/15/20 1200)  BP: (!) 151/92 (03/15/20 1100)  SpO2: (!) 93 % (03/15/20 1200) Vital Signs (24h Range):  Temp:  [97.6 °F (36.4 °C)-99.3 °F (37.4 °C)] 97.6 °F (36.4 °C)  Pulse:  [55-84] 64  Resp:  [19-45] 30  SpO2:  [85 %-96 %] 93 %  BP: (110-169)/() 151/92     Weight: 89 kg (196 lb 3.4 oz)  Body mass index is 28.98 kg/m².    Review of Symptoms  Symptom Assessment (ESAS 0-10 scale)   ESAS 0 1 2 3 4 5 6 7 8 9 10   Pain              Dyspnea              Anxiety              Nausea              Depression               Anorexia              Fatigue              Insomnia              Restlessness               Agitation              CAM / Delirium __ --  ___+   Constipation     __ --  ___+   Diarrhea           __ --  ___+  Bowel Management Plan (BMP): Yes    Comments: none    Pain Assessment:  0    OME in 24 hours: 0    Performance Status: 70      Physical Exam  Constitutional: He appears well-developed and well-nourished. No distress.   HENT:   Head: Normocephalic and atraumatic.   Nose: Nose normal.   HF02   Eyes: Pupils are equal, round, and reactive to light. EOM are normal.   Neck: Normal range of motion. Neck supple.   Cardiovascular: Exam reveals no friction rub.   No murmur heard.  Tachycardic and regular   Pulmonary/Chest: He is in respiratory distress.   Slight increase in WOB; coarse BS throughout   Abdominal: Soft. Bowel sounds are normal. He exhibits distension. He exhibits no mass. There is no tenderness. There is no rebound and no guarding.   Genitourinary:   Genitourinary Comments: Acuna with clear urine   Musculoskeletal: Normal range of motion.   Neurological: He is alert and oriented  Skin: Skin is warm and dry. Capillary refill takes less than 2 seconds. He is not diaphoretic.   Psychiatric:   Calm      Significant Labs:   Coagulation: No results for input(s): PT, INR, APTT in the last 48 hours.  Lactic acid:   Recent Labs   Lab 03/13/20 2023 03/14/20  0035   LACTATE 2.3* 0.9     LFT:   Recent Labs   Lab 03/15/20  0512   *   ALKPHOS 27*   BILITOT 0.8     CBC:   Recent Labs   Lab 03/15/20  0512   WBC 9.49   HGB 12.5*   HCT 38.0*   MCV 97        BMP:  Recent Labs   Lab 03/15/20  0512   *      K 3.1*      CO2 23   BUN 15   CREATININE 0.8   CALCIUM 9.2   MG 1.8     LFT:  Lab Results   Component Value Date     (H) 03/15/2020    ALKPHOS 27 (L) 03/15/2020    BILITOT 0.8 03/15/2020     Albumin:   Albumin   Date Value Ref Range Status   03/15/2020 2.4 (L) 3.5 - 5.2 g/dL Final     Protein:   Total Protein   Date Value Ref Range Status   03/15/2020 6.2 6.0 - 8.4 g/dL Final     Lactic acid:   Lab Results   Component Value Date    LACTATE 0.9 03/14/2020    LACTATE 2.3 (H) 03/13/2020       Significant Imaging: I have reviewed all pertinent imaging  results/findings within the past 24 hours.    Advance Care Planning   Advanced Directives::  Living Will: Yes. Copy on chart: No  LaPOST: No  Do Not Resuscitate Status: Yes; as of today's conversation  Medical Power of : Yes. Agent's Name: wife . Agent's Contact Number:   Linh Crowell Spouse [phone]         Decision-Making Capacity: Patient answered questions, Family answered questions       Living Arrangements: Lives with spouse at [facility]    Psychosocial/Cultural:  Patient's most important priorities:  receiving best chance to recover without burdensome and non beneficial treatments     Patient's biggest concerns/fears:  suffering    Previous death/end of life care history:  Father in law  a few years ago and family was torn    Patient's goals/hopes:  To get better    Spiritual: did not address today      > 50% of 46 min visit spent in chart review, face to face discussion of goals of care,  symptom assessment, coordination of care and emotional support.    Papa Marley MD  Palliative Medicine  [facility]

## 2020-03-15 NOTE — PT/OT/SLP PROGRESS
Speech Language Pathology      Taran Crowell  MRN: [id number]    Patient not seen today secondary to Unavailable (Comment), Other (Comment)(pt confimed positive for COVID 19).  Communicated this with MD.      Shana Saleh, CCC-SLP   3/15/2020

## 2020-03-15 NOTE — PLAN OF CARE
CMICU DAILY GOALS       A: Awake    RASS: Goal -  0 Alert and Calm  Actual -  0 Alert and Calm   Restraint necessity:  NA  B: Breath   SBT: Not intubated   C: Coordinate A & B, analgesics/sedatives   Pain: managed    SAT: Not intubated  D: Delirium   CAM-ICU: Overall CAM-ICU: Negative  E: Early Mobility   MOVE Screen: Fail   Activity: Activity Management: activity adjusted per tolerance  FAS: Feeding/Nutrition   Diet order: Diet/Nutrition Received: NPO, Specialty Diet/Nutrition Received: liquid level-honey(honey until cleared by Speech ) Fluid restriction:    T: Thrombus   DVT prophylaxis: VTE Required Core Measure: Pharmacological prophylaxis initiated/maintained  H: HOB Elevation   Head of Bed (HOB): HOB at 30 degrees  U: Ulcer Prophylaxis   GI: yes  G: Glucose control   managed    S: Skin   Bundle compliance: yes   Bathing/Skin Care: incontinence care, back care Date: 03/15/2020  B: Bowel Function   diarrhea   I: Indwelling Catheters   Acuna necessity:      Urethral Catheter 03/14/20 1700-Reason for Continuing Urinary Catheterization: Critically ill in ICU requiring intensive monitoring   CVC necessity: No   IPAD offered: Yes  D: De-escalation Antibx   No; Pt unstable  Plan for the day   Continue Comfort flow; Maintain O2 Sat greater than 88%  Family/Goals of care/Code Status   Code Status: DNR     Pt complained of SOB with O2 Sat of 85% and RR of 33; Hooked pt to comfort flow with FIO2 of 100%;  VS and assessment per flow sheet, patient progressing towards goals as tolerated, plan of care reviewed with Taran Crowell and family, all concerns addressed, will continue to monitor.

## 2020-03-15 NOTE — CARE UPDATE
Pt with sats dropping to mid 80s. MD at bedside. Orders given to place pt on comfort flow and titrate for sats >88%. Pt placed on 20L 100%. Sats recovered to 92%. Will continue to monitor pt closely.

## 2020-03-15 NOTE — SUBJECTIVE & OBJECTIVE
Interval History: discussed with ICU team, family and patient; requiring higher O2 levels on HFNC at 100% 30L.  Pt without specific complaints and feels he is actually feeling better.      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 No mass effect or hemorrhage. Additional remote lacunar type infarcts noted in this same region. Decreased signal in the distal right vertebral artery, suggesting hypoplasia or stenosis. The vertebral basilar system is left-sided dominant.    Chronic anticoagulation - on apixaban 5/29/2017    Essential tremor 5/29/2017    On Remeron    GERD (gastroesophageal reflux disease)     History of colon polyps 3/18/2014    Hypothyroidism due to acquired atrophy of thyroid 09/30/2015    Last on 2015.  None since then.    Memory loss last months.    Mixed hyperlipidemia 5/29/2017    Obesity     PAF (paroxysmal atrial fibrillation) 6/16/2015    Sleep apnea     BiPAP       Past Surgical History:   Procedure Laterality Date    COLONOSCOPY  6/23/2000  ([place])    Internal hemorrhoids, otherwise normal exam.    COLONOSCOPY W/ POLYPECTOMY  2/12/2010  [facility]    One less than 1 mm polyp in the distal sigmoid.  TUBULAR ADENOMA.    One less than 1 mm polyp in the cecum.  TUBULAR ADENOMA.    Non-bleeding internal hemorrhoids.       EYE SURGERY Bilateral     cataracts    UPPER GASTROINTESTINAL ENDOSCOPY  2/7/2007  (Dr. Bourgeois)    Grade 1 reflux esophagitis.  Small/medium hiatal hernia.  Pylorus erythema.    UPPER GASTROINTESTINAL ENDOSCOPY  2/12/2010  [facility]    Slight reflux esophagitis.  Z-line regular, 30 cm from the incisors.   Moderate / large hiatus hernia.  Normal stomach and duodenum.  SELAM Test  Negative.        Review of  patient's allergies indicates:  No Known Allergies    Medications:  Continuous Infusions:  Scheduled Meds:   apixaban  5 mg Oral BID    atorvastatin  40 mg Oral Daily    finasteride  5 mg Oral Daily    flecainide  50 mg Oral Q12H    fluticasone propionate  2 spray Each Nostril Daily    [START ON 3/16/2020] hydroxychloroquine  200 mg Oral BID    hydroxychloroquine  400 mg Oral BID    levothyroxine  75 mcg Oral Before breakfast    methylPREDNISolone sodium succinate  40 mg Intravenous Q12H    metoprolol tartrate  12.5 mg Oral BID    mirtazapine  15 mg Oral Nightly    pantoprazole  40 mg Oral Daily    piperacillin-tazobactam (ZOSYN) IVPB  4.5 g Intravenous Q8H    spironolactone  25 mg Oral Daily    tamsulosin  0.4 mg Oral Daily    vancomycin (VANCOCIN) IVPB  15 mg/kg Intravenous Q12H     PRN Meds:acetaminophen, Dextrose 10% Bolus, Dextrose 10% Bolus, glucagon (human recombinant), glucose, glucose, melatonin, ondansetron, promethazine, sodium chloride 0.9%, sodium chloride 0.9%    Family History     Problem Relation (Age of Onset)    Cancer Father        Tobacco Use    Smoking status: Former Smoker     Types: Pipe     Last attempt to quit: 2000     Years since quittin.1    Smokeless tobacco: Former User    Tobacco comment: smoked pipe regularly once a day but quit 10-15 yrs ago   Substance and Sexual Activity    Alcohol use: Yes     Frequency: 2-3 times a week     Drinks per session: 3 or 4     Binge frequency: Never     Comment: 2-3 glasses of wine weekly    Drug use: No    Sexual activity: Not on file       Review of Systems  Objective:     Vital Signs (Most Recent):  Temp: 97.6 °F (36.4 °C) (03/15/20 1200)  Pulse: 64 (03/15/20 1200)  Resp: (!) 30 (03/15/20 1200)  BP: (!) 151/92 (03/15/20 1100)  SpO2: (!) 93 % (03/15/20 1200) Vital Signs (24h Range):  Temp:  [97.6 °F (36.4 °C)-99.3 °F (37.4 °C)] 97.6 °F (36.4 °C)  Pulse:  [55-84] 64  Resp:  [19-45] 30  SpO2:  [85 %-96 %] 93 %  BP:  (110-169)/() 151/92     Weight: 89 kg (196 lb 3.4 oz)  Body mass index is 28.98 kg/m².    Review of Symptoms  Symptom Assessment (ESAS 0-10 scale)   ESAS 0 1 2 3 4 5 6 7 8 9 10   Pain              Dyspnea              Anxiety              Nausea              Depression               Anorexia              Fatigue              Insomnia              Restlessness               Agitation              CAM / Delirium __ --  ___+   Constipation     __ --  ___+   Diarrhea           __ --  ___+  Bowel Management Plan (BMP): Yes    Comments: none    Pain Assessment: 0    OME in 24 hours: 0    Performance Status: 70      Physical Exam    Significant Labs:   Coagulation: No results for input(s): PT, INR, APTT in the last 48 hours.  Lactic acid:   Recent Labs   Lab 03/13/20 2023 03/14/20  0035   LACTATE 2.3* 0.9     LFT:   Recent Labs   Lab 03/15/20  0512   *   ALKPHOS 27*   BILITOT 0.8     CBC:   Recent Labs   Lab 03/15/20  0512   WBC 9.49   HGB 12.5*   HCT 38.0*   MCV 97        BMP:  Recent Labs   Lab 03/15/20  0512   *      K 3.1*      CO2 23   BUN 15   CREATININE 0.8   CALCIUM 9.2   MG 1.8     LFT:  Lab Results   Component Value Date     (H) 03/15/2020    ALKPHOS 27 (L) 03/15/2020    BILITOT 0.8 03/15/2020     Albumin:   Albumin   Date Value Ref Range Status   03/15/2020 2.4 (L) 3.5 - 5.2 g/dL Final     Protein:   Total Protein   Date Value Ref Range Status   03/15/2020 6.2 6.0 - 8.4 g/dL Final     Lactic acid:   Lab Results   Component Value Date    LACTATE 0.9 03/14/2020    LACTATE 2.3 (H) 03/13/2020       Significant Imaging: I have reviewed all pertinent imaging results/findings within the past 24 hours.    Advance Care Planning   Advanced Directives::  Living Will: Yes. Copy on chart: No  LaPOST: No  Do Not Resuscitate Status: Yes; as of today's conversation  Medical Power of : Yes. Agent's Name: wife . Agent's Contact Number:   Linh Crowell Spouse [phone]    [phone]         Decision-Making Capacity: Patient answered questions, Family answered questions       Living Arrangements: Lives with spouse at [facility]    Psychosocial/Cultural:  Patient's most important priorities:  receiving best chance to recover without burdensome and non beneficial treatments     Patient's biggest concerns/fears:  suffering    Previous death/end of life care history:  Father in law  a few years ago and family was torn    Patient's goals/hopes:  To get better    Spiritual: did not address today

## 2020-03-15 NOTE — ASSESSMENT & PLAN NOTE
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 Mcintosh to the patient and family. Pt hopes for improvement but both he and the family are aware his condition remains critical and tenuous at best.  Discussed the treatment plan for the day, care plan for best and worse case scenarios.     The pt's wife also tested positive for Covid-19 and taking precautions to care for herself and minimize exposure to others.     Answered all questions.     7) Plan:   Plan:   code status to DNR in accordance to patient's values and previous declared end of life preferences.      Cont best supportive care available     Cont open communication with family based on patient status     In the event of clinical deterioration, the family and patient would want to avoid inappropriate prolongation of the dying process and aggressive symptom management.      Will remain available. Please call with questions.     Papa Marley MD  Palliative Medicine  [phone]

## 2020-03-15 NOTE — ASSESSMENT & PLAN NOTE
with Rhino virus positive and COVID -19 virus confirmed positive admitted to ICU service for acute hypoxemic respiratory failure requiring increase oxygen requirement. Patient is a resident of [facility] where there were positive cases of COVID-19 virus. Patient had fever of 102F and oxygen requiring 10L nasal canula in ICU service for close monitoring. Initial CXR showed RUL and LLL atelectasis seen. Most likely cause is viral bronchitis from COVID-19 and Rhinovirus versus bacterial PNA. Patient flu negative, blood culture NGTD, patient blood pressure WNL but tachy of >110.   - RIP 3/9 with rhinovirus/enterovirus    Plan    - Continue BS Abx (azithro/vanc and zosyn).  - oxygen supplement for o2 sats goal of 88-92%  - Airborne and droplet contact isolation   - Antiviral therapy to be discussed - He meets criteria for hydroxychloroquine ( the only limitation to remdesivir that he is not intubated although he is requiring 100% fio2)     Person with COVID-19  Presumptive case at his nursing home.  Recent international travel within 2 weeks of onset of symptoms, not to a high risk country  - COVID-19 testing positive  - [facility] #[id number]

## 2020-03-15 NOTE — SUBJECTIVE & OBJECTIVE
Interval History/Significant Events:in crease oxygen requirement overnight to 20 L and 100%    Review of Systems refer to attending note  Objective:     Vital Signs (Most Recent):  Temp: 97.6 °F (36.4 °C) (03/15/20 0700)  Pulse: 73 (03/15/20 1000)  Resp: (!) 22 (03/15/20 1000)  BP: (!) 159/128 (03/15/20 1000)  SpO2: (!) 93 % (03/15/20 1000) Vital Signs (24h Range):  Temp:  [97.6 °F (36.4 °C)-99.3 °F (37.4 °C)] 97.6 °F (36.4 °C)  Pulse:  [55-86] 73  Resp:  [19-45] 22  SpO2:  [85 %-96 %] 93 %  BP: (110-169)/() 159/128   Weight: 89 kg (196 lb 3.4 oz)  Body mass index is 28.98 kg/m².      Intake/Output Summary (Last 24 hours) at 3/15/2020 1141  Last data filed at 3/15/2020 1012  Gross per 24 hour   Intake 1185 ml   Output 1110 ml   Net 75 ml       Physical Exam refer to attending note    Vents:  Oxygen Concentration (%): 100 (03/15/20 1110)  Lines/Drains/Airways     Drain                 Urethral Catheter 03/14/20 1700 less than 1 day          Peripheral Intravenous Line                 Peripheral IV - Single Lumen 03/13/20 2018 20 G Right Antecubital 1 day         Peripheral IV - Single Lumen 03/13/20 2019 22 G Left Hand 1 day         Peripheral IV - Single Lumen 03/15/20 1000 Posterior;Right Hand less than 1 day              Significant Labs:    CBC/Anemia Profile:  Recent Labs   Lab 03/13/20 2023 03/14/20  0304 03/15/20  0512   WBC 8.60  8.60 10.44 9.49   HGB 12.3*  12.3* 12.1* 12.5*   HCT 38.5*  38.5* 38.0* 38.0*   *  141* 150 181   MCV 98  98 98 97   RDW 14.2  14.2 14.2 14.2        Chemistries:  Recent Labs   Lab 03/13/20 2023 03/14/20  0304 03/15/20  0512    136 140   K 3.8 4.2 3.1*    102 103   CO2 22* 23 23   BUN 15 14 15   CREATININE 1.0 0.8 0.8   CALCIUM 8.9 8.8 9.2   ALBUMIN 2.9* 2.7* 2.4*   PROT 6.5 6.2 6.2   BILITOT 0.6 0.5 0.8   ALKPHOS 29* 26* 27*   ALT 79* 73* 96*   * 130* 166*   MG 1.5* 1.9 1.8   PHOS 1.7* 3.4 3.3       All pertinent labs within the past 24 hours  have been reviewed.    Significant Imaging:  I have reviewed and interpreted all pertinent imaging results/findings within the past 24 hours.

## 2020-03-15 NOTE — CARE UPDATE
Day 5 of admission:      STACIE score 17  Risk factor 5            Noah Lares MD  Internal Medicine  PGY-3

## 2020-03-15 NOTE — PROGRESS NOTES
[facility]  Critical Care Medicine  Progress Note    Patient Name: Taran Crowell  MRN: [id number]  Admission Date: 3/11/2020  Hospital Length of Stay: 4 days  Code Status: DNR  Attending Provider: Brijesh Mcintosh MD  Primary Care Provider: Kemi Lopez MD   Principal Problem: Acute hypoxemic respiratory failure    Subjective:     HPI:  Mr. Taran Crowell is a 80 y.o. 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 Patient resides at [facility] which has confirmed COVID -19 case. Patient has recent travel history which about 3 weeks ago patient went on a [place] cruise around the [facility]. Patient's wife is also ill with mild cold like symptoms.      History mostly collected from the medical chart. Patient reports onset of symptoms approximately 2 weeks ago he began to develop weakness, severe progressive fatigue, a cough productive of clear sputum, decreased appetite, and overall malaise.  Over the last 2 days, he has developed intermittent fevers and chills, with home temperature reportedly 103 F. He became so weak that he can barely walk, he denies residual deficits from his stroke but did say his initial symptoms were ataxia. Endorses nausea and diarrhea (<4 BM/day) for the last 2 weeks, as well as decreased appetite. Patient denied rhinorrhea, sore throat, eye discharge, ear ache and post-nasal drip. Patient had an ED visit on 03/09 where he was positive for human rhinovirus.  He was prescribed Augmentin and reports taking this as prescribed.      In the emergency department, he was initially hypoxemic on ambient air, temp 99.5, and mildly tachypneic with respiratory rate of 20.  His hypoxemia improved with 2 L of supplemental oxygen.  He was given 1 L of saline and ceftriaxone,  azithromycin. Patient was admitted to hospital medicine team and COVID-19 testing confirmed positive. On 3/13 evening patient ambulated to go to bathroom and had a minor fall which increased his oxygen requirement to 10LNC and saturation dip below 90%. Patient also has fever of 102 and transfer to MICU.     [facility]ospital[facility]ICU Course:  [facility] and febrile illness in the setting of rhino virus positive RIP  and COVID-19 positive. Patient started on Azithromycin and Ceftriaxone for lower respiratory infection. Evening of 3/13/20, patient had a fall ambulating alone to the bathroom and fell. Patient oxygen requirement elevated to 10L so patient was transfer to ICU for closer monitoring.     Interval History/Significant Events:in crease oxygen requirement overnight to 20 L and 100%    Review of Systems refer to attending note  Objective:     Vital Signs (Most Recent):  Temp: 97.6 °F (36.4 °C) (03/15/20 0700)  Pulse: 73 (03/15/20 1000)  Resp: (!) 22 (03/15/20 1000)  BP: (!) 159/128 (03/15/20 1000)  SpO2: (!) 93 % (03/15/20 1000) Vital Signs (24h Range):  Temp:  [97.6 °F (36.4 °C)-99.3 °F (37.4 °C)] 97.6 °F (36.4 °C)  Pulse:  [55-86] 73  Resp:  [19-45] 22  SpO2:  [85 %-96 %] 93 %  BP: (110-169)/() 159/128   Weight: 89 kg (196 lb 3.4 oz)  Body mass index is 28.98 kg/m².      Intake/Output Summary (Last 24 hours) at 3/15/2020 1141  Last data filed at 3/15/2020 1012  Gross per 24 hour   Intake 1185 ml   Output 1110 ml   Net 75 ml       Physical Exam refer to attending note    Vents:  Oxygen Concentration (%): 100 (03/15/20 1110)  Lines/Drains/Airways     Drain                 Urethral Catheter 03/14/20 1700 less than 1 day          Peripheral Intravenous Line                 Peripheral IV - Single Lumen 03/13/20 2018 20 G Right Antecubital 1 day         Peripheral IV - Single Lumen 03/13/20 2019 22 G Left Hand 1 day         Peripheral  IV - Single Lumen 03/15/20 1000 Posterior;Right Hand less than 1 day              Significant Labs:    CBC/Anemia Profile:  Recent Labs   Lab 03/13/20  2023 03/14/20  0304 03/15/20  0512   WBC 8.60  8.60 10.44 9.49   HGB 12.3*  12.3* 12.1* 12.5*   HCT 38.5*  38.5* 38.0* 38.0*   *  141* 150 181   MCV 98  98 98 97   RDW 14.2  14.2 14.2 14.2        Chemistries:  Recent Labs   Lab 03/13/20  2023 03/14/20  0304 03/15/20  0512    136 140   K 3.8 4.2 3.1*    102 103   CO2 22* 23 23   BUN 15 14 15   CREATININE 1.0 0.8 0.8   CALCIUM 8.9 8.8 9.2   ALBUMIN 2.9* 2.7* 2.4*   PROT 6.5 6.2 6.2   BILITOT 0.6 0.5 0.8   ALKPHOS 29* 26* 27*   ALT 79* 73* 96*   * 130* 166*   MG 1.5* 1.9 1.8   PHOS 1.7* 3.4 3.3       All pertinent labs within the past 24 hours have been reviewed.    Significant Imaging:  I have reviewed and interpreted all pertinent imaging results/findings within the past 24 hours.      ABG  Recent Labs   Lab 03/13/20 1946   PH 7.498*   PO2 56*   PCO2 23.0*   HCO3 17.8*   BE -5     Assessment/Plan:     Neuro  History of stroke  Currently no residual signs or symptoms  - continue anticoagulation and see statin as above    Essential tremor  On remeron    Pulmonary  * Acute hypoxemic respiratory failure   with Rhino virus positive and COVID -19 virus confirmed positive admitted to ICU service for acute hypoxemic respiratory failure requiring increase oxygen requirement. Patient is a resident of [facility] where there were positive cases of COVID-19 virus. Patient had fever of 102F and oxygen requiring 10L nasal canula in ICU service for close monitoring. Initial CXR showed RUL and LLL atelectasis seen. Most likely cause is viral bronchitis from COVID-19 and Rhinovirus versus bacterial PNA. Patient flu negative, blood culture NGTD, patient blood pressure WNL but tachy of >110.   - RIP 3/9 with rhinovirus/enterovirus    Plan    - Continue BS Abx (azithro/vanc and zosyn).  -  oxygen supplement for o2 sats goal of 88-92%  - Airborne and droplet contact isolation   - Antiviral therapy to be discussed - He meets criteria for hydroxychloroquine ( the only limitation to remdesivir that he is not intubated although he is requiring 100% fio2)     Person with COVID-19  Presumptive case at his nursing home.  Recent international travel within 2 weeks of onset of symptoms, not to a high risk country  - COVID-19 testing positive  - [facility] #[id number]          Cardiac/Vascular  Mixed hyperlipidemia  Continue home statin     PAF (paroxysmal atrial fibrillation)  Patient with history of A-Fib on home Eliquis, Flecainide and Metoprolol. Current in NSR     Plan  - continue apixaban 5 mg BID  - continue flecainide 50 mg BID  - continue metoprolol tartrate 12.5 mg BID  - ECG 3/12 NSR with QTc 423    Renal/  BPH (benign prostatic hyperplasia)  -Continue home finasteride   -Continue home tamsulosin     ID  Rhinovirus  - see acute respiratory failure     Hematology  Chronic anticoagulation - on apixaban  - Continue with Apixaban for A-Fib     Endocrine  Hypothyroidism  TSH level with in normal.   - Continue synthroid 75mcg     GI  Gastroesophageal reflux disease without esophagitis  Continue PPI 40mg Daily     Other  Palliative care by specialist  Appreciate palliative care help.    Fever  Most likely due to Viral vs bacterial infection   - Tylenol 650mg PRN for fever greater than 101   - Blood culture 3/13 drawn     Complex sleep apnea syndrome  Will limit CPAP and BiPAP use with confirmed COVID -19 confirmed virus.           Critical secondary to Patient has a condition that poses threat to life and bodily function: Severe Respiratory Distress      Critical care was time spent personally by me on the following activities: development of treatment plan with patient or surrogate and bedside caregivers, discussions with consultants, evaluation of patient's response to treatment, examination of patient,  ordering and performing treatments and interventions, ordering and review of laboratory studies, ordering and review of radiographic studies, pulse oximetry, re-evaluation of patient's condition. This critical care time did not overlap with that of any other provider or involve time for any procedures.     Noah Lares MD  Critical Care Medicine  [facility]

## 2020-03-16 PROBLEM — G93.40 ACUTE ENCEPHALOPATHY: Status: ACTIVE | Noted: 2020-01-01

## 2020-03-16 NOTE — ASSESSMENT & PLAN NOTE
80 male highly functional male with h/o CVA admitted with hypoxemic respiratory failure due to COVID-19 and multilobar PNA    1) Symptoms: dyspnea: ongoing supportive care; for refractory symptoms consider low dose morphine 2 mg q 1 hour prn for breathrough; start MS Contin 15 mg bid; fan in room; cooler air temps    2) Code status: DNR/DNI placed on chart based on pt's values and treatment recommendations;     3) Psychosocial: highly functional, still teaching law; lived and functioned independently; 4 adult children lvies locally ([place]-daughter- is an NP in urgent care/occupational health/former oncology nurse)    4) Medicolegal: has AD/LW (placed on chart); wife is HCPOA    5) Spiritual: [employer]  visited last night for anointing of the sick    6) Goals of care/discussion:  Updated with Dr. Proctor to the patient and family. Pt hopes for improvement but both he and the family are aware his condition remains critical and tenuous at best.  Discussed the treatment plan for the day, care plan for best and worse case scenarios. Updated both the wife and daughter by phone today.  They agree with the supportive use of opioids for otherwise treatment refractory dyspnea.  In the even of continued decline, they would want to focus on comfort based approach.     The pt's wife also tested positive for Covid-19 and taking precautions to care for herself and minimize exposure to others.  She continues to feel well despite diagnosis    Answered all questions.     7) Plan:   Plan:   code status to DNR in accordance to patient's values and previous declared end of life preferences.      Cont best supportive care available     Cont open communication with family based on patient status     In the event of clinical deterioration, the family and patient would want to avoid inappropriate prolongation of the dying process and aggressive symptom management.      Will remain available. Please call with questions.     Papa  MD Donell  Palliative Medicine  [phone]

## 2020-03-16 NOTE — EICU
Rounding (Video Assessment):  Yes    Intervention Initiated From:  [facility] / EICU    Comments: Video assessment done at this time. Care provider with patient at this time; patient confused; currenlty on comfort flow with O2 sats 90%.

## 2020-03-16 NOTE — SUBJECTIVE & OBJECTIVE
Interval History/Significant Events: Agitated overnight.  Did not respond well with Haldol or precedex infusion.  Responded to morphine IVP.       Review of Systems   Unable to perform ROS: Mental status change     Objective:     Vital Signs (Most Recent):  Temp: 97.5 °F (36.4 °C) (03/16/20 1500)  Pulse: 64 (03/16/20 1500)  Resp: (!) 42 (03/16/20 1500)  BP: 133/71 (03/16/20 1500)  SpO2: (!) 91 % (03/16/20 1500) Vital Signs (24h Range):  Temp:  [97 °F (36.1 °C)-98.7 °F (37.1 °C)] 97.5 °F (36.4 °C)  Pulse:  [38-71] 64  Resp:  [] 42  SpO2:  [78 %-97 %] 91 %  BP: ()/() 133/71   Weight: 89 kg (196 lb 3.4 oz)  Body mass index is 28.98 kg/m².      Intake/Output Summary (Last 24 hours) at 3/16/2020 1548  Last data filed at 3/16/2020 1500  Gross per 24 hour   Intake 1561.85 ml   Output 785 ml   Net 776.85 ml       Physical Exam   Constitutional: He appears well-developed. No distress.   HENT:   Head: Normocephalic.   Eyes:   Round and equal.  Eyes midline, no eye deviation or roving eye movements   Cardiovascular: Bradycardia present.   No murmur heard.  Warm extremities, no peripheral edema   Pulmonary/Chest: No accessory muscle usage. Tachypnea noted. He has no wheezes. He has rales.   HFNC   Abdominal: Soft. Bowel sounds are normal. There is no tenderness.   Genitourinary:   Genitourinary Comments: Urine catheter   Neurological: GCS eye subscore is 4. GCS verbal subscore is 4. GCS motor subscore is 6.   Awake, alert, disoriented to place and time.  Confused in conversation. Following request and moving all extremities equally and symmetrically.    Skin: Skin is warm and dry. He is not diaphoretic.   Nursing note and vitals reviewed.      Vents:  Oxygen Concentration (%): 100 (03/16/20 1500)  Lines/Drains/Airways     Drain                 Urethral Catheter 03/14/20 1700 1 day          Peripheral Intravenous Line                 Peripheral IV - Single Lumen 03/13/20 2018 20 G Right Antecubital 2 days          Peripheral IV - Double Lumen 03/16/20 0200 20 G Anterior;Left Hand less than 1 day              Significant Labs:    CBC/Anemia Profile:  Recent Labs   Lab 03/15/20  0512 03/16/20  0340   WBC 9.49 15.41*   HGB 12.5* 13.0*   HCT 38.0* 39.9*    241   MCV 97 97   RDW 14.2 13.9        Chemistries:  Recent Labs   Lab 03/15/20  0512 03/16/20  0340    139   K 3.1* 3.7    103   CO2 23 23   BUN 15 28*   CREATININE 0.8 1.0   CALCIUM 9.2 8.7   ALBUMIN 2.4* 2.5*   PROT 6.2 6.5   BILITOT 0.8 0.7   ALKPHOS 27* 36*   ALT 96* 200*   * 254*   MG 1.8 2.1   PHOS 3.3 4.1       All pertinent labs within the past 24 hours have been reviewed.    Significant Imaging:  I have reviewed and interpreted all pertinent imaging results/findings within the past 24 hours.

## 2020-03-16 NOTE — EICU
Rounding (Video Assessment):  Yes    Intervention Initiated From:  [facility] / EICU    Comments: Video assessment done at this time. Patient resting with family member at bedside. O2 sats 90% on comfort flow. No distress noted, patient calm.

## 2020-03-16 NOTE — SUBJECTIVE & OBJECTIVE
Interval History: discussed with ICU team, family and patient; continues to require O2 levels on HFNC at 100% 30L.  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 No mass effect or hemorrhage. Additional remote lacunar type infarcts noted in this same region. Decreased signal in the distal right vertebral artery, suggesting hypoplasia or stenosis. The vertebral basilar system is left-sided dominant.    Chronic anticoagulation - on apixaban 5/29/2017    Essential tremor 5/29/2017    On Remeron    GERD (gastroesophageal reflux disease)     History of colon polyps 3/18/2014    Hypothyroidism due to acquired atrophy of thyroid 09/30/2015    Last on 2015.  None since then.    Memory loss last months.    Mixed hyperlipidemia 5/29/2017    Obesity     PAF (paroxysmal atrial fibrillation) 6/16/2015    Sleep apnea     BiPAP       Past Surgical History:   Procedure Laterality Date    COLON[place]  6/23/2000  ([place])    Internal hemorrhoids, otherwise normal exam.    COLONOSCOPY W/ POLYPECTOMY  2/12/2010  [facility]    One less than 1 mm polyp in the distal sigmoid.  TUBULAR ADENOMA.    One less than 1 mm polyp in the cecum.  TUBULAR ADENOMA.    Non-bleeding internal hemorrhoids.       EYE SURGERY Bilateral     cataracts    UPPER GASTROINTESTINAL ENDOSCOPY  2/7/2007  (Dr. Bourgeois)    Grade 1 reflux esophagitis.  Small/medium hiatal hernia.  Pylorus erythema.    UPPER GASTROINTESTINAL ENDOSCOPY  2/12/2010  [facility]    Slight reflux esophagitis.  Z-line regular, 30 cm  from the incisors.   Moderate / large hiatus hernia.  Normal stomach and duodenum.  SELAM Test  Negative.        Review of patient's allergies indicates:  No Known Allergies    Medications:  Continuous Infusions:   dexmedetomidine (PRECEDEX) infusion Stopped (20 1000)     Scheduled Meds:   apixaban  5 mg Oral BID    atorvastatin  40 mg Oral Daily    finasteride  5 mg Oral Daily    flecainide  50 mg Oral Q12H    fluticasone propionate  2 spray Each Nostril Daily    hydroxychloroquine  200 mg Oral BID    levothyroxine  75 mcg Oral Before breakfast    methylPREDNISolone sodium succinate  40 mg Intravenous Q12H    metoprolol tartrate  12.5 mg Oral BID    mirtazapine  15 mg Oral Nightly    morphine  15 mg Oral Q12H    piperacillin-tazobactam (ZOSYN) IVPB  4.5 g Intravenous Q8H    tamsulosin  0.4 mg Oral Daily     PRN Meds:acetaminophen, Dextrose 10% Bolus, Dextrose 10% Bolus, glucagon (human recombinant), glucose, glucose, melatonin, morphine, ondansetron, promethazine, sodium chloride 0.9%, sodium chloride 0.9%    Family History     Problem Relation (Age of Onset)    Cancer Father        Tobacco Use    Smoking status: Former Smoker     Types: Pipe     Last attempt to quit: 2000     Years since quittin.1    Smokeless tobacco: Former User    Tobacco comment: smoked pipe regularly once a day but quit 10-15 yrs ago   Substance and Sexual Activity    Alcohol use: Yes     Frequency: 2-3 times a week     Drinks per session: 3 or 4     Binge frequency: Never     Comment: 2-3 glasses of wine weekly    Drug use: No    Sexual activity: Not on file       Review of Systems   Unable to perform ROS: Acuity of condition     Objective:     Vital Signs (Most Recent):  Temp: 97.5 °F (36.4 °C) (20 1100)  Pulse: 71 (20 1300)  Resp: (!) 107 (20 1300)  BP: (!) 84/51 (20 1300)  SpO2: (!) 86 % (20 1300) Vital Signs (24h Range):  Temp:  [97 °F (36.1 °C)-98.7 °F (37.1 °C)] 97.5 °F  (36.4 °C)  Pulse:  [38-71] 71  Resp:  [] 107  SpO2:  [78 %-97 %] 86 %  BP: ()/() 84/51     Weight: 89 kg (196 lb 3.4 oz)  Body mass index is 28.98 kg/m².    Review of Symptoms  Unable to assess due to acuity of condition  Symptom Assessment (ESAS 0-10 scale)   ESAS 0 1 2 3 4 5 6 7 8 9 10   Pain              Dyspnea              Anxiety              Nausea              Depression               Anorexia              Fatigue              Insomnia              Restlessness               Agitation              CAM / Delirium __ --  ___+   Constipation     __ --  ___+   Diarrhea           __ --  ___+  Bowel Management Plan (BMP): Yes    Comments: none    Pain Assessment: 0    OME in 24 hours: 0    Performance Status: 70      Physical Exam   Constitutional: He appears well-developed and well-nourished. No distress.   HENT:   Head: Normocephalic and atraumatic.   Nose: Nose normal.   HF02   Eyes: Pupils are equal, round, and reactive to light. EOM are normal.   Neck: Normal range of motion. Neck supple.   Cardiovascular: Exam reveals no friction rub.   No murmur heard.  Tachycardic and regular   Pulmonary/Chest: He is in respiratory distress.   Slight increase in WOB; coarse BS throughout   Abdominal: Soft. Bowel sounds are normal. He exhibits distension. He exhibits no mass. There is no tenderness. There is no rebound and no guarding.   Genitourinary:   Genitourinary Comments: Acuna with clear urine   Musculoskeletal: Normal range of motion.   Neurological: He is alert.   Confused today   Skin: Skin is warm and dry. Capillary refill takes less than 2 seconds. He is not diaphoretic.   Psychiatric:   Calm        Significant Labs:   Coagulation: No results for input(s): PT, INR, APTT in the last 48 hours.  Lactic acid:   No results for input(s): LACTATE in the last 48 hours.  LFT:   Recent Labs   Lab 03/16/20  0340   *   ALKPHOS 36*   BILITOT 0.7     CBC:   Recent Labs   Lab 03/16/20  0340   WBC  15.41*   HGB 13.0*   HCT 39.9*   MCV 97        BMP:  Recent Labs   Lab 20  0340   *      K 3.7      CO2 23   BUN 28*   CREATININE 1.0   CALCIUM 8.7   MG 2.1     LFT:  Lab Results   Component Value Date     (H) 2020    ALKPHOS 36 (L) 2020    BILITOT 0.7 2020     Albumin:   Albumin   Date Value Ref Range Status   2020 2.5 (L) 3.5 - 5.2 g/dL Final     Protein:   Total Protein   Date Value Ref Range Status   2020 6.5 6.0 - 8.4 g/dL Final     Lactic acid:   Lab Results   Component Value Date    LACTATE 0.9 2020    LACTATE 2.3 (H) 2020       Significant Imaging: I have reviewed all pertinent imaging results/findings within the past 24 hours.    Advance Care Planning   Advanced Directives::  Living Will: Yes. Copy on chart: No  LaPOST: No  Do Not Resuscitate Status: Yes; as of today's conversation  Medical Power of : Yes. Agent's Name: wife . Agent's Contact Number:   Linh Crowell Spouse [phone]         Decision-Making Capacity: Patient answered questions, Family answered questions       Living Arrangements: Lives with [facility] at [facility]    Psychosocial/Cultural:  Patient's most important priorities:  receiving best chance to recover without burdensome and non beneficial treatments     Patient's biggest concerns/fears:  suffering    Previous death/end of life care history:  Father in law  a few years ago and family was torn    Patient's goals/hopes:  To get better    Spiritual: did not address today

## 2020-03-16 NOTE — PLAN OF CARE
"POC reviewed with pt and family at 1400. Pt verbalized understanding. Questions and concerns addressed. No acute events today. Pt is now oriented but frequently states that there are "bugs on the walls". HR remained >40bpm after stopping Precedex. Pt on high comfort flow NC @ 40 L, 100% fio2. Acuna in place. Lasix  x1. Pt progressing toward goals. Will continue to monitor. See flowsheets for full assessment and VS info.     "

## 2020-03-16 NOTE — PROGRESS NOTES
[facility]  Palliative Medicine  Progress Note    Patient Name: Taran Crowell  MRN: [id number]  Admission Date: 3/11/2020  Hospital Length of Stay: 5 days  Code Status: DNR   Attending Provider: Shelby Proctor MD  Consulting Provider: Papa Marley MD  Primary Care Physician: Kemi Lopez MD  Principal Problem:Acute hypoxemic respiratory failure    Patient information was obtained from notes and medicdal team.      Assessment/Plan:     * Acute hypoxemic respiratory failure  Ongoing supportive care    ACP (advance care planning)  Obtained copy of advance directives and placed on chart    Palliative care by specialist  80 male highly functional male with h/o CVA admitted with hypoxemic respiratory failure due to COVID-19 and multilobar PNA    1) Symptoms: dyspnea: ongoing supportive care; for refractory symptoms consider low dose morphine 2 mg q 1 hour prn for breathrough; start MS Contin 15 mg bid; fan in room; cooler air temps    2) Code status: DNR/DNI placed on chart based on pt's values and treatment recommendations;     3) Psychosocial: highly functional, still teaching law; lived and functioned independently; 4 adult children lvies locally ([place]-daughter- is an NP in urgent care/occupational health/former oncology nurse)    4) Medicolegal: has AD/LW (placed on chart); wife is HCPOA    5) Spiritual: Hinduism-[employer]uit  visited last night for anointing of the sick    6) Goals of care/discussion:  Updated with Dr. Proctor to the patient and family. Pt hopes for improvement but both he and the family are aware his condition remains critical and tenuous at best.  Discussed the treatment plan for the day, care plan for best and worse case scenarios. Updated both the wife and daughter by phone today.  They agree with the supportive use of opioids for otherwise treatment refractory dyspnea.  In the even of continued decline, they would want to focus on comfort based approach.     The pt's wife  also tested positive for Covid-19 and taking precautions to care for herself and minimize exposure to others.  She continues to feel well despite diagnosis    Answered all questions.     7) Plan:   Plan:   code status to DNR in accordance to patient's values and previous declared end of life preferences.      Cont best supportive care available     Cont open communication with family based on patient status     In the event of clinical deterioration, the family and patient would want to avoid inappropriate prolongation of the dying process and aggressive symptom management.      Will remain available. Please call with questions.     Papa Marley MD  Palliative Medicine  [phone]          I will follow-up with patient. Please contact us if you have any additional questions.    Subjective:     Chief Complaint:   Chief Complaint   Patient presents with    Fever     exposed to Coronavirus at [facility]; here Monday, 103 temp today       HPI:   Mr. Taran Crowell is a 80 y.o. 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 Patient resides at [facility] which has confirmed COVID -19 case. Patient has recent travel history which about 3 weeks ago patient went on a [place] cruise around the [place]. Patient's wife is also ill with mild cold like symptoms.      History mostly collected from the medical chart. Patient reports onset of symptoms approximately 2 weeks ago he began to develop weakness, severe progressive fatigue, a cough productive of clear sputum, decreased appetite, and overall malaise.  Over the last 2 days, he has developed intermittent fevers and chills, with home temperature reportedly 103 F. He became so weak that he can barely walk, he denies residual deficits from his stroke but did say his initial  symptoms were ataxia. Endorses nausea and diarrhea (<4 BM/day) for the last 2 weeks, as well as decreased appetite. Patient denied rhinorrhea, sore throat, eye discharge, ear ache and post-nasal drip. Patient had an ED visit on 03/09 where he was positive for human rhinovirus.  He was prescribed Augmentin and reports taking this as prescribed.      In the emergency department, he was initially hypoxemic on ambient air, temp 99.5, and mildly tachypneic with respiratory rate of 20.  His hypoxemia improved with 2 L of supplemental oxygen.  He was given 1 L of saline and ceftriaxone, azithromycin. Patient was admitted to hospital medicine team and COVID-19 testing confirmed positive. On 3/13 evening patient ambulated to go to bathroom and had a minor fall which increased his oxygen requirement to 10LNC and saturation dip below 90%. Patient also has fever of 102 and transfer to MICU.      Hospital/ICU Course:  Mr. Crowell was  initally Hospital Medicine for management of acute hypoxemic respiratory failure and febrile illness in the setting of rhino virus positive RIP  and COVID-19 positive. Patient started on Azithromycin and Ceftriaxone for lower respiratory infection. Evening of 3/13/20, patient had a fall ambulating alone to the bathroom and fell. Patient oxygen requirement elevated to 10L so patient was transfer to ICU for closer monitoring.     In this setting, palliative medicine was consulted to help with medical decision making and aid in the formation of goals of care.       Hospital Course:  No notes on file    Interval History: discussed with ICU team, family and patient; continues to require O2 levels on HFNC at 100% 30L.  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 No mass effect or hemorrhage. Additional remote lacunar type infarcts noted in this same region. Decreased signal in the distal right vertebral artery, suggesting hypoplasia or stenosis. The vertebral basilar system is left-sided dominant.    Chronic anticoagulation - on apixaban 5/29/2017    Essential tremor 5/29/2017    On Remeron    GERD (gastroesophageal reflux disease)     History of colon polyps 3/18/2014    Hypothyroidism due to acquired atrophy of thyroid 09/30/2015    Last on 2015.  None since then.    Memory loss last months.    Mixed hyperlipidemia 5/29/2017    Obesity     PAF (paroxysmal atrial fibrillation) 6/16/2015    Sleep apnea     BiPAP       Past Surgical History:   Procedure Laterality Date    COLONOSCOPY  6/23/2000  ([place])    Internal hemorrhoids, otherwise normal exam.    COLONOSCOPY W/ POLYPECTOMY  2/12/2010  [facility]    One less than 1 mm polyp in the distal sigmoid.  TUBULAR ADENOMA.    One less than 1 mm polyp in the cecum.  TUBULAR ADENOMA.    Non-bleeding internal hemorrhoids.       EYE SURGERY Bilateral     cataracts    UPPER GASTROINTESTINAL ENDOSCOPY  2/7/2007  (Dr. Bourgeois)    Grade 1 reflux esophagitis.  Small/medium hiatal hernia.  Pylorus erythema.    UPPER GASTROINTESTINAL ENDOSCOPY  2/12/2010  [facility]    Slight reflux esophagitis.  Z-line regular, 30 cm from the incisors.   Moderate / large hiatus hernia.  Normal stomach and duodenum.  SELAM Test  Negative.        Review of patient's allergies indicates:  No Known Allergies    Medications:  Continuous Infusions:   dexmedetomidine (PRECEDEX) infusion Stopped (03/16/20 1000)     Scheduled Meds:   apixaban  5 mg Oral BID    atorvastatin  40 mg Oral Daily    finasteride  5 mg Oral Daily    flecainide  50  mg Oral Q12H    fluticasone propionate  2 spray Each Nostril Daily    hydroxychloroquine  200 mg Oral BID    levothyroxine  75 mcg Oral Before breakfast    methylPREDNISolone sodium succinate  40 mg Intravenous Q12H    metoprolol tartrate  12.5 mg Oral BID    mirtazapine  15 mg Oral Nightly    morphine  15 mg Oral Q12H    piperacillin-tazobactam (ZOSYN) IVPB  4.5 g Intravenous Q8H    tamsulosin  0.4 mg Oral Daily     PRN Meds:acetaminophen, Dextrose 10% Bolus, Dextrose 10% Bolus, glucagon (human recombinant), glucose, glucose, melatonin, morphine, ondansetron, promethazine, sodium chloride 0.9%, sodium chloride 0.9%    Family History     Problem Relation (Age of Onset)    Cancer Father        Tobacco Use    Smoking status: Former Smoker     Types: Pipe     Last attempt to quit: 2000     Years since quittin.1    Smokeless tobacco: Former User    Tobacco comment: smoked pipe regularly once a day but quit 10-15 yrs ago   Substance and Sexual Activity    Alcohol use: Yes     Frequency: 2-3 times a week     Drinks per session: 3 or 4     Binge frequency: Never     Comment: 2-3 glasses of wine weekly    Drug use: No    Sexual activity: Not on file       Review of Systems   Unable to perform ROS: Acuity of condition     Objective:     Vital Signs (Most Recent):  Temp: 97.5 °F (36.4 °C) (20 1100)  Pulse: 71 (20 1300)  Resp: (!) 107 (20 1300)  BP: (!) 84/51 (20 1300)  SpO2: (!) 86 % (20 1300) Vital Signs (24h Range):  Temp:  [97 °F (36.1 °C)-98.7 °F (37.1 °C)] 97.5 °F (36.4 °C)  Pulse:  [38-71] 71  Resp:  [] 107  SpO2:  [78 %-97 %] 86 %  BP: ()/() 84/51     Weight: 89 kg (196 lb 3.4 oz)  Body mass index is 28.98 kg/m².    Review of Symptoms  Unable to assess due to acuity of condition  Symptom Assessment (ESAS 0-10 scale)   ESAS 0 1 2 3 4 5 6 7 8 9 10   Pain              Dyspnea              Anxiety              Nausea              Depression                Anorexia              Fatigue              Insomnia              Restlessness               Agitation              CAM / Delirium __ --  ___+   Constipation     __ --  ___+   Diarrhea           __ --  ___+  Bowel Management Plan (BMP): Yes    Comments: none    Pain Assessment: 0    OME in 24 hours: 0    Performance Status: 70      Physical Exam   Constitutional: He appears well-developed and well-nourished. No distress.   HENT:   Head: Normocephalic and atraumatic.   Nose: Nose normal.   HF02   Eyes: Pupils are equal, round, and reactive to light. EOM are normal.   Neck: Normal range of motion. Neck supple.   Cardiovascular: Exam reveals no friction rub.   No murmur heard.  Tachycardic and regular   Pulmonary/Chest: He is in respiratory distress.   Slight increase in WOB; coarse BS throughout   Abdominal: Soft. Bowel sounds are normal. He exhibits distension. He exhibits no mass. There is no tenderness. There is no rebound and no guarding.   Genitourinary:   Genitourinary Comments: Acuna with clear urine   Musculoskeletal: Normal range of motion.   Neurological: He is alert.   Confused today   Skin: Skin is warm and dry. Capillary refill takes less than 2 seconds. He is not diaphoretic.   Psychiatric:   Calm        Significant Labs:   Coagulation: No results for input(s): PT, INR, APTT in the last 48 hours.  Lactic acid:   No results for input(s): LACTATE in the last 48 hours.  LFT:   Recent Labs   Lab 03/16/20  0340   *   ALKPHOS 36*   BILITOT 0.7     CBC:   Recent Labs   Lab 03/16/20  0340   WBC 15.41*   HGB 13.0*   HCT 39.9*   MCV 97        BMP:  Recent Labs   Lab 03/16/20  0340   *      K 3.7      CO2 23   BUN 28*   CREATININE 1.0   CALCIUM 8.7   MG 2.1     LFT:  Lab Results   Component Value Date     (H) 03/16/2020    ALKPHOS 36 (L) 03/16/2020    BILITOT 0.7 03/16/2020     Albumin:   Albumin   Date Value Ref Range Status   03/16/2020 2.5 (L) 3.5 - 5.2 g/dL Final      Protein:   Total Protein   Date Value Ref Range Status   2020 6.5 6.0 - 8.4 g/dL Final     Lactic acid:   Lab Results   Component Value Date    LACTATE 0.9 2020    LACTATE 2.3 (H) 2020       Significant Imaging: I have reviewed all pertinent imaging results/findings within the past 24 hours.    Advance Care Planning   Advanced Directives::  Living Will: Yes. Copy on chart: No  LaPOST: No  Do Not Resuscitate Status: Yes; as of today's conversation  Medical Power of : Yes. Agent's Name: wife . Agent's Contact Number:   Linh Crowell Spouse [phone]         Decision-Making Capacity: Patient answered questions, Family answered questions       Living Arrangements: Lives with spouse at [facility]    Psychosocial/Cultural:  Patient's most important priorities:  receiving best chance to recover without burdensome and non beneficial treatments     Patient's biggest concerns/fears:  suffering    Previous death/end of life care history:  Father in law  a few years ago and family was torn    Patient's goals/hopes:  To get better    Spiritual: did not address today      > 50% of 42 min visit spent in chart review, face to face discussion of goals of care,  symptom assessment, coordination of care and emotional support.    Papa Marley MD  Palliative Medicine  [facility]

## 2020-03-16 NOTE — PROGRESS NOTES
Therapy with Vancomycin complete and consult discontinued by provider.  Pharmacy will sign off, please re-consult as needed.    Thank you,  Maryanne Hurt, PharmD  PGY-1 Resident  [id number]

## 2020-03-16 NOTE — EICU
Rounding (Video Assessment):  Yes    Intervention Initiated From:  COR / EICU    Comments: Video assessment done at this time. Patient on comfort flow with RN at bedside. O2 sats 87%. Patient calm and cooperative at this time.

## 2020-03-16 NOTE — PLAN OF CARE
Ongoing agitation and delirium refractory to haldol and precedex. May be related to ongoing air hunger and hypoxia. Will try low dose push of IV morphine at this time. Do not feel that patient would tolerate NIV at this time given his mental status. Remains DNR DNI.     Anil Rodriguez MD  [facility] Pulmonary and Critical Care Fellow  Pager: [phone]  Cell: [phone]

## 2020-03-16 NOTE — PLAN OF CARE
CMICU DAILY GOALS       A: Awake    RASS: Goal -  0 > Alert and Calm  Actual - RASS (Torres Agitation-Sedation Scale): 2-->agitated   Restraint necessity: Clinical Justification: Removing medical devices  B: Breath   SBT: Not intubated   C: Coordinate A & B, analgesics/sedatives   Pain: managed    SAT: Not intubated  D: Delirium   CAM-ICU: Overall CAM-ICU: Negative  E: Early Mobility   MOVE Screen: Fail   Activity: Activity Management: activity adjusted per tolerance  FAS: Feeding/Nutrition   Diet order: Diet/Nutrition Received: 2 gram sodium, no added salt, low saturated fat/low cholesterol, Specialty Diet/Nutrition Received: liquid level-honey(honey until cleared by Speech ) Fluid restriction:    T: Thrombus   DVT prophylaxis: VTE Required Core Measure: Pharmacological prophylaxis initiated/maintained  H: HOB Elevation   Head of Bed (HOB): HOB at 30 degrees  U: Ulcer Prophylaxis   GI: yes  G: Glucose control   managed    S: Skin   Bundle compliance: yes   Bathing/Skin Care: incontinence care, back care Date: [unfilled]3/15/2020    B: Bowel Function   diarrhea   I: Indwelling Catheters   Acuna necessity:      Urethral Catheter 03/14/20 1700-Reason for Continuing Urinary Catheterization: Critically ill in ICU requiring intensive monitoring   CVC necessity: No   IPAD offered: Not appropriate  D: De-escalation Antibx   Yes  Plan for the day   Continue Precedex drip to maintain RAAS 0; Restraints monitoring  Family/Goals of care/Code Status   Code Status: DNR     Pt was agitated within the shift, tries to get out of bed and pulled out peripheral IVs; Applied restraints to pt and Started precedex drip within the shift; Morphine IV given PRN;  VS and assessment per flow sheet, patient progressing towards goals as tolerated, plan of care reviewed with Taran Crowell and family, all concerns addressed, will continue to monitor.

## 2020-03-17 NOTE — DISCHARGE SUMMARY
[facility]  Critical Care Medicine  Discharge Summary      Patient Name: Taran Crowell  MRN: [id number]  Admission Date: 3/11/2020  Hospital Length of Stay: 6 days  Discharge Date and Time:  03/17/2020 4:44 AM  Attending Physician: Shelby Proctor MD   Discharging Provider: Kiran Sanchez MD  Primary Care Provider: Kemi Lopez MD  Reason for Admission: Respiratory Failure     HPI:   Mr. Taran Crowell is a 80 y.o. 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 Patient resides at [facility] which has confirmed COVID -19 case. Patient has recent travel history which about 3 weeks ago patient went on a [place] cruise around the [facility]. Patient's wife is also ill with mild cold like symptoms.      History mostly collected from the medical chart. Patient reports onset of symptoms approximately 2 weeks ago he began to develop weakness, severe progressive fatigue, a cough productive of clear sputum, decreased appetite, and overall malaise.  Over the last 2 days, he has developed intermittent fevers and chills, with home temperature reportedly 103 F. He became so weak that he can barely walk, he denies residual deficits from his stroke but did say his initial symptoms were ataxia. Endorses nausea and diarrhea (<4 BM/day) for the last 2 weeks, as well as decreased appetite. Patient denied rhinorrhea, sore throat, eye discharge, ear ache and post-nasal drip. Patient had an ED visit on 03/09 where he was positive for human rhinovirus.  He was prescribed Augmentin and reports taking this as prescribed.      In the emergency department, he was initially hypoxemic on ambient air, temp 99.5, and mildly tachypneic with respiratory rate of 20.  His hypoxemia improved with 2 L of supplemental oxygen.  He  was given 1 L of saline and ceftriaxone, azithromycin. Patient was admitted to hospital medicine team and COVID-19 testing confirmed positive. On 3/13 evening patient ambulated to go to bathroom and had a minor fall which increased his oxygen requirement to 10LNC and saturation dip below 90%. Patient also has fever of 102 and transfer to MICU.     * No surgery found *    Indwelling Lines/Drains at Time of Discharge:   Lines/Drains/Airways     Drain                 Urethral Catheter 03/14/20 1700 2 days              Hospital Course:   [facility]. Crowell was  [facility] for management of acute hypoxemic respiratory failure and febrile illness in the setting of rhino virus positive RIP  and COVID-19 positive. Patient started on Azithromycin and Ceftriaxone for lower respiratory infection. Evening of 3/13/20, patient had a fall ambulating alone to the bathroom and fell. Patient oxygen requirement elevated to 10L so patient was transfer to ICU for closer monitoring. Patient and family choose to become DNR/DNI. 3/17 4:20AM found patient bradycardic to 30's. Patient on morphine drip, not agitated and appears comfortable. Patient's wife at bedside. Asystole at 4:30AM    Consults (From admission, onward)        Status Ordering Provider     Inpatient consult to Palliative Care  Once     Provider:  Papa Garcia MD    Completed PAPA GARICA        Significant Labs:  ABGs: No results for input(s): PH, PCO2, HCO3, POCSATURATED, BE in the last 48 hours.  CMP:   Recent Labs   Lab 03/15/20  0512 03/16/20  0340    139   K 3.1* 3.7    103   CO2 23 23   * 170*   BUN 15 28*   CREATININE 0.8 1.0   CALCIUM 9.2 8.7   PROT 6.2 6.5   ALBUMIN 2.4* 2.5*   BILITOT 0.8 0.7   ALKPHOS 27* 36*   * 254*   ALT 96* 200*   ANIONGAP 14 13   EGFRNONAA >60.0 >60.0       Significant Imaging:  I have reviewed all pertinent imaging results/findings within the past 24 hours.    Pending Diagnostic Studies:      Procedure Component Value Units Date/Time    EKG 12-lead [[id number]]     Order Status:  Sent Lab Status:  No result     Legionella culture Sputum, Expectorated [[id number]] Collected:  20    Order Status:  Sent Lab Status:  In process Updated:  20    Specimen:  Sputum Expectorated         Final Active Diagnoses:    Diagnosis Date Noted POA    PRINCIPAL PROBLEM:  Acute hypoxemic respiratory failure [J96.01] 2020 Yes    Acute encephalopathy [G93.40] 2020 Clinically Undetermined    Palliative care encounter [Z51.5]  Not Applicable    ACP (advance care planning) [Z71.89] 03/15/2020 Not Applicable    Palliative care by specialist [Z51.5] 2020 Not Applicable    Hypothyroidism [E03.9] 2020 Unknown    Rhinovirus [B34.8] 2020 Yes    Complex sleep apnea syndrome [G47.31]  Yes    History of stroke [Z86.73] 2017 Not Applicable    Chronic anticoagulation - on apixaban [Z79.01] 2017 Not Applicable     Chronic    Essential tremor [G25.0] 2017 Yes     Chronic    Mixed hyperlipidemia [E78.2] 2017 Yes     Chronic    BPH (benign prostatic hyperplasia) [N40.0] 2017 Unknown    PAF (paroxysmal atrial fibrillation) [I48.0] 2015 Yes    Gastroesophageal reflux disease without esophagitis [K21.9]  Yes     Chronic      Problems Resolved During this Admission:     No new Assessment & Plan notes have been filed under this hospital service since the last note was generated.  Service: Critical Care Medicine    Discharged Condition:     Disposition:       Patient Instructions:   No discharge procedures on file.  Medications:  None (patient  at medical facility)     VERONICA Sanchez MD  Critical Care Medicine  [facility]

## 2020-03-17 NOTE — ASSESSMENT & PLAN NOTE
Palliative Care following. DNR/DNI. Multiple conversations with patients family today.  Decision made to transition to comfort measures only this evening.

## 2020-03-17 NOTE — CARE UPDATE
Death Note    Called to bedside by patient's nurse. Nursing supervisor notified. Family at bedside.  has been called and is also at bedside.    Patient is not responding to verbal or tactile stimuli. Patient does not have a papillary or corneal reflex. His pupils are fixed and dilated. No heart or breath sounds on auscultation. No respirations. No palpable pulses.      Time of death:  03/17/2020 4:30AM     Cause of Death:  Respiratory Failure from COVID-19     Kiran Sanchez MD  Internal Medicine, PGY-2  Leers: [email or web address]

## 2020-03-17 NOTE — PROGRESS NOTES
[facility]  Critical Care Medicine  Progress Note    Patient Name: Taran Crowell  MRN: [id number]  Admission Date: 3/11/2020  Hospital Length of Stay: 5 days  Code Status: DNR  Attending Provider: Shelby Proctor MD  Primary Care Provider: Kemi Lopez MD   Principal Problem: Acute hypoxemic respiratory failure    Subjective:     HPI:  Mr. Taran Crowell is a 80 y.o. 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 Patient resides at [facility] which has confirmed COVID -19 case. Patient has recent travel history which about 3 weeks ago patient went on a [place] cruise around the [facility]. Patient's wife is also ill with mild cold like symptoms.      History mostly collected from the medical chart. Patient reports onset of symptoms approximately 2 weeks ago he began to develop weakness, severe progressive fatigue, a cough productive of clear sputum, decreased appetite, and overall malaise.  Over the last 2 days, he has developed intermittent fevers and chills, with home temperature reportedly 103 F. He became so weak that he can barely walk, he denies residual deficits from his stroke but did say his initial symptoms were ataxia. Endorses nausea and diarrhea (<4 BM/day) for the last 2 weeks, as well as decreased appetite. Patient denied rhinorrhea, sore throat, eye discharge, ear ache and post-nasal drip. Patient had an ED visit on 03/09 where he was positive for human rhinovirus.  He was prescribed Augmentin and reports taking this as prescribed.      In the emergency department, he was initially hypoxemic on ambient air, temp 99.5, and mildly tachypneic with respiratory rate of 20.  His hypoxemia improved with 2 L of supplemental oxygen.  He was given 1 L of saline and ceftriaxone,  azithromycin. Patient was admitted to hospital medicine team and COVID-19 testing confirmed positive. On 3/13 evening patient ambulated to go to bathroom and had a minor fall which increased his oxygen requirement to 10LNC and saturation dip below 90%. Patient also has fever of 102 and transfer to MICU.     Hospital/ICU Course:  [facility] and febrile illness in the setting of rhino virus positive RIP  and COVID-19 positive. Patient started on Azithromycin and Ceftriaxone for lower respiratory infection. Evening of 3/13/20, patient had a fall ambulating alone to the bathroom and fell. Patient oxygen requirement elevated to 10L so patient was transfer to ICU for closer monitoring. Antibiotics were broadened to pip/tazo, vancomycin, and azithromycin.  Oxygen requirements continue to increase.  Currently on comfort flow. Palliative care following for advance care planning.     Interval History/Significant Events: Agitated overnight.  Did not respond well with Haldol or precedex infusion.  Responded to morphine IVP.   Appears comfortable.  No complaints of dyspnea.     Review of Systems   Unable to perform ROS: Mental status change     Objective:     Vital Signs (Most Recent):  Temp: 97.5 °F (36.4 °C) (03/16/20 1500)  Pulse: 64 (03/16/20 1500)  Resp: (!) 42 (03/16/20 1500)  BP: 133/71 (03/16/20 1500)  SpO2: (!) 91 % (03/16/20 1500) Vital Signs (24h Range):  Temp:  [97 °F (36.1 °C)-98.7 °F (37.1 °C)] 97.5 °F (36.4 °C)  Pulse:  [38-71] 64  Resp:  [] 42  SpO2:  [78 %-97 %] 91 %  BP: ()/() 133/71   Weight: 89 kg (196 lb 3.4 oz)  Body mass index is 28.98 kg/m².      Intake/Output Summary (Last 24 hours) at 3/16/2020 1548  Last data filed at 3/16/2020 1500  Gross per 24 hour   Intake 1561.85 ml   Output 785 ml   Net 776.85 ml       Physical Exam   Constitutional: He appears well-developed. No distress.   HENT:   Head:  Normocephalic.   Eyes:   Round and equal.  Eyes midline, no eye deviation or roving eye movements   Cardiovascular: Bradycardia present.   No murmur heard.  Warm extremities, no peripheral edema   Pulmonary/Chest: No accessory muscle usage. Tachypnea noted. He has no wheezes. He has rales.   Comfort flow   Abdominal: Soft. Bowel sounds are normal. There is no tenderness.   Genitourinary:   Genitourinary Comments: Urine catheter   Neurological: GCS eye subscore is 4. GCS verbal subscore is 4. GCS motor subscore is 6.   Awake, alert, disoriented to place and time.  Confused in conversation. Following request and moving all extremities equally and symmetrically.    Skin: Skin is warm and dry. He is not diaphoretic.   Nursing note and vitals reviewed.      Vents:  Oxygen Concentration (%): 100 (03/16/20 1500)  Lines/Drains/Airways     Drain                 Urethral Catheter 03/14/20 1700 1 day          Peripheral Intravenous Line                 Peripheral IV - Single Lumen 03/13/20 2018 20 G Right Antecubital 2 days         Peripheral IV - Double Lumen 03/16/20 0200 20 G Anterior;Left Hand less than 1 day              Significant Labs:    CBC/Anemia Profile:  Recent Labs   Lab 03/15/20  0512 03/16/20  0340   WBC 9.49 15.41*   HGB 12.5* 13.0*   HCT 38.0* 39.9*    241   MCV 97 97   RDW 14.2 13.9        Chemistries:  Recent Labs   Lab 03/15/20  0512 03/16/20  0340    139   K 3.1* 3.7    103   CO2 23 23   BUN 15 28*   CREATININE 0.8 1.0   CALCIUM 9.2 8.7   ALBUMIN 2.4* 2.5*   PROT 6.2 6.5   BILITOT 0.8 0.7   ALKPHOS 27* 36*   ALT 96* 200*   * 254*   MG 1.8 2.1   PHOS 3.3 4.1       All pertinent labs within the past 24 hours have been reviewed.    Significant Imaging:  I have reviewed and interpreted all pertinent imaging results/findings within the past 24 hours.      ABG  Recent Labs   Lab 03/13/20  1946   PH 7.498*   PO2 56*   PCO2 23.0*   HCO3 17.8*   BE -5     Assessment/Plan:     Neuro  Acute  encephalopathy  Disoriented and confused.  Suspect delirium.  Non-focal on exam.     --Delirium precautions.       Pulmonary  * Acute hypoxemic respiratory failure  Rhino virus positive and COVID -19 virus confirmed positive admitted to ICU service for acute hypoxemic respiratory failure requiring increase oxygen requirement. Patient is a resident of [facility] where there were positive cases of COVID-19 virus. Fever of 102F with increasing oxygen requirements. Initial CXR showed RUL and LLL atelectasis seen. Most likely cause is viral bronchitis from COVID-19 and Rhinovirus versus bacterial PNA. Influenza negative, blood culture NGTD.  Respiratory infectious panel 3/9 with rhinovirus/enterovirus.  Sputum culture with normal heidi.  Oxygen requirements continue to increase throughout the day.  Now on 40 L 100% FiO2.     --Continue empiric antibiotics vancomycin and pip/tazo. Completed 5 day course of azithromycin.   --Started on hydroxychloroquine 3/15/2020.   --Wean comfort flow as tolerated for SpO2 > 88%  --Airborne and droplet contact isolation   --Redose lasix.   --Morphine PRN for dyspnea     Person with COVID-19  Presumptive case at his nursing home.  Recent international travel within 2 weeks of onset of symptoms, not to a high risk country  - COVID-19 testing positive  - PUI #[id number]            PAF (paroxysmal atrial fibrillation)  Patient with history of A-Fib on home Eliquis, Flecainide and Metoprolol. Current in NSR     --Continue apixaban 5 mg BID  --Continue flecainide 50 mg BID  --Hold  metoprolol tartrate 12.5 mg BID given bradycardia. Suspect bradycardia from precedex which was started for agitation.       ID  Rhinovirus  - see acute respiratory failure         Palliative Care  ACP (advance care planning)  Palliative Care following. DNR/DNI. Multiple conversations between CCM and patient's family today.  Decision made to transition to comfort measures only this evening.        Discussed plan  with Dr. Hitesh Reynolds updated per [facility] team.     Critical Care Time: 35 minutes  Critical secondary to Patient has a condition that poses threat to life and bodily function: Acute Hypoxemic Respiratory Failure.      Critical care was time spent personally by me on the following activities: development of treatment plan with patient or surrogate and bedside caregivers, discussions with consultants, evaluation of patient's response to treatment, examination of patient, ordering and performing treatments and interventions, ordering and review of laboratory studies, ordering and review of radiographic studies, pulse oximetry, re-evaluation of patient's condition. This critical care time did not overlap with that of any other provider or involve time for any procedures.     Shannan Garza NP  Critical Care Medicine  [facility]

## 2020-03-17 NOTE — ASSESSMENT & PLAN NOTE
Rhino virus positive and COVID -19 virus confirmed positive admitted to ICU service for acute hypoxemic respiratory failure requiring increase oxygen requirement. Patient is a resident of [facility] where there were positive cases of COVID-19 virus. Fever of 102F with increasing oxygen requirements. Initial CXR showed RUL and LLL atelectasis seen. Most likely cause is viral bronchitis from COVID-19 and Rhinovirus versus bacterial PNA. Influenza negative, blood culture NGTD.  Respiratory infectious panel 3/9 with rhinovirus/enterovirus.  Sputum culture with normal heidi.  Oxygen requirements continue to increase throughout the day.  Now on 40 L 100% FiO2.     --Continue empiric antibiotics vancomycin and pip/tazo. Completed 5 day course of azithromycin.   --Started on hydroxychloroquine 3/15/2020.   --Wean comfort flow as tolerated for SpO2 > 88%  --Airborne and droplet contact isolation   --Redose lasix.   --Morphine PRN for dyspnea     Person with COVID-19  Presumptive case at his nursing home.  Recent international travel within 2 weeks of onset of symptoms, not to a high risk country  - COVID-19 testing positive  - PUI #[id number]

## 2020-03-17 NOTE — CHAPLAIN
Spiritual Care Encounter  Please contact the Department of Spiritual Care, your Unit  or the On-call  at any time if any needs are brought to your attention. i.e. Anytime you note spiritual, existential or emotional distress or there is a new diagnosis and/or prognosis.      Purpose:  To provide emotional, spiritual and/or existential support to the patient and/or their loved one's during their time of grief/loss.  To also provide information to the family/staff/supports regarding the decedent care process along with the paperwork required for decedent documentation.   Visit Type: Initial Visit  Visit Category: Critical Care, Death  Visited With: Family, Health care provider  Number of Family Visited: 3(Wife, Dgt (in Person), Dgt (Via Skype))  Length of Visit: 73 Minutes  Continue Visiting: No  Referral From: Nurse  Referral To:      Assessment/Intervention:  I attended to the bedside of the patient where is wife, and two daughters were present (one via video chat). The family was in an appropriate state of shock and quite tearful. The patient's wife needed continued reassurance that the patient had  and would not be waking up 'alone'. The patient's wife was given time alone with her  to say goodbye. The patients wife, upon exiting the room, once again needed reassurance that the patient was indeed gone and would not be waking up. Reassurances and supports were provided as well as questions answered regarding next steps.           Patient Spiritual Encounters  Care Provided: Decedent Care  Patient Coping: Other (Comment)(Patient dead upon my arrival)   Family Spiritual Encounters  Care Provided: Reflective listening, Compassionate presence, Grief care, Explored pt/family concerns, Assessed philip resources  Family Coping: Denial, Fearful, Non-verbal, Sadness, Living with uncertainty, Internal strength(Shock)  Comments - Family: Wife very anxious and continues to worry that patient  will wake up alone in a dark place an needs reasurrance that the patient is dead   Stated Catholic: Mormonism (Artemio)  Catholic Given By:Previous  Encounter (Other)  Importance of Teena Values to Patient: Unable to Assess  Importance of Specific Advent Values to Patient: Unable to Assess   EPIC Charted Catholic: Mormonism          OF NOTE:  Spiritual, Cultural Beliefs, Advent Practices, Values that Affect Care: yes                   Plan of Care/Goals:  Sadly, the patient has passed away and will therefore require no follow-up. There will be continued thoughts of peace and blessing for the patient's loved one's during this time of loss and grief. Please contact the department of spiritual care with any questions you may have regarding the decedent care process and/or paperwork.         Nichol Hernandes    Office: [phone]  On-Call 24/7: [phone]  Department of Spiritual Care - [facility]

## 2020-03-17 NOTE — PLAN OF CARE
Discussion with family had about patient condition. They understand that patient is on maximal oxygen supplementation without intubation and have chosen to proceed with comfort care. They agree for him to receive morphine drip for dyspnea and are okay with patient having comfort flow replaced with non rebreather to reduce aerosolization of COVID. Discussed replacing comfort flow with mask as long as patient is not agitated with it. Will allow morphine pushes, ativan for anxiety.     Cedrick Mota MD  [facility]/Critical Care PGY IV

## 2020-03-17 NOTE — PLAN OF CARE
Patient   2020 4:44 AM.         20 1443   Final Note   Assessment Type Final Discharge Note   Anticipated Discharge Disposition    Hospital Follow Up  Appt(s) scheduled? No   Discharge plans and expectations educations in teach back method with documentation complete? No   Right Care Referral Info   Post Acute Recommendation No Care   Referral Type        Jagdeep Hogan RN MSN  Critical Care-   Ext. [phone]

## 2020-03-18 NOTE — PHYSICIAN QUERY
PT Name: Taran Crowell  MR #: [id number]     Physician Query Form - Documentation Clarification      CDS/: Hannah Valladares   RN CCDS            Contact information:[email or web address]    This form is a permanent document in the medical record.     Query Date: March 18, 2020    By submitting this query, we are merely seeking further clarification of documentation. Please utilize your independent clinical judgment when addressing the question(s) below.    The Medical record reflects the following:    Supporting Clinical Findings Location in Medical Record       80 male highly functional male with h/o CVA admitted with hypoxemic respiratory failure due to COVID-19 and multilobar PNA         Palliative care PN 3/15-3/16     Pulmonary   * Acute hypoxemic respiratory failure   Rhino virus positive and COVID -19 virus confirmed positive admitted to ICU service for acute hypoxemic respiratory failure requiring increase oxygen requirement. Patient is a resident of [facility] where there were positive cases of COVID-19 virus. Fever of 102F with increasing oxygen requirements.  Initial CXR showed RUL and LLL atelectasis seen. Most likely cause is viral bronchitis from COVID-19 and Rhinovirus versus bacterial PNA.    Rocephin IV  Azithromycin IV   Zosyn IV  Vancomycin IV  Cefepime IV        Critical care note 3/16                              MAR                                                                            Please clarify the pneumonia/bronchitis.  Thank you.    Provider Use Only      (please check all that apply)    [    ] Viral bronchitis    [  X  ] Viral pneumonia     [    ] Bacterial pneumonia     [    ] Other_________________________                                                                                                               [  ] Clinically Undetermined

## 2020-03-18 NOTE — PHYSICIAN QUERY
PT Name: Taran Crowell  MR #: [id number]     CDS/: Hannah Valladares RN CCDS            Contact information:[email or web address]  This form is a permanent document in the medical record.     Query Date: March 18, 2020    Physician Query - Neurological Condition Clarification    By submitting this query, we are merely seeking further clarification of documentation to reflect the severity of illness of your patient. Please utilize your independent clinical judgment when addressing the question(s) below.    The Medical record reflects the following:     Indicators   Supporting Clinical Findings Location in Medical Record   x AMS, Confusion,  LOC, etc.  Neuro   Acute encephalopathy   Disoriented and confused. Suspect delirium. Non-focal on exam.     --Delirium precautions.       x Acute / Chronic Illness Mr. Taran Crowell is a 80 y.o. 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, GERD, hypothyroidism, LUNA on BiPAP, presented with fever and cough in the setting of positive corona virus/COVID-19      Radiology Findings      Electrolyte Imbalance     x Medication Pt with increasing agitation and restlessness overnight; unresponsive to haldol or precedex with some bradycardia; responded best to IV morphine x 2    CC PN 3/13    Treatment             x Other   POC PH 7.498 (H)   POC PCO2 23.0 (LL)   POC PO2 56 (LL)   POC BE -5   POC HCO3 17.8 (L)   POC SATURATED O2 92 (L)   POC TCO2 19 (L)   Flow 7   Sample ARTERIAL   DelSys Nasal Can   Allens Test Pass   Site RR   Mode SPONT    ABG's on 7L/NC      Encephalopathy- is a general term for any diffuse disease of the brain that alters brain function or structure. Treatment of the cognitive dysfunction varies but is ultimately dependent on the treatment of the underlying condition.    Major Symptoms of Encephalopathy - Decreased level of consciousness, fluctuating alertness/concentration, confusion, agitation, lethargy,  SBAR report received, board updated. Pt awake, alert, and oriented x4. Pt assessed to have 
moderate pain 6-7/10 r/t Right hip surgical site, no SOB or acute distress otherwise. Pt 
compliant with all routine morning medication, refused Zinc reports no long part of her 
daily regiment, Dilaudid administered per Q4hr PRN pain orders. Pt sitting comfortably in 
semi-fowlers to eat breakfast. Pt expresses eagerness to participate in all scheduled 
therapies for today. All comfort and safety measures met at this time. Personal items and 
call light within reach. Will continue to monitor. somnolence, drowsiness, obtundation, stupor, or coma.         References: National Institutes of Healths (NIH) National [place] of Neurological Disorders and Strokes;  HCPro 2016; Advisory Board     Clinical Guidelines:   These guidelines will set system standards to assist providers in managing, documentation, and coding of encephalopathy. The intent of this document is to serve as a system guideline, not replace the providers clinical judgment:    Please further clarify the type of encephalopathy.  Thank you.  [   ] Anoxic/Hypoxic Encephalopathy- Brain damage due to anoxia/hypoxia   [ x  ] Metabolic Encephalopathy - Due to electrolye imbalance, metabolic derangements, or infections processes, includes Septic Encephalopathy   [   ] Other Encephalopathy - Includes uremic encephalopathy   [   ] Unspecified Encephalopathy      [   ] Other Neurological Condition-  Includes Post-ictal altered mental status. (please specify condition): _________   [   ] Encephalopathy ruled out   [   ]  Clinically Undetermined     Please document in your progress notes daily for the duration of treatment until resolved, and include in your discharge summary.

## 2020-03-19 LAB — BACTERIA BLD CULT: NORMAL

## 2022-08-22 NOTE — PROGRESS NOTES
2 pt identifiers. Pt received vaccine. Pt requested to remain 15 minutes.  
[facility]

## 2022-10-10 NOTE — ASSESSMENT & PLAN NOTE
80 male highly functional male with h/o CVA admitted with hypoxemic respiratory failure due to COVID-19 and multilobar PNA    1) Symptoms: dyspnea: ongoing supportive care; refractory symptoms consider low dose morphine 2 mg q 1 hour prn; fan in room; cooler air temps    2) Code status: based on today's discussion with pt and family; DNR/DNI placed on chart based on pt's values and treatment recommendations    3) Psychosocial: highly functional, still teaching law; lived and functioned independently    4) Medicolegal: has AD/LW (not on chart); wife is HCPOA    5) Spiritual:    6) Goals of care/discussion:    7) Plan:   Plan:   Change code status to DNR in accordance to patient's values and previous declared end of life preferences.      Cont best supportive care available     Cont open communication with family based on patient status     In the event of clinical deterioration, the family and patient would want to avoid inappropriate prolongation of the dying process and aggressive symptom management.      Will remain available. Please call with questions.     Papa Marley MD  Palliative Medicine  [phone]     General